# Patient Record
Sex: MALE | Race: WHITE | Employment: OTHER | URBAN - METROPOLITAN AREA
[De-identification: names, ages, dates, MRNs, and addresses within clinical notes are randomized per-mention and may not be internally consistent; named-entity substitution may affect disease eponyms.]

---

## 2017-01-17 RX ORDER — METOPROLOL SUCCINATE 50 MG/1
TABLET, EXTENDED RELEASE ORAL
Qty: 90 TAB | Refills: 2 | Status: SHIPPED | OUTPATIENT
Start: 2017-01-17 | End: 2017-10-10 | Stop reason: SDUPTHER

## 2017-01-18 ENCOUNTER — OFFICE VISIT (OUTPATIENT)
Dept: CARDIOLOGY CLINIC | Age: 67
End: 2017-01-18

## 2017-01-18 VITALS
SYSTOLIC BLOOD PRESSURE: 120 MMHG | DIASTOLIC BLOOD PRESSURE: 70 MMHG | BODY MASS INDEX: 32.78 KG/M2 | OXYGEN SATURATION: 97 % | HEART RATE: 60 BPM | WEIGHT: 229 LBS | HEIGHT: 70 IN

## 2017-01-18 DIAGNOSIS — Z98.890 S/P ABLATION OF ATRIAL FIBRILLATION: Primary | ICD-10-CM

## 2017-01-18 DIAGNOSIS — Z86.79 S/P ABLATION OF ATRIAL FIBRILLATION: Primary | ICD-10-CM

## 2017-01-18 DIAGNOSIS — I10 ESSENTIAL HYPERTENSION: ICD-10-CM

## 2017-01-18 DIAGNOSIS — E78.00 HYPERCHOLESTEREMIA: ICD-10-CM

## 2017-01-18 RX ORDER — AMIODARONE HYDROCHLORIDE 200 MG/1
200 TABLET ORAL DAILY
COMMUNITY
End: 2017-08-02

## 2017-01-18 NOTE — MR AVS SNAPSHOT
Visit Information Date & Time Provider Department Dept. Phone Encounter #  
 1/18/2017  9:20 AM Alessandra Garay DO [facility] [id number] Follow-up Instructions Return in about 6 months (around 7/18/2017), or if symptoms worsen or fail to improve. Upcoming Health Maintenance Date Due Hepatitis C Screening 1950 DTaP/Tdap/Td series (1 - Tdap) 10/27/1970 ZOSTER VACCINE AGE 60> 10/27/2009 GLAUCOMA SCREENING Q2Y 10/27/2014 Pneumococcal 65+ Low/Medium Risk (1 of 2 - PCV13) 10/27/2014 MEDICARE YEARLY EXAM 10/27/2014 INFLUENZA AGE 9 TO ADULT 8/1/2016 COLONOSCOPY 7/1/2019 Allergies as of 1/18/2017  Review Complete On: 1/18/2017 By: Alessandra Garay DO Severity Noted Reaction Type Reactions Lipitor [Atorvastatin]  02/05/2016    Myalgia Current Immunizations  Never Reviewed No immunizations on file. Not reviewed this visit You Were Diagnosed With   
  
 Codes Comments S/P ablation of atrial fibrillation    -  Primary ICD-10-CM: Z98.890, Z86.79 
ICD-9-CM: V45.89 Essential hypertension     ICD-10-CM: I10 
ICD-9-CM: 401.9 Hypercholesteremia     ICD-10-CM: E78.00 ICD-9-CM: 272.0 Vitals BP Pulse Height(growth percentile) Weight(growth percentile) SpO2 BMI  
 120/70 60 5' 10\" (1.778 m) 229 lb (103.9 kg) 97% 32.86 kg/m2 Smoking Status Never Smoker Vitals History BMI and BSA Data Body Mass Index Body Surface Area  
 32.86 kg/m 2 2.27 m 2 Preferred Pharmacy Pharmacy Name Phone [facility] [address], [address] [phone] Your Updated Medication List  
  
   
This list is accurate as of: 1/18/17 10:09 AM.  Always use your most recent med list.  
  
  
  
  
 amiodarone 200 mg tablet Commonly known as:  CORDARONE Take 200 mg by mouth daily. apixaban 5 mg tablet Commonly known as:  ELIQUIS  
TAKE ONE TABLET BY MOUTH TWICE A DAY  
  
 CRESTOR 20 mg tablet Generic drug:  rosuvastatin TAKE ONE TABLET BY MOUTH EVERY NIGHT AT BEDTIME  
  
 digoxin 0.25 mg tablet Commonly known as:  LANOXIN Take 1 Tab by mouth daily. metoprolol succinate 50 mg XL tablet Commonly known as:  TOPROL-XL  
TAKE ONE TABLET BY MOUTH DAILY We Performed the Following AMB POC EKG ROUTINE W/ 12 LEADS, INTER & REP [72620 CPT(R)] Follow-up Instructions Return in about 6 months (around 7/18/2017), or if symptoms worsen or fail to improve. To-Do List   
 01/18/2017 Lab:  DIGOXIN   
  
 03/15/2017 Lab:  HEPATIC FUNCTION PANEL   
  
 03/15/2017 Lab:  LIPID PANEL Introducing [facility]! Ana Benavidez introduces [employer] patient portal. Now you can access parts of your medical record, email your doctor's office, and request medication refills online. 1. In your internet browser, go to [email or web address] 2. Click on the First Time User? Click Here link in the Sign In box. You will see the New Member Sign Up page. 3. Enter your [employer] Access Code exactly as it appears below. You will not need to use this code after youve completed the sign-up process. If you do not sign up before the expiration date, you must request a new code. · Mark[employer] Access Code: [email or web address] Expires: 3/29/2017  5:31 PM 
 
4. Enter the last four digits of your Social Security Number (xxxx) and Date of Birth (mm/dd/yyyy) as indicated and click Submit. You will be taken to the next sign-up page. 5. Create a HubChillat ID. This will be your Markit login ID and cannot be changed, so think of one that is secure and easy to remember. 6. Create a Markit password. You can change your password at any time. 7. Enter your Password Reset Question and Answer. This can be used at a later time if you forget your password. 8. Enter your e-mail address. You will receive e-mail notification when new information is available in [address] 9. Click Sign Up. You can now view and download portions of your medical record. 10. Click the Download Summary menu link to download a portable copy of your medical information. If you have questions, please visit the Frequently Asked Questions section of the [employer] website. Remember, [employer] is NOT to be used for urgent needs. For medical emergencies, dial 911. Now available from your iPhone and Android! Please provide this summary of care documentation to your next provider. Your primary care clinician is listed as [phone]. If you have any questions after today's visit, please call [phone].

## 2017-01-18 NOTE — PROGRESS NOTES
HPI: I saw Kamini Chavezantonio Whitman in my office today in cardiovascular evaluation regarding his paroxysmal atrial fibrillation. Mr. Gallo Whitman is a pleasant 78year old white male with history of hypertension, hypercholesterolemia and a family history of atrial fibrillation with an older brother having atrial fibrillation and apparently an atrial fibrillation ablation in the past as well. He also gives history of his mother having atrial fibrillation.      He himself did not have any significant problems until 12/19/15, when he had some palpitations and was found to be in atrial flutter with 2:1 block and heart rate of 135-140. He was given IV Lopressor and ultimately converted back to sinus rhythm. I saw him on 12/22/15 and he had been on Toprol and Lisinopril and was having some dizziness, so Lisinopril was discontinued and I just kept him on Toprol, and he seemed to be doing very well when I saw him in the office on 2/5/16 in sinus rhythm with a heart rate of 66 at that time. We did do some testing on him back in January of 2016 with a nuclear myocardial perfusion study, which was completely normal, and also an echocardiogram in January of 2016, which demonstrated normal left ventricular size and function with ejection fraction of 50%, although there was moderate left ventricular hypertrophy and grade 2 diastolic dysfunction. There was normal left atrial size, although the right atrium was mildly enlarged. It should also be noted that he had a chest xray, which demonstrated a possible lung nodule and a CT scan was done on 1/5/16, which showed a 4 mm solid nodule in the right lower lobe, for which another CT is being planned to be completed in January of 2017 in follow up.     He came in to see me back on September 13, 2016 with complaints of fatigue over the prior month or 2 with finding of atrial fibrillation with rapid ventricular response at that time with a rate of about 160.  I sent him to the emergency room and we began digitalization. He subsequently was referred to Dr. Karen Kyle and ultimately admitted to [facility] where he had atrial fibrillation pulmonary vein isolation ablation using [employer]. A Trans-esophageal echocardiogram was done prior to ablation without left atrial appendage thrombus and this was all completed on November 8, 2016. He was started on amiodarone 200 mg twice a day for a week and then 200 mg daily was planned for the next 3 months which would be ending in early February 2017. He comes in today and relates that he is doing very well. He is not having any palpitations or any other cardiovascular complaints on his current medications. Encounter Diagnoses   Name Primary?  S/P ablation of atrial fibrillation Yes    Essential hypertension     Hypercholesteremia        Discussion: This gentleman appears to be doing about as well as we could expect and really have no recommendations for change at this time. He is remaining in sinus rhythm and is not complaining of any palpitations. We will plan to continue him on amiodarone through mid February and in view of the fact that he is also on digoxin I am going to get a digoxin dosage now to be sure he is not showing any signs of toxicity in view of the fact that amiodarone does tend to increase digoxin levels. We did discuss whether or not to continue on Eliquis long-term and he is having some issues with regard to cost in that regard so he is going to check with his insurance company and then let us know if we need to switch him to Xarelto or another agent for cost purposes since his co-pay on Eliquis apparently went up this month. We discussed whether to stay on anticoagulation empirically or not and he right now thinks he would rather stay on anticoagulation for 6 months and then get a Holter monitor study before we discontinue the anticoagulation which I think is a reasonable option.     His latest lipid profile which was completed in September 2016 demonstrated total cholesterol of 193 with triglycerides of 266, HDL 42, LDL of 97.8, and VLDL of 53.2 which is really suboptimal control on Crestor 20 mg daily and I am going to repeat his lipid profile in March 2017 and will make further recommendations at that time. His blood pressure appears to be well-controlled today EKG is stable in sinus rhythm so I will plan to see him again in several months or as needed if any new cardiovascular symptoms surface in the interim. PCP: Fay Ibrahim MD       Past Medical History   Diagnosis Date    Cardiac echocardiogram 01/08/2016     EF 60%. No WMA. Mod LVH. Gr 2 DDfx. Mild WALE.  Cardiac Holter monitor 02/12/2016     Mildly abnormal 48-hr Holter. Normal sinus rhythm w/a few short bursts of atrial fibrillation from 20-40 beats up to 170 bpm.  No symptoms reported.  Cardiac nuclear imaging study, normal 01/08/2016     Low risk. No ischemia or prior infarction. No RWMA. EF 63%. Neg EKG on pharm stress test.    Cardiac transesophageal echocardiography ([facility]) 11/08/2016     EF 55%. No WMA. LVH. Mild LAE. No LA appendage thrombus. Mod WALE.  Hypercholesterolemia     Hypertension     Typical atrial flutter ([address] 12/19/2015     atrial flutter with 2 to1 block at a rate of 135 on EKG       Past Surgical History   Procedure Laterality Date    Hx hemorrhoidectomy      Hx cataract removal      Hx hemorrhoidectomy         Current Outpatient Rx   Name  Route  Sig  Dispense  Refill    metoprolol succinate (TOPROL XL) 50 mg XL tablet    Oral    Take 1 Tab by mouth daily for 30 days. 90 Tab    3      apixaban (ELIQUIS) 5 mg tablet    Oral    Take 1 Tab by mouth two (2) times a day. 60 Tab    6      pravastatin (PRAVACHOL) 80 mg tablet    Oral    Take 80 mg by mouth daily.                  Allergies   Allergen Reactions    Lipitor [Atorvastatin] Myalgia       Social History :  Social History   Substance Use Topics    Smoking status: Never Smoker    Smokeless tobacco: Never Used    Alcohol use Yes        Family History: family history includes Cancer in his mother; Colon Cancer in his mother; Colon Polyps in his brother and sister. Review of Systems:   Constitutional: Negative. Respiratory: Positive for cough and sputum production. Negative for hemoptysis, shortness of breath and wheezing. Cardiovascular: Negative. Gastrointestinal: Negative. Musculoskeletal: Positive for joint pain. Negative for back pain, falls, myalgias and neck pain. Physical Exam:    The patient is a cooperative, alert, well developed, well nourished 78 y.o.   male who is in no acute distress at the time of the examination. Visit Vitals    /70    Pulse 60    Ht 5' 10\" (1.778 m)    Wt 103.9 kg (229 lb)    SpO2 97%    BMI 32.86 kg/m2       HEENT: Conjuctiva white, mucosa moist, no pallor or cyanosis. NECK: Supple without masses, tenderness or thyromegaly. There was no jugular venous distention. Carotid are full bilaterally without bruits. CHEST: Symmetrical with good excursion. LUNGS: Clear to auscultation in all fields. HEART: Irregularly irregular rhythm with a rapid ventricular response. The apex is not displaced. There were no lifts, thrills or heaves. There is a normal S1 and variable S2 without appreciable murmurs, rubs, clicks, or gallops auscultated. ABDOMEN: Soft without masses, tenderness or organomegaly. EXTREMITIES: Full peripheral pulses with trace peripheral edema. Review of Data: See PMH and Cardiology and Imaging sections for cardiac testing      Results for orders placed or performed in visit on 01/18/17   AMB POC EKG ROUTINE W/ 12 LEADS, INTER & REP     Status: None    Narrative    Normal sinus rhythm, rate 60. There is some sinus arrhythmia. Early R-wave transition anteriorly. This EKG is similar to the EKG of December 5, 2016.          Armando Abreu Mayur Sandhu.[email or web address]. [facility]  [facility]  [address]. [address] [address]    PLEASE NOTE:  This document has been produced using voice recognition software. Unrecognized errors in transcription may be present.

## 2017-01-18 NOTE — PROGRESS NOTES
Review of Systems   Constitutional: Negative. Respiratory: Positive for cough and sputum production. Negative for hemoptysis, shortness of breath and wheezing. Cardiovascular: Negative. Gastrointestinal: Negative. Musculoskeletal: Positive for joint pain. Negative for back pain, falls, myalgias and neck pain.

## 2017-01-18 NOTE — PROGRESS NOTES
1. Have you been to the ER, urgent care clinic since your last visit? Hospitalized since your last visit? Yes, 11/8/16 - 11/9/16 for afib ablation     2. Have you seen or consulted any other health care providers outside of the [address] [place] [address] since your last visit? Include any pap smears or colon screening.   No

## 2017-01-20 ENCOUNTER — HOSPITAL ENCOUNTER (OUTPATIENT)
Dept: LAB | Age: 67
Discharge: HOME | End: 2017-01-20
Payer: MEDICARE

## 2017-01-20 DIAGNOSIS — Z98.890 S/P ABLATION OF ATRIAL FIBRILLATION: ICD-10-CM

## 2017-01-20 DIAGNOSIS — Z86.79 S/P ABLATION OF ATRIAL FIBRILLATION: ICD-10-CM

## 2017-01-20 LAB — DIGOXIN SERPL-MCNC: 1.1 NG/ML (ref 0.9–2)

## 2017-01-20 PROCEDURE — 36415 COLL VENOUS BLD VENIPUNCTURE: CPT | Performed by: INTERNAL MEDICINE

## 2017-01-20 PROCEDURE — 80162 ASSAY OF DIGOXIN TOTAL: CPT | Performed by: INTERNAL MEDICINE

## 2017-01-24 ENCOUNTER — TELEPHONE (OUTPATIENT)
Dept: CARDIOLOGY CLINIC | Age: 67
End: 2017-01-24

## 2017-01-24 DIAGNOSIS — Z86.79 S/P ABLATION OF ATRIAL FIBRILLATION: Primary | ICD-10-CM

## 2017-01-24 DIAGNOSIS — Z98.890 S/P ABLATION OF ATRIAL FIBRILLATION: Primary | ICD-10-CM

## 2017-01-24 DIAGNOSIS — I48.91 ATRIAL FIBRILLATION, UNSPECIFIED TYPE (HCC): ICD-10-CM

## 2017-01-24 NOTE — TELEPHONE ENCOUNTER
----- Message from Malcolm Benavides DO sent at 1/20/2017  6:51 PM EST -----  This man's digoxin level looks fine. Please let him know.  ES

## 2017-03-01 RX ORDER — ROSUVASTATIN CALCIUM 20 MG/1
TABLET, COATED ORAL
Qty: 30 TAB | Refills: 4 | Status: SHIPPED | OUTPATIENT
Start: 2017-03-01 | End: 2017-07-31 | Stop reason: SDUPTHER

## 2017-03-07 ENCOUNTER — HOSPITAL ENCOUNTER (OUTPATIENT)
Dept: LAB | Age: 67
Discharge: HOME | End: 2017-03-07
Payer: MEDICARE

## 2017-03-07 LAB
ALBUMIN SERPL BCP-MCNC: 3.5 G/DL (ref 3.4–5)
ALBUMIN/GLOB SERPL: 0.9 {RATIO} (ref 0.8–1.7)
ALP SERPL-CCNC: 98 U/L (ref 45–117)
ALT SERPL-CCNC: 29 U/L (ref 16–61)
AST SERPL W P-5'-P-CCNC: 17 U/L (ref 15–37)
BILIRUB DIRECT SERPL-MCNC: <0.1 MG/DL (ref 0–0.2)
BILIRUB SERPL-MCNC: 0.3 MG/DL (ref 0.2–1)
CHOLEST SERPL-MCNC: 146 MG/DL
GLOBULIN SER CALC-MCNC: 3.7 G/DL (ref 2–4)
HDLC SERPL-MCNC: 41 MG/DL (ref 40–60)
HDLC SERPL: 3.6 {RATIO} (ref 0–5)
LDLC SERPL CALC-MCNC: 75.2 MG/DL (ref 0–100)
LIPID PROFILE,FLP: NORMAL
PROT SERPL-MCNC: 7.2 G/DL (ref 6.4–8.2)
TRIGL SERPL-MCNC: 149 MG/DL (ref ?–150)
VLDLC SERPL CALC-MCNC: 29.8 MG/DL

## 2017-03-07 PROCEDURE — 80076 HEPATIC FUNCTION PANEL: CPT | Performed by: INTERNAL MEDICINE

## 2017-03-07 PROCEDURE — 80061 LIPID PANEL: CPT | Performed by: INTERNAL MEDICINE

## 2017-03-07 PROCEDURE — 36415 COLL VENOUS BLD VENIPUNCTURE: CPT | Performed by: INTERNAL MEDICINE

## 2017-03-16 ENCOUNTER — TELEPHONE (OUTPATIENT)
Dept: CARDIOLOGY CLINIC | Age: 67
End: 2017-03-16

## 2017-03-16 DIAGNOSIS — E78.00 HYPERCHOLESTEREMIA: Primary | ICD-10-CM

## 2017-03-30 ENCOUNTER — TELEPHONE (OUTPATIENT)
Dept: CARDIOLOGY CLINIC | Age: 67
End: 2017-03-30

## 2017-03-30 NOTE — LETTER
4/3/2017 5:16 PM 
 
Mr. Parris Jones [address] [address]  
 1950 Parris Jones can hold beta blocker for 24 hours prior to allergy testing. Please contact our office with further questions or concerns.  
 
 
 
Sincerely, 
 
 
 
 
Andreas Irvin, DO

## 2017-03-30 NOTE — TELEPHONE ENCOUNTER
Pt calling to see if it is ok to hold the beta blocker prior to allergy testing     I will forward this to Dr Vee Kaiser

## 2017-04-03 ENCOUNTER — HOSPITAL ENCOUNTER (OUTPATIENT)
Dept: LAB | Age: 67
Discharge: HOME | End: 2017-04-03
Payer: MEDICARE

## 2017-04-03 LAB — DIGOXIN SERPL-MCNC: 0.8 NG/ML (ref 0.9–2)

## 2017-04-03 PROCEDURE — 36415 COLL VENOUS BLD VENIPUNCTURE: CPT | Performed by: INTERNAL MEDICINE

## 2017-04-03 PROCEDURE — 80162 ASSAY OF DIGOXIN TOTAL: CPT | Performed by: INTERNAL MEDICINE

## 2017-04-03 NOTE — LETTER
4/20/2017 2:34 PM 
 
Mr. Silvia Manrique [address] [address] Dear Silvia Manrique: 
 
Please find your most recent results below. Resulted Orders DIGOXIN Result Value Ref Range DIGOXIN 0.8 (L) 0.9 - 2.0 NG/ML  
 
 
RECOMMENDATIONS: 
Continue with current  Medications. Recheck in 6 months. Please call me if you have any questions: [phone] Sincerely,

## 2017-04-03 NOTE — TELEPHONE ENCOUNTER
I discussed with Dr. Carly Botello. Verbal order and read back per Lilly Wiggins, DO patient can hold beta blocker prior to allergy testing. Patient states he is to hold for 24 hours. Letter faxed to Dr. Jeff Vargas at [facility].   Alexei Rivera LPN

## 2017-04-20 ENCOUNTER — TELEPHONE (OUTPATIENT)
Dept: CARDIOLOGY CLINIC | Age: 67
End: 2017-04-20

## 2017-04-20 DIAGNOSIS — I48.91 ATRIAL FIBRILLATION, UNSPECIFIED TYPE (HCC): ICD-10-CM

## 2017-04-20 DIAGNOSIS — Z86.79 S/P ABLATION OF ATRIAL FIBRILLATION: Primary | ICD-10-CM

## 2017-04-20 DIAGNOSIS — Z98.890 S/P ABLATION OF ATRIAL FIBRILLATION: Primary | ICD-10-CM

## 2017-04-20 NOTE — TELEPHONE ENCOUNTER
----- Message from Sussy Langston DO sent at 4/3/2017  7:50 PM EDT -----  This man's digoxin level looks fine. Please let him know.  ES

## 2017-06-12 ENCOUNTER — CLINICAL SUPPORT (OUTPATIENT)
Dept: CARDIOLOGY CLINIC | Age: 67
End: 2017-06-12

## 2017-06-12 ENCOUNTER — HOSPITAL ENCOUNTER (OUTPATIENT)
Dept: LAB | Age: 67
Discharge: HOME | End: 2017-06-12
Payer: MEDICARE

## 2017-06-12 ENCOUNTER — TELEPHONE (OUTPATIENT)
Dept: CARDIOLOGY CLINIC | Age: 67
End: 2017-06-12

## 2017-06-12 VITALS
WEIGHT: 229 LBS | OXYGEN SATURATION: 96 % | BODY MASS INDEX: 32.86 KG/M2 | SYSTOLIC BLOOD PRESSURE: 140 MMHG | HEART RATE: 119 BPM | DIASTOLIC BLOOD PRESSURE: 80 MMHG

## 2017-06-12 DIAGNOSIS — R50.9 FEVER AND CHILLS: ICD-10-CM

## 2017-06-12 DIAGNOSIS — Z86.79 S/P ABLATION OF ATRIAL FIBRILLATION: ICD-10-CM

## 2017-06-12 DIAGNOSIS — I15.9 SECONDARY HYPERTENSION: Primary | ICD-10-CM

## 2017-06-12 DIAGNOSIS — Z98.890 S/P ABLATION OF ATRIAL FIBRILLATION: ICD-10-CM

## 2017-06-12 LAB
ANION GAP BLD CALC-SCNC: 9 MMOL/L (ref 3–18)
BUN SERPL-MCNC: 14 MG/DL (ref 7–18)
BUN/CREAT SERPL: 13 (ref 12–20)
CALCIUM SERPL-MCNC: 9.2 MG/DL (ref 8.5–10.1)
CHLORIDE SERPL-SCNC: 104 MMOL/L (ref 100–108)
CO2 SERPL-SCNC: 26 MMOL/L (ref 21–32)
CREAT SERPL-MCNC: 1.05 MG/DL (ref 0.6–1.3)
GLUCOSE SERPL-MCNC: 102 MG/DL (ref 74–99)
POTASSIUM SERPL-SCNC: 4.3 MMOL/L (ref 3.5–5.5)
SODIUM SERPL-SCNC: 139 MMOL/L (ref 136–145)

## 2017-06-12 PROCEDURE — 36415 COLL VENOUS BLD VENIPUNCTURE: CPT | Performed by: INTERNAL MEDICINE

## 2017-06-12 PROCEDURE — 80048 BASIC METABOLIC PNL TOTAL CA: CPT | Performed by: INTERNAL MEDICINE

## 2017-06-12 NOTE — TELEPHONE ENCOUNTER
Pt called with c/o increase heart rate for the past 12hrs. Pt stated that he went to bed last night with an increase heart rate but he didn't check to see what it was. Pt stated that he checked this morning and it's been ranging from the upper 90 to 108. He has c/o headache and being lethargic. Verbal order and read back per Natividad Hernandez, DO  Schedule pt for an rhythm strip.     Pt is schedule today for 2pm.

## 2017-06-12 NOTE — PROGRESS NOTES
Selina Culp is a 78 y.o. male that is here for a blood pressure check and heart rate check after calling with concerns he was back in afib and not feeling well. His current medications are listed below. Current Outpatient Prescriptions   Medication Sig    rosuvastatin (CRESTOR) 20 mg tablet TAKE ONE TABLET BY MOUTH EVERY NIGHT AT BEDTIME    amiodarone (CORDARONE) 200 mg tablet Take 200 mg by mouth daily.  metoprolol succinate (TOPROL-XL) 50 mg XL tablet TAKE ONE TABLET BY MOUTH DAILY    apixaban (ELIQUIS) 5 mg tablet TAKE ONE TABLET BY MOUTH TWICE A DAY    digoxin (LANOXIN) 0.25 mg tablet Take 1 Tab by mouth daily. No current facility-administered medications for this visit. His   Visit Vitals    /80    Pulse (!) 119    Wt 103.9 kg (229 lb)    SpO2 96%    BMI 32.86 kg/m2       Pt states he woke up this morning not feeling well. Heart rate was running around 70 with is normal. He went out to water the grass this morning heart rate went up to 97 and then increased to 120. Pt was concerned he went back into afib. Pt states he has some sinus issues and has allergy shots scheduled for tomorrow. Pt states he is having some nausea but no vomiting. He was able to eat a banana and drink water this morning. Pt states he took his medications also this morning. EKG done per Dr Meryl Ernandez. Pt temperature was taken at the office. Oral thermometer states 101.  F     Verbal order and read back per Dr Stefan laughlin CBC and BMP   Stay hydrated and follow up with PCP

## 2017-06-13 ENCOUNTER — TELEPHONE (OUTPATIENT)
Dept: CARDIOLOGY CLINIC | Age: 67
End: 2017-06-13

## 2017-06-13 NOTE — PROGRESS NOTES
His BMP is normal, but where is his EKG. He was having a tachycardia and I was worried about an infection. No CBC has resulted yet. Please call the lab and get it.  ES

## 2017-06-13 NOTE — TELEPHONE ENCOUNTER
I called to check on the patient due to his tachycardia and to give him the results of his BMP, although for some reason the CBC has not resulted yet. In any event he went to his PCP and is now on Penicillin for strep throat.  ES

## 2017-06-13 NOTE — TELEPHONE ENCOUNTER
----- Message from Kristi Hu sent at 6/13/2017  8:29 AM EDT -----  Per Telephone Encounter on 6/12/2017

## 2017-06-20 NOTE — TELEPHONE ENCOUNTER
----- Message from Cherilyn Angelucci, DO sent at 6/13/2017  5:14 PM EDT -----  His BMP is normal, but where is his EKG. He was having a tachycardia and I was worried about an infection. No CBC has resulted yet. Please call the lab and get it.  ES

## 2017-06-20 NOTE — TELEPHONE ENCOUNTER
Called the lab, spoke with Christina Salcedo and she stated that the blood was drawn but she doesn't know why it wasn't resulted. EKG was done and was review and sent to be scanned.

## 2017-07-12 RX ORDER — APIXABAN 5 MG/1
TABLET, FILM COATED ORAL
Qty: 60 TAB | Refills: 4 | Status: SHIPPED | OUTPATIENT
Start: 2017-07-12 | End: 2017-12-03 | Stop reason: SDUPTHER

## 2017-07-19 ENCOUNTER — HOSPITAL ENCOUNTER (OUTPATIENT)
Dept: LAB | Age: 67
Discharge: HOME | End: 2017-07-19
Payer: MEDICARE

## 2017-07-19 DIAGNOSIS — Z98.890 S/P ABLATION OF ATRIAL FIBRILLATION: ICD-10-CM

## 2017-07-19 DIAGNOSIS — I48.91 ATRIAL FIBRILLATION, UNSPECIFIED TYPE (HCC): ICD-10-CM

## 2017-07-19 DIAGNOSIS — Z86.79 S/P ABLATION OF ATRIAL FIBRILLATION: ICD-10-CM

## 2017-07-19 LAB — DIGOXIN SERPL-MCNC: 0.7 NG/ML (ref 0.9–2)

## 2017-07-19 PROCEDURE — 36415 COLL VENOUS BLD VENIPUNCTURE: CPT | Performed by: INTERNAL MEDICINE

## 2017-07-19 PROCEDURE — 80162 ASSAY OF DIGOXIN TOTAL: CPT | Performed by: INTERNAL MEDICINE

## 2017-07-21 NOTE — PROGRESS NOTES
Per your last note : We will plan to continue him on amiodarone through mid February and in view of the fact that he is also on digoxin I am going to get a digoxin dosage now to be sure he is not showing any signs of toxicity in view of the fact that amiodarone does tend to increase digoxin levels.

## 2017-07-31 RX ORDER — ROSUVASTATIN CALCIUM 20 MG/1
TABLET, COATED ORAL
Qty: 30 TAB | Refills: 6 | Status: SHIPPED | OUTPATIENT
Start: 2017-07-31 | End: 2017-08-08 | Stop reason: SDUPTHER

## 2017-08-02 ENCOUNTER — OFFICE VISIT (OUTPATIENT)
Dept: CARDIOLOGY CLINIC | Age: 67
End: 2017-08-02

## 2017-08-02 VITALS
OXYGEN SATURATION: 97 % | HEIGHT: 70 IN | SYSTOLIC BLOOD PRESSURE: 120 MMHG | WEIGHT: 237 LBS | BODY MASS INDEX: 33.93 KG/M2 | DIASTOLIC BLOOD PRESSURE: 86 MMHG | HEART RATE: 67 BPM

## 2017-08-02 DIAGNOSIS — I48.0 PAF (PAROXYSMAL ATRIAL FIBRILLATION) (HCC): ICD-10-CM

## 2017-08-02 DIAGNOSIS — I15.9 SECONDARY HYPERTENSION: ICD-10-CM

## 2017-08-02 DIAGNOSIS — E78.00 HYPERCHOLESTEREMIA: ICD-10-CM

## 2017-08-02 DIAGNOSIS — Z98.890 S/P ABLATION OF ATRIAL FIBRILLATION: Primary | ICD-10-CM

## 2017-08-02 DIAGNOSIS — Z86.79 S/P ABLATION OF ATRIAL FIBRILLATION: Primary | ICD-10-CM

## 2017-08-02 NOTE — PROGRESS NOTES
1. Have you been to the ER, urgent care clinic since your last visit? Hospitalized since your last visit? No     2. Have you seen or consulted any other health care providers outside of the [address] [place] [address] since your last visit? Include any pap smears or colon screening.  No

## 2017-08-02 NOTE — PROGRESS NOTES
Review of Systems   Constitutional: Negative. Respiratory: Positive for cough and shortness of breath. Negative for hemoptysis, sputum production and wheezing. Cardiovascular: Negative. Gastrointestinal: Negative. Musculoskeletal: Positive for joint pain. Negative for back pain, falls, myalgias and neck pain.

## 2017-08-02 NOTE — PROGRESS NOTES
HPI:  I saw Basilia QuigleyTrish Martinez in my office today in cardiovascular evaluation regarding his paroxysmal atrial fibrillation. Mr. Lauren Martinez is a pleasant 78year old white male with history of hypertension, hypercholesterolemia and a family history of atrial fibrillation with an older brother having atrial fibrillation and apparently an atrial fibrillation ablation in the past as well. He also gives history of his mother having atrial fibrillation. He himself did not have any significant problems until 12/19/15, when he had some palpitations and was found to be in atrial flutter with 2:1 block and heart rate of 135-140. He was given IV Lopressor and ultimately converted back to sinus rhythm. He continued to have recurrent problems with paroxysms of atrial fibrillation and ultimately was referred to Dr. Prosper Wheeler and ultimately admitted to [facility] where he had atrial fibrillation pulmonary vein isolation ablation using [employer]. A Trans-esophageal echocardiogram was done prior to ablation without left atrial appendage thrombus and this was all completed on November 8, 2016. He was started on amiodarone 200 mg twice a day for a week and then 200 mg daily was planned for the next 3 months ending in early February 2017. He comes in today and relates that he is doing very well. He is remaining quite active and denies any palpitations or any other cardiovascular symptomatology except for mild shortness of breath with higher levels of activity.      Encounter Diagnoses   Name Primary?  PAF (paroxysmal atrial fibrillation) ([facility])     S/P ablation of atrial fibrillation Yes    Secondary hypertension     Hypercholesteremia        Discussion: This patient appears to be doing about as well as we could expect and he does not appear to be having any palpitations.   We did have a long discussion with regard to which medications to keep him on for the long-term and since he has normal left ventricular function and has had no palpitation issues I think it is reasonable to discontinue digoxin at this time. We also discussed long-term anticoagulation and I told him that if he wanted to come off of Eliquis that I think we should do a 24-hour Holter monitor study to be sure he is not having any breakthrough atrial fibrillation and if he is not it would be reasonable to stop Eliquis. However, we discussed the fact that most patients who do have paroxysmal defibrillation do not know when they go back in atrial fibrillation and the potential for stroke is always going to be their moving forward, so just staying on Eliquis empirically to guard against the potential for recurrent atrial fibrillation is also reasonable and he  decided he would like to do that. His latest lipid profile which was completed on March 7, 2017 was reasonably good with total cholesterol 146, triglycerides 149, HDL 41, LDL of 75.2, and VLDL of 29.8 I think this is reasonable control on his Crestor 20 mg daily. We also did get a recent digoxin level which was in the low normal therapeutic range at 0.7 for a trough level but we have decided to stop that medication at this time. Since he is otherwise doing well I will see him in several months. PCP: Marlen Garvin MD       Past Medical History:   Diagnosis Date    Cardiac echocardiogram 01/08/2016    EF 60%. No WMA. Mod LVH. Gr 2 DDfx. Mild WALE.  Cardiac Holter monitor 02/12/2016    Mildly abnormal 48-hr Holter. Normal sinus rhythm w/a few short bursts of atrial fibrillation from 20-40 beats up to 170 bpm.  No symptoms reported.  Cardiac nuclear imaging study, normal 01/08/2016    Low risk. No ischemia or prior infarction. No RWMA. EF 63%. Neg EKG on pharm stress test.    Cardiac transesophageal echocardiography (SONDRA) 11/08/2016    EF 55%. No WMA. LVH. Mild LAE. No LA appendage thrombus. Mod WALE.       Hypercholesterolemia     Hypertension     Typical atrial flutter ([facility] [address] [facility] 12/19/2015    atrial flutter with 2 to1 block at a rate of 135 on EKG       Past Surgical History:   Procedure Laterality Date    HX CATARACT REMOVAL      HX HEMORRHOIDECTOMY      HX HEMORRHOIDECTOMY         Current Outpatient Rx   Name  Route  Sig  Dispense  Refill    metoprolol succinate (TOPROL XL) 50 mg XL tablet    Oral    Take 1 Tab by mouth daily for 30 days. 90 Tab    3      apixaban (ELIQUIS) 5 mg tablet    Oral    Take 1 Tab by mouth two (2) times a day. 60 Tab    6      pravastatin (PRAVACHOL) 80 mg tablet    Oral    Take 80 mg by mouth daily. Allergies   Allergen Reactions    Lipitor [Atorvastatin] Myalgia       Social History :  Social History   Substance Use Topics    Smoking status: Never Smoker    Smokeless tobacco: Never Used    Alcohol use Yes        Family History: family history includes Cancer in his mother; Colon Cancer in his mother; Colon Polyps in his brother and sister. Review of Systems:   Constitutional: Negative. Respiratory: Positive for cough and shortness of breath. Negative for hemoptysis, sputum production and wheezing. Cardiovascular: Negative. Gastrointestinal: Negative. Musculoskeletal: Positive for joint pain. Negative for back pain, falls, myalgias and neck pain. Physical Exam:    The patient is a cooperative, alert, well developed, well nourished 78 y.o.   male who is in no acute distress at the time of the examination. Visit Vitals    /86    Pulse 67    Ht 5' 10\" (1.778 m)    Wt 107.5 kg (237 lb)    SpO2 97%    BMI 34.01 kg/m2       HEENT: Conjuctiva white, mucosa moist, no pallor or cyanosis. NECK: Supple without masses, tenderness or thyromegaly. There was no jugular venous distention. Carotid are full bilaterally without bruits. CHEST: Symmetrical with good excursion. LUNGS: Clear to auscultation in all fields.   HEART: Irregularly irregular rhythm with a rapid ventricular response. The apex is not displaced. There were no lifts, thrills or heaves. There is a normal S1 and variable S2 without appreciable murmurs, rubs, clicks, or gallops auscultated. ABDOMEN: Soft without masses, tenderness or organomegaly. EXTREMITIES: Full peripheral pulses with trace peripheral edema. Review of Data: See PMH and Cardiology and Imaging sections for cardiac testing      Results for orders placed or performed in visit on 08/02/17   AMB POC EKG ROUTINE W/ 12 LEADS, INTER & REP     Status: None    Narrative    Normal sinus rhythm with rate 67 and some sinus arrhythmia. This EKG is within normal limits and similar to the EKG of January 18, 2017. Larissa Lees D.O., F.A.C.C. Cardiovascular Specialists  [facility] [place]  [address] [place] [address]. [address] [place] [address]    PLEASE NOTE:  This document has been produced using voice recognition software. Unrecognized errors in transcription may be present.

## 2017-08-02 NOTE — MR AVS SNAPSHOT
Visit Information Date & Time Provider Department Dept. Phone Encounter #  
 8/2/2017  8:20 AM Rosa Bennett, DO [facility] [address] [id number] Follow-up Instructions Return in about 6 months (around 2/2/2018), or if symptoms worsen or fail to improve. Your Appointments 2/12/2018  8:00 AM  
Follow Up with Rosa Bennett, DO [facility] ([address]) Appt Note: Return in about 6 months [place] [id number]  
[phone] [address] Upcoming Health Maintenance Date Due Hepatitis C Screening 1950 DTaP/Tdap/Td series (1 - Tdap) 10/27/1970 ZOSTER VACCINE AGE 60> 8/27/2009 GLAUCOMA SCREENING Q2Y 10/27/2014 Pneumococcal 65+ Low/Medium Risk (1 of 2 - PCV13) 10/27/2014 MEDICARE YEARLY EXAM 10/27/2014 INFLUENZA AGE 9 TO ADULT 8/1/2017 COLONOSCOPY 7/1/2019 Allergies as of 8/2/2017  Review Complete On: 8/2/2017 By: Rosa Bennett, DO Severity Noted Reaction Type Reactions Lipitor [Atorvastatin]  02/05/2016    Myalgia Current Immunizations  Never Reviewed No immunizations on file. Not reviewed this visit You Were Diagnosed With   
  
 Codes Comments S/P ablation of atrial fibrillation    -  Primary ICD-10-CM: Z98.890, Z86.79 
ICD-9-CM: V45.89 PAF (paroxysmal atrial fibrillation) (HCC)     ICD-10-CM: I48.0 ICD-9-CM: 427.31 Secondary hypertension     ICD-10-CM: I15.9 ICD-9-CM: 405.99 Hypercholesteremia     ICD-10-CM: E78.00 ICD-9-CM: 272.0 Vitals BP Pulse Height(growth percentile) Weight(growth percentile) SpO2 BMI  
 120/86 67 5' 10\" (1.778 m) 237 lb (107.5 kg) 97% 34.01 kg/m2 Smoking Status Never Smoker Vitals History BMI and BSA Data Body Mass Index Body Surface Area  34.01 kg/m 2 2.3 m 2  
  
 Preferred Pharmacy Pharmacy Name Phone [facility] [address], [address] [phone] Your Updated Medication List  
  
   
This list is accurate as of: 8/2/17  9:46 AM.  Always use your most recent med list.  
  
  
  
  
 ELIQUIS 5 mg tablet Generic drug:  apixaban TAKE ONE TABLET BY MOUTH TWICE A DAY  
  
 metoprolol succinate 50 mg XL tablet Commonly known as:  TOPROL-XL  
TAKE ONE TABLET BY MOUTH DAILY  
  
 rosuvastatin 20 mg tablet Commonly known as:  CRESTOR  
TAKE ONE TABLET BY MOUTH EVERY NIGHT AT BEDTIME We Performed the Following AMB POC EKG ROUTINE W/ 12 LEADS, INTER & REP [05327 CPT(R)] Follow-up Instructions Return in about 6 months (around 2/2/2018), or if symptoms worsen or fail to improve. Introducing [address]! Mandy Díaz introduces Torrential patient portal. Now you can access parts of your medical record, email your doctor's office, and request medication refills online. 1. In your internet browser, go to [email or web address] 2. Click on the First Time User? Click Here link in the Sign In box. You will see the New Member Sign Up page. 3. Enter your Torrential Access Code exactly as it appears below. You will not need to use this code after youve completed the sign-up process. If you do not sign up before the expiration date, you must request a new code. · Torrential Access Code: [email or web address] Expires: 10/31/2017  8:26 AM 
 
4. Enter the last four digits of your Social Security Number (xxxx) and Date of Birth (mm/dd/yyyy) as indicated and click Submit. You will be taken to the next sign-up page. 5. Create a Torrential ID. This will be your Torrential login ID and cannot be changed, so think of one that is secure and easy to remember. 6. Create a Torrential password. You can change your password at any time. 7. Enter your Password Reset Question and Answer.  This can be used at a later time if you forget your password. 8. Enter your e-mail address. You will receive e-mail notification when new information is available in [address] 9. Click Sign Up. You can now view and download portions of your medical record. 10. Click the Download Summary menu link to download a portable copy of your medical information. If you have questions, please visit the Frequently Asked Questions section of the [employer] website. Remember, [employer] is NOT to be used for urgent needs. For medical emergencies, dial 911. Now available from your iPhone and Android! Please provide this summary of care documentation to your next provider. Your primary care clinician is listed as [phone]. If you have any questions after today's visit, please call [phone].

## 2017-08-09 RX ORDER — ROSUVASTATIN CALCIUM 20 MG/1
TABLET, COATED ORAL
Qty: 90 TAB | Refills: 3 | Status: SHIPPED | OUTPATIENT
Start: 2017-08-09 | End: 2018-10-02 | Stop reason: SDUPTHER

## 2017-08-23 ENCOUNTER — HOSPITAL ENCOUNTER (OUTPATIENT)
Dept: LAB | Age: 67
Discharge: HOME | End: 2017-08-23
Payer: MEDICARE

## 2017-08-23 DIAGNOSIS — E78.00 HYPERCHOLESTEREMIA: ICD-10-CM

## 2017-08-23 LAB
ALBUMIN SERPL-MCNC: 3.4 G/DL (ref 3.4–5)
ALBUMIN/GLOB SERPL: 0.9 {RATIO} (ref 0.8–1.7)
ALP SERPL-CCNC: 103 U/L (ref 45–117)
ALT SERPL-CCNC: 27 U/L (ref 16–61)
AST SERPL-CCNC: 16 U/L (ref 15–37)
BILIRUB DIRECT SERPL-MCNC: 0.1 MG/DL (ref 0–0.2)
BILIRUB SERPL-MCNC: 0.5 MG/DL (ref 0.2–1)
CHOLEST SERPL-MCNC: 136 MG/DL
GLOBULIN SER CALC-MCNC: 3.8 G/DL (ref 2–4)
HDLC SERPL-MCNC: 45 MG/DL (ref 40–60)
HDLC SERPL: 3 {RATIO} (ref 0–5)
LDLC SERPL CALC-MCNC: 64.8 MG/DL (ref 0–100)
LIPID PROFILE,FLP: NORMAL
PROT SERPL-MCNC: 7.2 G/DL (ref 6.4–8.2)
TRIGL SERPL-MCNC: 131 MG/DL (ref ?–150)
VLDLC SERPL CALC-MCNC: 26.2 MG/DL

## 2017-08-23 PROCEDURE — 36415 COLL VENOUS BLD VENIPUNCTURE: CPT | Performed by: INTERNAL MEDICINE

## 2017-08-23 PROCEDURE — 80061 LIPID PANEL: CPT | Performed by: INTERNAL MEDICINE

## 2017-08-23 PROCEDURE — 80076 HEPATIC FUNCTION PANEL: CPT | Performed by: INTERNAL MEDICINE

## 2017-09-05 ENCOUNTER — TELEPHONE (OUTPATIENT)
Dept: CARDIOLOGY CLINIC | Age: 67
End: 2017-09-05

## 2017-09-05 DIAGNOSIS — E78.00 HYPERCHOLESTEREMIA: Primary | ICD-10-CM

## 2017-10-10 RX ORDER — METOPROLOL SUCCINATE 50 MG/1
TABLET, EXTENDED RELEASE ORAL
Qty: 90 TAB | Refills: 3 | Status: SHIPPED | OUTPATIENT
Start: 2017-10-10 | End: 2018-08-31 | Stop reason: SDUPTHER

## 2017-12-04 RX ORDER — APIXABAN 5 MG/1
TABLET, FILM COATED ORAL
Qty: 180 TAB | Refills: 3 | Status: SHIPPED | OUTPATIENT
Start: 2017-12-04 | End: 2018-04-18

## 2018-02-19 ENCOUNTER — HOSPITAL ENCOUNTER (OUTPATIENT)
Dept: LAB | Age: 68
Discharge: HOME | End: 2018-02-19
Payer: MEDICARE

## 2018-02-19 DIAGNOSIS — E78.00 HYPERCHOLESTEREMIA: ICD-10-CM

## 2018-02-19 LAB
ALBUMIN SERPL-MCNC: 3.7 G/DL (ref 3.4–5)
ALBUMIN/GLOB SERPL: 1 {RATIO} (ref 0.8–1.7)
ALP SERPL-CCNC: 109 U/L (ref 45–117)
ALT SERPL-CCNC: 36 U/L (ref 16–61)
AST SERPL-CCNC: 19 U/L (ref 15–37)
BILIRUB DIRECT SERPL-MCNC: 0.1 MG/DL (ref 0–0.2)
BILIRUB SERPL-MCNC: 0.5 MG/DL (ref 0.2–1)
CHOLEST SERPL-MCNC: 153 MG/DL
GLOBULIN SER CALC-MCNC: 3.7 G/DL (ref 2–4)
HDLC SERPL-MCNC: 45 MG/DL (ref 40–60)
HDLC SERPL: 3.4 {RATIO} (ref 0–5)
LDLC SERPL CALC-MCNC: 73.2 MG/DL (ref 0–100)
LIPID PROFILE,FLP: ABNORMAL
PROT SERPL-MCNC: 7.4 G/DL (ref 6.4–8.2)
TRIGL SERPL-MCNC: 174 MG/DL (ref ?–150)
VLDLC SERPL CALC-MCNC: 34.8 MG/DL

## 2018-02-19 PROCEDURE — 36415 COLL VENOUS BLD VENIPUNCTURE: CPT | Performed by: INTERNAL MEDICINE

## 2018-02-19 PROCEDURE — 80076 HEPATIC FUNCTION PANEL: CPT | Performed by: INTERNAL MEDICINE

## 2018-02-19 PROCEDURE — 80061 LIPID PANEL: CPT | Performed by: INTERNAL MEDICINE

## 2018-02-21 NOTE — PROGRESS NOTES
Per your last office note, routine 6 month lipids, last one drawn in August, \"His latest lipid profile which was completed on March 7, 2017 was reasonably good with total cholesterol 146, triglycerides 149, HDL 41, LDL of 75.2, and VLDL of 29.8 I think this is reasonable control on his Crestor 20 mg daily. \"

## 2018-02-22 ENCOUNTER — OFFICE VISIT (OUTPATIENT)
Dept: CARDIOLOGY CLINIC | Age: 68
End: 2018-02-22

## 2018-02-22 VITALS
OXYGEN SATURATION: 97 % | SYSTOLIC BLOOD PRESSURE: 118 MMHG | BODY MASS INDEX: 34.5 KG/M2 | DIASTOLIC BLOOD PRESSURE: 76 MMHG | HEIGHT: 70 IN | HEART RATE: 88 BPM | WEIGHT: 241 LBS

## 2018-02-22 DIAGNOSIS — I48.91 ATRIAL FIBRILLATION, UNSPECIFIED TYPE (HCC): Primary | ICD-10-CM

## 2018-02-22 DIAGNOSIS — I10 ESSENTIAL HYPERTENSION: ICD-10-CM

## 2018-02-22 DIAGNOSIS — E78.00 HYPERCHOLESTEREMIA: ICD-10-CM

## 2018-02-22 DIAGNOSIS — Z98.890 S/P ABLATION OF ATRIAL FIBRILLATION: ICD-10-CM

## 2018-02-22 DIAGNOSIS — Z86.79 S/P ABLATION OF ATRIAL FIBRILLATION: ICD-10-CM

## 2018-02-22 NOTE — PROGRESS NOTES
1. Have you been to the ER, urgent care clinic since your last visit? Hospitalized since your last visit? No    2. Have you seen or consulted any other health care providers outside of the [address] [place] [address] since your last visit? Include any pap smears or colon screening.  No

## 2018-02-22 NOTE — MR AVS SNAPSHOT
[facility] [address] [address] [address] [address] [phone] Patient: Loreto Lau MRN: [id number] :1950 Visit Information Date & Time Provider Department Dept. Phone Encounter #  
 2018  2:20 PM Leif Mdconald, [address] [facility] [address] [id number] Follow-up Instructions Return in about 6 months (around 2018), or if symptoms worsen or fail to improve. Follow-up and Disposition History Your Appointments 2018  9:00 AM  
Follow Up with Leif Mcdonald DO [facility] ([address]) Appt Note: 6 month f/up [facility] [address] [address] [address] [phone] [address] Upcoming Health Maintenance Date Due Hepatitis C Screening 1950 DTaP/Tdap/Td series (1 - Tdap) 10/27/1970 ZOSTER VACCINE AGE 60> 2009 GLAUCOMA SCREENING Q2Y 10/27/2014 Pneumococcal 65+ Low/Medium Risk (1 of 2 - PCV13) 10/27/2014 MEDICARE YEARLY EXAM 10/27/2014 Influenza Age 5 to Adult 2017 COLONOSCOPY 2019 Allergies as of 2018  Review Complete On: 2018 By: Lefi Mcdonald DO Severity Noted Reaction Type Reactions Lipitor [Atorvastatin]  2016    Myalgia Current Immunizations  Never Reviewed No immunizations on file. Not reviewed this visit You Were Diagnosed With   
  
 Codes Comments At[facility] fibrillation[facility] unspecified type [facility]    -  Primary ICD-10-CM: I48.91 
ICD-9-CM: 427.31 S/P ablation of atrial fibrillation     ICD-10-CM: Z98.890, Z86.79 
ICD-9-CM: V45.89 Essential hypertension     ICD-10-CM: I10 
ICD-9-CM: 401.9 Hypercholesteremia     ICD-10-CM: E78.00 ICD-9-CM: 272.0 Vitals BP Pulse Height(growth percentile) Weight(growth percentile) SpO2 BMI 118/76 88 5' 10\" (1.778 m) 241 lb (109.3 kg) 97% 34.58 kg/m2 Smoking Status Never Smoker BMI and BSA Data Body Mass Index Body Surface Area 34.58 kg/m 2 2.32 m 2 Preferred Pharmacy Pharmacy Name Phone Raquel Bro, [address] [phone] Your Updated Medication List  
  
   
This list is accurate as of 2/22/18  3:23 PM.  Always use your most recent med list.  
  
  
  
  
 ELIQUIS 5 mg tablet Generic drug:  apixaban TAKE ONE TABLET BY MOUTH TWICE A DAY  
  
 metoprolol succinate 50 mg XL tablet Commonly known as:  TOPROL-XL  
TAKE ONE TABLET BY MOUTH DAILY  
  
 rosuvastatin 20 mg tablet Commonly known as:  CRESTOR  
TAKE ONE TABLET BY MOUTH EVERY NIGHT AT BEDTIME We Performed the Following AMB POC EKG ROUTINE W/ 12 LEADS, INTER & REP [48865 CPT(R)] Follow-up Instructions Return in about 6 months (around 8/22/2018), or if symptoms worsen or fail to improve. Introducing [facility]! [facility] introduces [employer] patient portal. Now you can access parts of your medical record, email your doctor's office, and request medication refills online. 1. In your internet browser, go to [email or web address] 2. Click on the First Time User? Click Here link in the Sign In box. You will see the New Member Sign Up page. 3. Enter your [employer] Access Code exactly as it appears below. You will not need to use this code after youve completed the sign-up process. If you do not sign up before the expiration date, you must request a new code. · [employer] Access Code: [email or web address] Expires: 5/20/2018  7:37 AM 
 
4. Enter the last four digits of your Social Security Number (xxxx) and Date of Birth (mm/dd/yyyy) as indicated and click Submit. You will be taken to the next sign-up page. 5. Create a Inogen ID.  This will be your Inogen login ID and cannot be changed, so think of one that is secure and easy to remember. 6. Create a DaisyBill password. You can change your password at any time. 7. Enter your Password Reset Question and Answer. This can be used at a later time if you forget your password. 8. Enter your e-mail address. You will receive e-mail notification when new information is available in [address] 9. Click Sign Up. You can now view and download portions of your medical record. 10. Click the Download Summary menu link to download a portable copy of your medical information. If you have questions, please visit the Frequently Asked Questions section of the [employer] website. Remember, [employer]Bill is NOT to be used for urgent needs. For medical emergencies, dial 911. Now available from your iPhone and Android! Please provide this summary of care documentation to your next provider. Your primary care clinician is listed as [phone]. If you have any questions after today's visit, please call [phone].

## 2018-02-22 NOTE — PROGRESS NOTES
HPI:   I saw Esequiel Huerta. Ely Fagan in my office today in cardiovascular evaluation regarding his paroxysmal atrial fibrillation. Mr. Ely Fagan is a pleasant 78 year old white male with history of hypertension, hypercholesterolemia and a family history of atrial fibrillation with an older brother having atrial fibrillation and apparently an atrial fibrillation ablation in the past as well. He also gives history of his mother having atrial fibrillation. He himself did not have any significant problems until 12/19/15, when he had some palpitations and was found to be in atrial flutter with 2:1 block and heart rate of 135-140. He was given IV Lopressor and ultimately converted back to sinus rhythm.      He continued to have recurrent problems with paroxysms of atrial fibrillation and ultimately was referred to Dr. Didi Tan and ultimately admitted to DR[facility] where he had atrial fibrillation pulmonary vein isolation ablation using [employer]. A Trans-esophageal echocardiogram was done prior to ablation without left atrial appendage thrombus and this was all completed on November 8, 2016. He was placed on amiodarone for a few months after the procedure but then that medication was discontinued. He comes in today and relates that he is doing well. He is staying fairly active and denies any cardiovascular symptomatology whatsoever at this time. Encounter Diagnoses   Name Primary?  Atrial fibrillation in the past remaining in sinus rhythm (HCC) Yes    S/P ablation of atrial fibrillation     Essential hypertension     Hypercholesteremia        Discussion: This gentleman is doing about as well as we could expect and I really have no recommendations for change at this time. He continues in sinus rhythm without any palpitations or any other cardiovascular complaints now over a year after his atrial fibrillation ablation therapy.   He continues on Toprol-XL 50 mg daily and Eliquis 5 mg twice daily. I think a case could be made for stopping the Eliquis, but the potential for recurrence always has to be considered and even though this may be low still may be reasonable to keep him on Eliquis long-term to guard against the potential for recurrent atrial fibrillation episodes. His latest lipid profile which was just completed on February 19, 2018 was reasonably good with total cholesterol 153, triglycerides 174, HDL 45, LDL of 73.2, and VLDL 34.8 is reasonably good control on Crestor 20 mg daily and the patient is going to be getting into an exercise program and weight loss program which should help bring this down further in the future. His blood pressure is well-controlled today and his EKG appears stable in sinus rhythm some simply get a plan to see him again in several months. PCP: Taylor Moreno MD       Past Medical History:   Diagnosis Date    Cardiac echocardiogram 01/08/2016    EF 60%. No WMA. Mod LVH. Gr 2 DDfx. Mild WALE.  Cardiac Holter monitor 02/12/2016    Mildly abnormal 48-hr Holter. Normal sinus rhythm w/a few short bursts of atrial fibrillation from 20-40 beats up to 170 bpm.  No symptoms reported.  Cardiac nuclear imaging study, normal 01/08/2016    Low risk. No ischemia or prior infarction. No RWMA. EF 63%. Neg EKG on pharm stress test.    Cardiac transesophageal echocardiography (SONDRA) 11/08/2016    EF 55%. No WMA. LVH. Mild LAE. No LA appendage thrombus. Mod WALE.  Hypercholesterolemia     Hypertension     Typical atrial flutter ([address] 12/19/2015    atrial flutter with 2 to1 block at a rate of 135 on EKG       Past Surgical History:   Procedure Laterality Date    HX CATARACT REMOVAL      HX HEMORRHOIDECTOMY      HX HEMORRHOIDECTOMY         Current Outpatient Rx   Name  Route  Sig  Dispense  Refill    metoprolol succinate (TOPROL XL) 50 mg XL tablet    Oral    Take 1 Tab by mouth daily for 30 days.     90 Tab    3      apixaban (ELIQUIS) 5 mg tablet    Oral    Take 1 Tab by mouth two (2) times a day. 60 Tab    6      pravastatin (PRAVACHOL) 80 mg tablet    Oral    Take 80 mg by mouth daily. Allergies   Allergen Reactions    Lipitor [Atorvastatin] Myalgia       Social History :  Social History   Substance Use Topics    Smoking status: Never Smoker    Smokeless tobacco: Never Used    Alcohol use Yes        Family History: family history includes Cancer in his mother; Colon Cancer in his mother; Colon Polyps in his brother and sister. Review of Systems:     Constitutional: Negative. Respiratory: Negative. Cardiovascular: Negative. Gastrointestinal: Negative. Musculoskeletal: Negative. Neurological: Negative for dizziness. Physical Exam:    The patient is a cooperative, alert, well developed, well nourished 75 y.o.   male who is in no acute distress at the time of the examination. Visit Vitals    /76    Pulse 88    Ht 5' 10\" (1.778 m)    Wt 109.3 kg (241 lb)    SpO2 97%    BMI 34.58 kg/m2       HEENT: Conjuctiva white, mucosa moist, no pallor or cyanosis. NECK: Supple without masses, tenderness or thyromegaly. There was no jugular venous distention. Carotid are full bilaterally without bruits. CHEST: Symmetrical with good excursion. LUNGS: Clear to auscultation in all fields. HEART: Irregularly irregular rhythm with a rapid ventricular response. The apex is not displaced. There were no lifts, thrills or heaves. There is a normal S1 and variable S2 without appreciable murmurs, rubs, clicks, or gallops auscultated. ABDOMEN: Soft without masses, tenderness or organomegaly. EXTREMITIES: Full peripheral pulses with trace peripheral edema.       Review of Data: See PMH and Cardiology and Imaging sections for cardiac testing      Results for orders placed or performed in visit on 02/22/18   AMB POC EKG ROUTINE W/ 12 LEADS, INTER & REP     Status: None    Narrative    Normal sinus rhythm rate 88. Normal EKG. Compared to the EKG of August 2, 2017 there was no significant interval change. Renate Olivares D.O., F.A.C.C. Cardiovascular Specialists  [facility] [place]  [address]    PLEASE NOTE:  This document has been produced using voice recognition software. Unrecognized errors in transcription may be present.

## 2018-02-25 NOTE — PROGRESS NOTES
This is still a little higher than we like with a total cholesterol above 150 in the total bad cholesterol of 100, but it is fair and I would simply continue him on his Crestor 20 mg daily and encouraged him to take it every day as well as see if he is taking it every day. If so I think I would just encourage a little diet and exercise to get his cholesterol just a little bit better.  ES

## 2018-02-27 ENCOUNTER — TELEPHONE (OUTPATIENT)
Dept: CARDIOLOGY CLINIC | Age: 68
End: 2018-02-27

## 2018-02-27 NOTE — TELEPHONE ENCOUNTER
----- Message from Angelica Elmore DO sent at 2/25/2018  4:30 PM EST -----  This is still a little higher than we like with a total cholesterol above 150 in the total bad cholesterol of 100, but it is fair and I would simply continue him on his Crestor 20 mg daily and encouraged him to take it every day as well as see if he is taking it every day. If so I think I would just encourage a little diet and exercise to get his cholesterol just a little bit better.  ES

## 2018-02-27 NOTE — TELEPHONE ENCOUNTER
Patient made aware of results and says that he will try better. He states that he is taking his Crestor every day.       Verbal order and read back per Alcon Moe, DO

## 2018-04-12 ENCOUNTER — HOSPITAL ENCOUNTER (OUTPATIENT)
Dept: GENERAL RADIOLOGY | Age: 68
Discharge: HOME | End: 2018-04-12
Payer: MEDICARE

## 2018-04-12 ENCOUNTER — HOSPITAL ENCOUNTER (OUTPATIENT)
Dept: LAB | Age: 68
Discharge: HOME | End: 2018-04-12
Payer: MEDICARE

## 2018-04-12 DIAGNOSIS — Z01.818 PRE-OP EVALUATION: ICD-10-CM

## 2018-04-12 DIAGNOSIS — R19.09 OTHER INTRA-ABDOMINAL AND PELVIC SWELLING, MASS AND LUMP: ICD-10-CM

## 2018-04-12 LAB
ANION GAP SERPL CALC-SCNC: 8 MMOL/L (ref 3–18)
ATRIAL RATE: 70 BPM
BUN SERPL-MCNC: 13 MG/DL (ref 7–18)
BUN/CREAT SERPL: 12 (ref 12–20)
CALCIUM SERPL-MCNC: 9.5 MG/DL (ref 8.5–10.1)
CALCULATED P AXIS, ECG09: 35 DEGREES
CALCULATED R AXIS, ECG10: 3 DEGREES
CALCULATED T AXIS, ECG11: 18 DEGREES
CHLORIDE SERPL-SCNC: 106 MMOL/L (ref 100–108)
CO2 SERPL-SCNC: 27 MMOL/L (ref 21–32)
CREAT SERPL-MCNC: 1.1 MG/DL (ref 0.6–1.3)
DIAGNOSIS, 93000: NORMAL
ERYTHROCYTE [DISTWIDTH] IN BLOOD BY AUTOMATED COUNT: 14 % (ref 11.6–14.5)
GLUCOSE SERPL-MCNC: 92 MG/DL (ref 74–99)
HCT VFR BLD AUTO: 44.3 % (ref 36–48)
HGB BLD-MCNC: 15 G/DL (ref 13–16)
MCH RBC QN AUTO: 31.6 PG (ref 24–34)
MCHC RBC AUTO-ENTMCNC: 33.9 G/DL (ref 31–37)
MCV RBC AUTO: 93.5 FL (ref 74–97)
P-R INTERVAL, ECG05: 162 MS
PLATELET # BLD AUTO: 318 K/UL (ref 135–420)
PMV BLD AUTO: 9 FL (ref 9.2–11.8)
POTASSIUM SERPL-SCNC: 4.7 MMOL/L (ref 3.5–5.5)
Q-T INTERVAL, ECG07: 406 MS
QRS DURATION, ECG06: 106 MS
QTC CALCULATION (BEZET), ECG08: 438 MS
RBC # BLD AUTO: 4.74 M/UL (ref 4.7–5.5)
SODIUM SERPL-SCNC: 141 MMOL/L (ref 136–145)
VENTRICULAR RATE, ECG03: 70 BPM
WBC # BLD AUTO: 6.4 K/UL (ref 4.6–13.2)

## 2018-04-12 PROCEDURE — 71046 X-RAY EXAM CHEST 2 VIEWS: CPT

## 2018-04-12 PROCEDURE — 93005 ELECTROCARDIOGRAM TRACING: CPT

## 2018-04-12 PROCEDURE — 85027 COMPLETE CBC AUTOMATED: CPT | Performed by: COLON & RECTAL SURGERY

## 2018-04-12 PROCEDURE — 36415 COLL VENOUS BLD VENIPUNCTURE: CPT | Performed by: COLON & RECTAL SURGERY

## 2018-04-12 PROCEDURE — 80048 BASIC METABOLIC PNL TOTAL CA: CPT | Performed by: COLON & RECTAL SURGERY

## 2018-04-16 PROBLEM — K57.92 DIVERTICULITIS: Status: ACTIVE | Noted: 2018-04-16

## 2018-04-16 PROBLEM — K63.89 OTHER SPECIFIED DISEASES OF INTESTINE: Status: ACTIVE | Noted: 2018-04-16

## 2018-08-29 ENCOUNTER — OFFICE VISIT (OUTPATIENT)
Dept: CARDIOLOGY CLINIC | Age: 68
End: 2018-08-29

## 2018-08-29 VITALS
HEIGHT: 70 IN | DIASTOLIC BLOOD PRESSURE: 86 MMHG | BODY MASS INDEX: 33.64 KG/M2 | SYSTOLIC BLOOD PRESSURE: 132 MMHG | HEART RATE: 74 BPM | OXYGEN SATURATION: 98 % | WEIGHT: 235 LBS

## 2018-08-29 DIAGNOSIS — Z86.79 S/P ABLATION OF ATRIAL FIBRILLATION: ICD-10-CM

## 2018-08-29 DIAGNOSIS — E78.00 HYPERCHOLESTEREMIA: ICD-10-CM

## 2018-08-29 DIAGNOSIS — I48.0 PAF (PAROXYSMAL ATRIAL FIBRILLATION) (HCC): ICD-10-CM

## 2018-08-29 DIAGNOSIS — Z98.890 S/P ABLATION OF ATRIAL FIBRILLATION: ICD-10-CM

## 2018-08-29 DIAGNOSIS — I10 ESSENTIAL HYPERTENSION: Primary | ICD-10-CM

## 2018-08-29 RX ORDER — FISH OIL/DHA/EPA 1200-144MG
1 CAPSULE ORAL DAILY
COMMUNITY

## 2018-08-29 NOTE — PROGRESS NOTES
Siddharth Espitia presents today for Chief Complaint Patient presents with  Follow-up 6 month Siddharth Espitia preferred language for health care discussion is english/other. Is someone accompanying this pt? No 
 
Is the patient using any DME equipment during OV? No 
 
Depression Screening: No flowsheet data found. Learning Assessment: 
Learning Assessment 2/5/2016 PRIMARY LEARNER Patient PRIMARY LANGUAGE ENGLISH  
LEARNER PREFERENCE PRIMARY DEMONSTRATION  
ANSWERED BY patient RELATIONSHIP SELF Abuse Screening: No flowsheet data found. Fall Risk No flowsheet data found. Pt currently taking Antiplatelet therapy? No 
 
Coordination of Care: 1. Have you been to the ER, urgent care clinic since your last visit? Hospitalized since your last visit? No 
 
2. Have you seen or consulted any other health care providers outside of the [address] [place] [address] since your last visit? Include any pap smears or colon screening.  No

## 2018-08-29 NOTE — MR AVS SNAPSHOT
[address] [place] [address] [address] [place] [id number] 
[phone] Patient: Rosina Cedeno MRN: [id number] :1950 Visit Information Date & Time Provider Department Dept. Phone Encounter #  
 2018  9:00 AM Jaki He DO [facility] [address] [id number] Your Appointments 2019  8:30 AM  
[facility] Appt Note: Return in about 1 year (around 2019) for with renal ultrasound. [address] [facility] [address] [facility] [id number]  
  
    
 2019  9:15 AM  
ESTABLISHED PATIENT with Manuel Jane MD  
[facility]) [facility], [address] [facility] [address]  
  
   
 [facility], [address] [place] [address] Upcoming Health Maintenance Date Due Hepatitis C Screening 1950 DTaP/Tdap/Td series (1 - Tdap) 10/27/1970 ZOSTER VACCINE AGE 60> 2009 GLAUCOMA SCREENING Q2Y 10/27/2014 Pneumococcal 65+ Low/Medium Risk (1 of 2 - PCV13) 10/27/2014 MEDICARE YEARLY EXAM 3/14/2018 Influenza Age 5 to Adult 2018 COLONOSCOPY 2019 Allergies as of 2018  Review Complete On: 2018 By: Jaki He DO Severity Noted Reaction Type Reactions Lipitor [Atorvastatin]  2016    Myalgia Current Immunizations  Never Reviewed No immunizations on file. Not reviewed this visit You Were Diagnosed With   
  
 Codes Comments Essential hypertension    -  Primary ICD-10-CM: I10 
ICD-9-CM: 401.9 PAF (paroxysmal atrial fibrillation) (HCC)     ICD-10-CM: I48.0 ICD-9-CM: 427.31  S/P ablation of atrial fibrillation     ICD-10-CM: Z98.890, Z86.79 
ICD-9-CM: V45.89   
 Hypercholesteremia     ICD-10-CM: E78.00 ICD-9-CM: 272.0 Vitals BP Pulse Height(growth percentile) Weight(growth percentile) SpO2 BMI  
 132/86 74 5' 10\" (1.778 m) 235 lb (106.6 kg) 98% 33.72 kg/m2 Smoking Status Never Smoker Vitals History BMI and BSA Data Body Mass Index Body Surface Area 33.72 kg/m 2 2.29 m 2 Preferred Pharmacy Pharmacy Name Phone [facility] [address], [address] [place] [address] Road [phone] Your Updated Medication List  
  
   
This list is accurate as of 8/29/18  9:07 AM.  Always use your most recent med list.  
  
  
  
  
 CENTRUM SILVER PO Take  by mouth. CLARITIN 10 mg tablet Generic drug:  loratadine Take 10 mg by mouth. fish oil-dha-epa 1,200-144-216 mg Cap Take  by mouth.  
  
 metoprolol succinate 50 mg XL tablet Commonly known as:  TOPROL-XL  
TAKE ONE TABLET BY MOUTH DAILY  
  
 rosuvastatin 20 mg tablet Commonly known as:  CRESTOR  
TAKE ONE TABLET BY MOUTH EVERY NIGHT AT BEDTIME  
  
 SINGULAIR 10 mg tablet Generic drug:  montelukast  
Take 10 mg by mouth daily. VITAMIN B-12 PO Take  by mouth. We Performed the Following AMB POC EKG ROUTINE W/ 12 LEADS, INTER & REP [24752 CPT(R)] Introducing [facility] SERVICES! Dear Kateryna Graham: Thank you for requesting a [employer] account. Our records indicate that you already have an active Octavian account. You can access your account anytime at [email or web address] Did you know that you can access your hospital and ER discharge instructions at any time in Octavian? You can also review all of your test results from your hospital stay or ER visit. Additional Information If you have questions, please visit the Frequently Asked Questions section of the Octavian website at [email or web address] Remember, Octavian is NOT to be used for urgent needs.  For medical emergencies, dial 911. Now available from your iPhone and Android! Please provide this summary of care documentation to your next provider. Your primary care clinician is listed as [address]Spen[address] Yap. If you have any questions after today's visit, please call [phone].

## 2018-08-29 NOTE — PROGRESS NOTES
Review of Systems Constitutional: Negative. Respiratory: Positive for cough. Negative for hemoptysis, shortness of breath and wheezing. Cardiovascular: Negative. Gastrointestinal: Positive for heartburn. Negative for abdominal pain, blood in stool, constipation, diarrhea, melena, nausea and vomiting. Musculoskeletal: Negative.

## 2018-08-29 NOTE — PROGRESS NOTES
HPI:   I saw Roslyn Hola Reyna in my office today in cardiovascular evaluation regarding his paroxysmal atrial fibrillation. Mr. Cecy Reyna is a pleasant 77 year old white male with history of hypertension, hypercholesterolemia and a family history of atrial fibrillation with an older brother having atrial fibrillation and apparently an atrial fibrillation ablation in the past as well. He also gives history of his mother having atrial fibrillation. He himself did not have any significant problems until 12/19/15, when he had some palpitations and was found to be in atrial flutter with 2:1 block and heart rate of 135-140. He was given IV Lopressor and ultimately converted back to sinus rhythm.  
   
He continued to have recurrent problems with paroxysms of atrial fibrillation and ultimately was referred to Dr. Emy Lane and ultimately admitted to [facility] where he had atrial fibrillation [facility] vein [facility] [employer] [facility].  A Trans-esophageal echocardiogram was done prior to ablation without left atrial appendage thrombus and this was all completed on November 8, 2016. He was placed on amiodarone for a few months after the procedure, but then that medication was discontinued. 
  
He comes in today relates that he is continued to do quite well. He is staying fairly active and really is having no cardiovascular symptomatology whatsoever at this time. Encounter Diagnoses Name Primary?  PAF (paroxysmal atrial fibrillation) (HCC) remaining in sinus rhythm  S/P ablation of atrial fibrillation  Essential hypertension Yes  Hypercholesteremia Discussion: This gentleman appears to be doing about as well as we could expect.   He is remaining in sinus rhythm and we have discussed coming off of Eliquis in the past and following her recent surgery with where he had a bowel obstruction that was fortunately noncancerous he has stayed off of the Eliquis and he is remaining in sinus rhythm and I think that that is reasonable. He understands that there is always some risk that he may go back in atrial fibrillation and if he does not know about it this could increase his risk of stroke, but he has remained in sinus rhythm for nearly 2 years from his ablation so I think it is reasonable for him to stay off of anticoagulation at this time. His latest lipid profile that I have a copy of was done in February 19, 2018 showed total cholesterol 153 with triglycerides of 174, HDL 45, LDL of 73.2, and VLDL 34.8 which I think is fairly good control on Crestor 20 mg daily. His blood pressure is on the higher side of normal today but is gained some weight since he had his surgery and he is going to be working on trying to lose that weight and hopefully his blood pressure will decrease and since he is otherwise doing well plan see him again in several months. PCP: Lorri Isaac MD  
 
 
Past Medical History:  
Diagnosis Date  Cardiac echocardiogram 01/08/2016 EF 60%. No WMA. Mod LVH. Gr 2 DDfx. Mild WALE.  Cardiac Holter monitor 02/12/2016 Mildly abnormal 48-hr Holter. Normal sinus rhythm w/a few short bursts of atrial fibrillation from 20-40 beats up to 170 bpm.  No symptoms reported.  Cardiac nuclear imaging study, normal 01/08/2016 Low risk. No ischemia or prior infarction. No RWMA. EF 63%. Neg EKG on pharm stress test.  
 Cardiac transesophageal echocardiography (SONDRA) 11/08/2016 EF 55%. No WMA. LVH. Mild LAE. No LA appendage thrombus. Mod WALE.  Hypercholesterolemia  Hypertension  Typical atrial flutter ([address] 12/19/2015  
 atrial flutter with 2 to1 block at a rate of 135 on EKG Past Surgical History:  
Procedure Laterality Date  CARDIAC SURG PROCEDURE UNLIST    
 ablation  11/16  HX CATARACT REMOVAL    
 HX HEMORRHOIDECTOMY  HX HEMORRHOIDECTOMY Current Outpatient Rx Name  Route  Sig  Dispense  Refill  metoprolol succinate (TOPROL XL) 50 mg XL tablet Oral 
  Take 1 Tab by mouth daily for 30 days. 90 Tab 
  3 
  
 apixaban (ELIQUIS) 5 mg tablet Oral 
  Take 1 Tab by mouth two (2) times a day. 60 Tab 
  6  pravastatin (PRAVACHOL) 80 mg tablet Oral 
  Take 80 mg by mouth daily. Allergies Allergen Reactions  Lipitor [Atorvastatin] Myalgia Social History : 
Social History Substance Use Topics  Smoking status: Never Smoker  Smokeless tobacco: Never Used  Alcohol use Yes Family History: family history includes Alzheimer in his father; Cancer in his mother; Colon Cancer in his mother; Colon Polyps in his brother and sister; Heart Disease in his brother, father, and mother. Review of Systems:  
 Constitutional: Negative. Respiratory: Positive for cough. Negative for hemoptysis, shortness of breath and wheezing. Cardiovascular: Negative. Gastrointestinal: Positive for heartburn. Negative for abdominal pain, blood in stool, constipation, diarrhea, melena, nausea and vomiting. Musculoskeletal: Negative. Physical Exam:  The patient is a cooperative, alert, well developed, well nourished 75 y.o.   male who is in no acute distress at the time of the examination. Visit Vitals  /86  Pulse 74  Ht 5' 10\" (1.778 m)  Wt 106.6 kg (235 lb)  SpO2 98%  BMI 33.72 kg/m2 HEENT: Conjuctiva white, mucosa moist, no pallor or cyanosis. NECK: Supple without masses, tenderness or thyromegaly. There was no jugular venous distention. Carotid are full bilaterally without bruits. CHEST: Symmetrical with good excursion. LUNGS: Clear to auscultation in all fields. HEART: Irregularly irregular rhythm with a rapid ventricular response. The apex is not displaced. There were no lifts, thrills or heaves.  There is a normal S1 and variable S2 without appreciable murmurs, rubs, clicks, or gallops auscultated. ABDOMEN: Soft without masses, tenderness or organomegaly. EXTREMITIES: Full peripheral pulses with trace peripheral edema. Review of Data: See PMH and Cardiology and Imaging sections for cardiac testing Results for orders placed or performed in visit on 08/29/18 AMB POC EKG ROUTINE W/ 12 LEADS, INTER & REP     Status: None Narrative Normal sinus rhythm rate 74. Incomplete bundle branch block more of right bundle branch block variety. Early R-wave transition anteriorly. This EKG is fairly similar to the EKG of April 12, 2018. Augustina Etienne D.O., F.A.C.C. [facility] [place] [address] [facility] [address] [facility] [place] [address] [place] [phone] PLEASE NOTE:  This document has been produced using voice recognition software. Unrecognized errors in transcription may be present.

## 2018-09-01 RX ORDER — METOPROLOL SUCCINATE 50 MG/1
TABLET, EXTENDED RELEASE ORAL
Qty: 90 TAB | Refills: 2 | Status: SHIPPED | OUTPATIENT
Start: 2018-09-01 | End: 2019-05-25 | Stop reason: SDUPTHER

## 2018-10-02 RX ORDER — ROSUVASTATIN CALCIUM 20 MG/1
TABLET, COATED ORAL
Qty: 90 TAB | Refills: 3 | Status: SHIPPED | OUTPATIENT
Start: 2018-10-02 | End: 2019-08-23 | Stop reason: SDUPTHER

## 2019-03-19 ENCOUNTER — OFFICE VISIT (OUTPATIENT)
Dept: CARDIOLOGY CLINIC | Age: 69
End: 2019-03-19

## 2019-03-19 VITALS
WEIGHT: 244 LBS | OXYGEN SATURATION: 97 % | DIASTOLIC BLOOD PRESSURE: 82 MMHG | HEIGHT: 70 IN | HEART RATE: 84 BPM | BODY MASS INDEX: 34.93 KG/M2 | SYSTOLIC BLOOD PRESSURE: 130 MMHG

## 2019-03-19 DIAGNOSIS — E78.00 HYPERCHOLESTEREMIA: ICD-10-CM

## 2019-03-19 DIAGNOSIS — I48.0 PAF (PAROXYSMAL ATRIAL FIBRILLATION) (HCC): Primary | ICD-10-CM

## 2019-03-19 DIAGNOSIS — I10 ESSENTIAL HYPERTENSION: ICD-10-CM

## 2019-03-19 DIAGNOSIS — Z86.79 S/P ABLATION OF ATRIAL FIBRILLATION: ICD-10-CM

## 2019-03-19 DIAGNOSIS — G47.33 OBSTRUCTIVE SLEEP APNEA SYNDROME: ICD-10-CM

## 2019-03-19 DIAGNOSIS — Z98.890 S/P ABLATION OF ATRIAL FIBRILLATION: ICD-10-CM

## 2019-03-19 PROBLEM — E66.01 SEVERE OBESITY (HCC): Status: ACTIVE | Noted: 2019-03-19

## 2019-03-19 NOTE — PROGRESS NOTES
HPI:  I saw Shala MadridTrish Lopez in my office today in cardiovascular evaluation regarding his paroxysmal atrial fibrillation. Mr. Paul Lopez is a pleasant 76 year old white male with history of hypertension, hypercholesterolemia and a family history of atrial fibrillation with an older brother having atrial fibrillation and apparently an atrial fibrillation ablation in the past as well. He also gives history of his mother having atrial fibrillation. He himself did not have any significant problems until 12/19/15, when he had some palpitations and was found to be in atrial flutter with 2:1 block and heart rate of 135-140. He was given IV Lopressor and ultimately converted back to sinus rhythm.  
   
He continued to have recurrent problems with paroxysms of atrial fibrillation and ultimately was referred to Dr. Henry Solitario and ultimately admitted to [facility] where he had atrial fibrillation pulmonary vein isolation ablation using [employer] [address] [place] [facility].  A Trans-esophageal echocardiogram was done prior to ablation without left atrial appendage thrombus and this was all completed on November 8, 2016.  He was placed on amiodarone for a few months after the procedure, but then that medication was discontinued. 
  
He comes in today relates that he is really doing quite well. He is remaining fairly active and denies any cardiovascular symptomatology at this time. He specifically has had no problems with palpitations on his Toprol-XL 50 mg 1 tablet daily. He is no longer taking any anticoagulation therapy. Encounter Diagnoses Name Primary?  PAF (paroxysmal atrial fibrillation) ([facility]) remaining in sinus rhythm Yes  S/P ablation of atrial fibrillation  Essential hypertension  Hypercholesteremia  Obstructive sleep apnea syndrome pm BiPAP Discussion:  This gentleman appears to be doing reasonably well from a cardiovascular vantage. He is not having any palpitations but he did have an atrial couplet on his EKG today and he is not on any anticoagulation at this point so I would like to do a 24-hour Holter monitor study just to be sure he is not having any breakthrough episodes of atrial fibrillation which might warrant us to consider anticoagulation again. His latest lipid profile that I have a copy of was done back in September 2018 and he is going to be getting another one in the near future. His cholesterol at that time was 153 with triglycerides of 174, HDL 45, LDL of 73.2, and VLDL of 34.8 which is slightly suboptimal control on Crestor 20 mg daily. Hopefully his repeat levels will have a total cholesterol under 150 and an LDL under 70. His blood pressure is very well controlled today and his EKG otherwise appears to be stable and since he is having no other cardiovascular symptoms I will simply plan to see him again in several months. PCP: Dagoberto Lopez MD  
 
 
Past Medical History:  
Diagnosis Date  Cardiac echocardiogram 01/08/2016 EF 60%. No WMA. Mod LVH. Gr 2 DDfx. Mild WALE.  Cardiac Holter monitor 02/12/2016 Mildly abnormal 48-hr Holter. Normal sinus rhythm w/a few short bursts of atrial fibrillation from 20-40 beats up to 170 bpm.  No symptoms reported.  Cardiac nuclear imaging study, normal 01/08/2016 Low risk. No ischemia or prior infarction. No RWMA. EF 63%. Neg EKG on pharm stress test.  
 Cardiac transesophageal echocardiography (SONDRA) 11/08/2016 EF 55%. No WMA. LVH. Mild LAE. No LA appendage thrombus. Mod WALE.  Hypercholesterolemia  Hypertension  Typical atrial flutter ([address] 12/19/2015  
 atrial flutter with 2 to1 block at a rate of 135 on EKG Past Surgical History:  
Procedure Laterality Date  CARDIAC SURG PROCEDURE UNLIST    
 ablation  11/16  HX CATARACT REMOVAL    
 HX HEMORRHOIDECTOMY  HX HEMORRHOIDECTOMY Current Outpatient Medications Medication Sig  
 rosuvastatin (CRESTOR) 20 mg tablet TAKE ONE TABLET BY MOUTH EVERY NIGHT AT BEDTIME  metoprolol succinate (TOPROL-XL) 50 mg XL tablet TAKE ONE TABLET BY MOUTH DAILY  cyanocobalamin, vitamin B-12, (VITAMIN B-12 PO) Take  by mouth.  fish oil-dha-epa 1,200-144-216 mg cap Take  by mouth.  folic acid/multivit-min/lutein (CENTRUM SILVER PO) Take  by mouth.  montelukast (SINGULAIR) 10 mg tablet Take 10 mg by mouth daily.  loratadine (CLARITIN) 10 mg tablet Take 10 mg by mouth. No current facility-administered medications for this visit. Allergies Allergen Reactions  Lipitor [Atorvastatin] Myalgia Social History : 
Social History Tobacco Use  Smoking status: Never Smoker  Smokeless tobacco: Never Used Substance Use Topics  Alcohol use: Yes Family History: family history includes Alzheimer in his father; Cancer in his mother; Colon Cancer in his mother; Colon Polyps in his brother and sister; Heart Disease in his brother, father, and mother. Review of Systems:  
Constitutional: Negative. Respiratory: Negative. Cardiovascular: Negative. Gastrointestinal: Negative. Musculoskeletal: Negative. Neurological: Negative for dizziness. Physical Exam:   
The patient is a cooperative, alert, well developed, well nourished 70 y.o.   male who is in no acute distress at the time of the examination. Visit Vitals /82 Pulse 84 Ht 5' 10\" (1.778 m) Wt 110.7 kg (244 lb) SpO2 97% BMI 35.01 kg/m² HEENT: Conjuctiva white, mucosa moist, no pallor or cyanosis. NECK: Supple without masses, tenderness or thyromegaly. There was no jugular venous distention. Carotid are full bilaterally without bruits. CHEST: Symmetrical with good excursion. LUNGS: Clear to auscultation in all fields. HEART: Irregularly irregular rhythm with a rapid ventricular response.  The apex is not displaced. There were no lifts, thrills or heaves. There is a normal S1 and variable S2 without appreciable murmurs, rubs, clicks, or gallops auscultated. ABDOMEN: Soft without masses, tenderness or organomegaly. EXTREMITIES: Full peripheral pulses with trace peripheral edema. Review of Data: See PMH and Cardiology and Imaging sections for cardiac testing Results for orders placed or performed in visit on 03/19/19 AMB POC EKG ROUTINE W/ 12 LEADS, INTER & REP     Status: None Narrative Normal sinus rhythm with rate 84. There was one atrial couplet. Compared to the EKG of August 9, 2018 the atrial couplet was new. [facility] BRIAN Villeda., F.A.C.C. [facility] [place] [address] [place] [address] [address] [facility] [address] [facility] [phone] PLEASE NOTE:  This document has been produced using voice recognition software. Unrecognized errors in transcription may be present.

## 2019-03-25 ENCOUNTER — HOSPITAL ENCOUNTER (OUTPATIENT)
Dept: NON INVASIVE DIAGNOSTICS | Age: 69
Discharge: HOME | End: 2019-03-25
Attending: INTERNAL MEDICINE
Payer: MEDICARE

## 2019-03-25 DIAGNOSIS — I48.0 PAF (PAROXYSMAL ATRIAL FIBRILLATION) (HCC): ICD-10-CM

## 2019-03-25 PROCEDURE — 93225 XTRNL ECG REC<48 HRS REC: CPT

## 2019-04-01 ENCOUNTER — TELEPHONE (OUTPATIENT)
Dept: CARDIOLOGY CLINIC | Age: 69
End: 2019-04-01

## 2019-04-01 NOTE — PROGRESS NOTES
Per your last note \"This gentleman appears to be doing reasonably well from a cardiovascular vantage. He is not having any palpitations but he did have an atrial couplet on his EKG today and he is not on any anticoagulation at this point so I would like to do a 24-hour Holter monitor study just to be sure he is not having any breakthrough episodes of atrial fibrillation which might warrant us to consider anticoagulation again.

## 2019-04-01 NOTE — TELEPHONE ENCOUNTER
----- Message from Isaac Gallagher RN sent at 4/1/2019 12:25 PM EDT ----- Per your last note \"This gentleman appears to be doing reasonably well from a cardiovascular vantage. He is not having any palpitations but he did have an atrial couplet on his EKG today and he is not on any anticoagulation at this point so I would like to do a 24-hour Holter monitor study just to be sure he is not having any breakthrough episodes of atrial fibrillation which might warrant us to consider anticoagulation again.

## 2019-04-01 NOTE — TELEPHONE ENCOUNTER
I called and talked to the patient about his Holter. He was in normal sinus rhythm with rate ectopics and no atrial fibrillation. No need for anticoagulation at this time.  ES

## 2019-04-01 NOTE — TELEPHONE ENCOUNTER
----- Message from Charanjit Fraga RN sent at 4/1/2019 12:25 PM EDT ----- Per your last note \"This gentleman appears to be doing reasonably well from a cardiovascular vantage. He is not having any palpitations but he did have an atrial couplet on his EKG today and he is not on any anticoagulation at this point so I would like to do a 24-hour Holter monitor study just to be sure he is not having any breakthrough episodes of atrial fibrillation which might warrant us to consider anticoagulation again.

## 2019-05-31 RX ORDER — METOPROLOL SUCCINATE 50 MG/1
TABLET, EXTENDED RELEASE ORAL
Qty: 90 TAB | Refills: 3 | Status: SHIPPED | OUTPATIENT
Start: 2019-05-31 | End: 2019-06-03 | Stop reason: SDUPTHER

## 2019-06-04 RX ORDER — METOPROLOL SUCCINATE 50 MG/1
TABLET, EXTENDED RELEASE ORAL
Qty: 90 TAB | Refills: 3 | Status: SHIPPED | OUTPATIENT
Start: 2019-06-04 | End: 2020-08-25

## 2019-08-23 RX ORDER — ROSUVASTATIN CALCIUM 20 MG/1
TABLET, COATED ORAL
Qty: 90 TAB | Refills: 3 | Status: SHIPPED | OUTPATIENT
Start: 2019-08-23 | End: 2020-08-18

## 2019-09-17 ENCOUNTER — OFFICE VISIT (OUTPATIENT)
Dept: CARDIOLOGY CLINIC | Age: 69
End: 2019-09-17

## 2019-09-17 VITALS
HEART RATE: 68 BPM | HEIGHT: 70 IN | DIASTOLIC BLOOD PRESSURE: 82 MMHG | BODY MASS INDEX: 35.5 KG/M2 | WEIGHT: 248 LBS | OXYGEN SATURATION: 97 % | SYSTOLIC BLOOD PRESSURE: 120 MMHG

## 2019-09-17 DIAGNOSIS — I10 ESSENTIAL HYPERTENSION: ICD-10-CM

## 2019-09-17 DIAGNOSIS — I48.0 PAF (PAROXYSMAL ATRIAL FIBRILLATION) (HCC): Primary | ICD-10-CM

## 2019-09-17 DIAGNOSIS — E78.00 HYPERCHOLESTEREMIA: ICD-10-CM

## 2019-09-17 NOTE — PROGRESS NOTES
HPI:   I saw Olu Potter in my office today in cardiovascular evaluation regarding his paroxysmal atrial fibrillation. Mr. Krunal Potter is a pleasant 76 year old white male with history of hypertension, hypercholesterolemia and a family history of atrial fibrillation with an older brother having atrial fibrillation and apparently an atrial fibrillation ablation in the past as well. He also gives history of his mother having atrial fibrillation. He himself did not have any significant problems until 12/19/15, when he had some palpitations and was found to be in atrial flutter with 2:1 block and heart rate of 135-140. He was given IV Lopressor and ultimately converted back to sinus rhythm.       He continued to have recurrent problems with paroxysms of atrial fibrillation and ultimately was referred to Dr. Latesha Nuñez and ultimately [facility] where he had atrial fibrillation pulmonary vein isolation [facility].  A Trans-esophageal echocardiogram was done prior to ablation without left atrial appendage thrombus and this was all completed on November 8, 2016.  He was placed on amiodarone for a few months after the procedure, but then that medication was discontinued. He comes in today relates that he is doing quite well. He is remaining fairly active and denies any palpitations, chest pain, shortness of breath or any other cardiovascular complaints. Encounter Diagnoses   Name Primary?  PAF (paroxysmal atrial fibrillation) (HCC) remaining in sinus rhythm Yes    Essential hypertension     Hypercholesteremia        Discussion: This patient appears to be doing about as well as we could expect and I have no recommendations for change at this time.   He is not having any symptoms to suggest the development of recurrent atrial fibrillation and I should note that we did do a Holter monitor study back on March 25, 2019 which demonstrated simply sinus rhythm with very rare ventricular and supraventricular ectopics without atrial fibrillation or pauses. He is going to be seeing Dr. Mira Singleton later this week and will be getting a new lipid profile. He historically has had cholesterols in the 150s with total non-HDL cholesterol is a little over 100 on Crestor 20 mg daily and hopefully he is at that level or better. He is not diabetic and only smoked for a couple of years and college with no family history of heart disease so I do not think we have to be any more aggressive than keeping his total cholesterols in total non-HDL cholesterols in the 150 and 100 range respectively. His blood pressure is well controlled today and his EKG is stable and since he is otherwise doing well I will simply plan to see him again in several months unless any new cardiovascular symptoms should develop.               PCP: Yoko Phillips MD       Past Medical History:   Diagnosis Date    Cardiac echocardiogram 01/08/2016    EF 60%. No WMA. Mod LVH. Gr 2 DDfx. Mild WALE.  Cardiac Holter monitor 02/12/2016    Mildly abnormal 48-hr Holter. Normal sinus rhythm w/a few short bursts of atrial fibrillation from 20-40 beats up to 170 bpm.  No symptoms reported.  Cardiac nuclear imaging study, normal 01/08/2016    Low risk. No ischemia or prior infarction. No RWMA. EF 63%. Neg EKG on pharm stress test.    Cardiac transesophageal echocardiography (SONDRA) 11/08/2016    EF 55%. No WMA. LVH. Mild LAE. No LA appendage thrombus. Mod WALE.       Hypercholesterolemia     Hypertension     Typical atrial flutter ([address] 12/19/2015    atrial flutter with 2 to1 block at a rate of 135 on EKG       Past Surgical History:   Procedure Laterality Date    CARDIAC SURG PROCEDURE UNLIST      ablation  11/16    HX CATARACT REMOVAL      HX HEMORRHOIDECTOMY      HX HEMORRHOIDECTOMY         Current Outpatient Medications   Medication Sig    rosuvastatin (CRESTOR) 20 mg tablet TAKE ONE TABLET BY MOUTH EVERY NIGHT AT BEDTIME    metoprolol succinate (TOPROL-XL) 50 mg XL tablet TAKE ONE TABLET BY MOUTH DAILY    cyanocobalamin, vitamin B-12, (VITAMIN B-12 PO) Take  by mouth.  fish oil-dha-epa 1,200-144-216 mg cap Take  by mouth.  folic acid/multivit-min/lutein (CENTRUM SILVER PO) Take  by mouth.  montelukast (SINGULAIR) 10 mg tablet Take 10 mg by mouth daily.  loratadine (CLARITIN) 10 mg tablet Take 10 mg by mouth. No current facility-administered medications for this visit. Allergies   Allergen Reactions    Lipitor [Atorvastatin] Myalgia       Social History :  Social History     Tobacco Use    Smoking status: Never Smoker    Smokeless tobacco: Never Used   Substance Use Topics    Alcohol use: Yes        Family History: family history includes Alzheimer in his father; Cancer in his mother; Colon Cancer in his mother; Colon Polyps in his brother and sister; Heart Disease in his brother, father, and mother. Review of Systems:   Constitutional: Negative. Respiratory: Negative. Cardiovascular: Positive for orthopnea. Negative for chest pain, palpitations and leg swelling. Gastrointestinal: Negative. Musculoskeletal: Negative. Neurological: Negative for dizziness. Physical Exam:    The patient is a cooperative, alert, well developed, well nourished 70 y.o.   male who is in no acute distress at the time of the examination. Visit Vitals  /82   Pulse 68   Ht 5' 10\" (1.778 m)   Wt 112.5 kg (248 lb)   SpO2 97%   BMI 35.58 kg/m²       HEENT: Conjuctiva white, mucosa moist, no pallor or cyanosis. NECK: Supple without masses, tenderness or thyromegaly. There was no jugular venous distention. Carotid are full bilaterally without bruits. CHEST: Symmetrical with good excursion. LUNGS: Clear to auscultation in all fields. HEART: Irregularly irregular rhythm with a rapid ventricular response. The apex is not displaced.  There were no lifts, thrills or heaves. There is a normal S1 and variable S2 without appreciable murmurs, rubs, clicks, or gallops auscultated. ABDOMEN: Soft without masses, tenderness or organomegaly. EXTREMITIES: Full peripheral pulses with trace peripheral edema. Review of Data: See PMH and Cardiology and Imaging sections for cardiac testing      Results for orders placed or performed in visit on 09/17/19   AMB POC EKG ROUTINE W/ 12 LEADS, INTER & REP     Status: None    Narrative    Normal sinus rhythm rate 68. This EKG is within normal limits and similar to the EKG of March 19, 2019 except the [facility] is noted at that time are no longer seen. Ladonna Kim D.O., F.A.C.C. Cardiovascular Specialists  [facility] [place]  [address] [place] [address]. [address]    PLEASE NOTE:  This document has been produced using voice recognition software. Unrecognized errors in transcription may be present.

## 2020-04-27 ENCOUNTER — TELEPHONE (OUTPATIENT)
Dept: CARDIOLOGY CLINIC | Age: 70
End: 2020-04-27

## 2020-04-27 DIAGNOSIS — I48.0 PAF (PAROXYSMAL ATRIAL FIBRILLATION) (HCC): Primary | ICD-10-CM

## 2020-04-27 NOTE — TELEPHONE ENCOUNTER
This patient called the office to inform Dr. Shemar Montez that while walking around the neighborhood his heart beat begins to rise to 126. Yesterday morning with walking around the neighborhood it raised to 184 then dropped to 173 then dropped back down to 115. He has not kept an eye on it but now that he is walking again he has noticed it. I asked him if he gets chest pain or dizziness and he responded with a no. He says he has Afib but he doesn't feel the palpitations but does get SOB. He was wondering what he should do when this happens. His phone number is [phone]

## 2020-04-28 NOTE — TELEPHONE ENCOUNTER
Called and talked to the patient it sounds as if he is having paroxysms of atrial fibrillation with exercise at higher levels of exertion. I like to do a 2-week event monitor on him to see if we can document if he is indeed having atrial fibrillation and if he is having any episodes at rest.  Certainly if he is having significant episodes of atrial fibrillation and need to go back on Eliquis moving forward. Please schedule him for a 14-day event monitor as soon as possible.  ES

## 2020-06-02 ENCOUNTER — OFFICE VISIT (OUTPATIENT)
Dept: CARDIOLOGY CLINIC | Age: 70
End: 2020-06-02

## 2020-06-02 VITALS
DIASTOLIC BLOOD PRESSURE: 82 MMHG | OXYGEN SATURATION: 97 % | HEART RATE: 60 BPM | HEIGHT: 70 IN | BODY MASS INDEX: 35.65 KG/M2 | WEIGHT: 249 LBS | SYSTOLIC BLOOD PRESSURE: 132 MMHG

## 2020-06-02 DIAGNOSIS — R06.02 SHORTNESS OF BREATH ON EXERTION: ICD-10-CM

## 2020-06-02 DIAGNOSIS — E78.00 HYPERCHOLESTEREMIA: ICD-10-CM

## 2020-06-02 DIAGNOSIS — I48.0 PAF (PAROXYSMAL ATRIAL FIBRILLATION) (HCC): Primary | ICD-10-CM

## 2020-06-02 DIAGNOSIS — I10 ESSENTIAL HYPERTENSION: ICD-10-CM

## 2020-06-02 DIAGNOSIS — Z86.79 S/P ABLATION OF ATRIAL FIBRILLATION: ICD-10-CM

## 2020-06-02 DIAGNOSIS — G47.33 OBSTRUCTIVE SLEEP APNEA SYNDROME: ICD-10-CM

## 2020-06-02 DIAGNOSIS — Z98.890 S/P ABLATION OF ATRIAL FIBRILLATION: ICD-10-CM

## 2020-06-02 DIAGNOSIS — R00.2 PALPITATIONS: ICD-10-CM

## 2020-06-02 NOTE — PROGRESS NOTES
HPI:   I saw Marchqian Nik Tineo Ala in my office today in cardiovascular evaluation regarding his paroxysmal atrial fibrillation and some increase in palpitations he has had recently. Mr. Noman Gregg is a pleasant 73 year old white male with history of hypertension, hypercholesterolemia and a family history of atrial fibrillation with an older brother having atrial fibrillation and apparently an atrial fibrillation ablation in the past as well. He also gives history of his mother having atrial fibrillation. He himself did not have any significant problems until 12/19/15, when he had some palpitations and was found to be in atrial flutter with 2:1 block and heart rate of 135-140. He was given IV Lopressor and ultimately converted back to sinus rhythm.       He continued to have recurrent problems with paroxysms of atrial fibrillation and ultimately was referred to Dr. Sun Dejesus and was admitted to [facility] where he had atrial fibrillation pulmonary vein isolation ablation using Medtronic [address] [place].  A Trans-esophageal echocardiogram was done prior to ablation without left atrial appendage thrombus and this was all completed on November 8, 2016.  He was placed on amiodarone for a few months after the procedure, but then that medication was discontinued. He comes in today relates that over the past few weeks he has been noticing some irregularity of his heart rhythm when he exercises. We ultimately did an event monitor on him between May 9, 2020 and May 22, 2020 and on that evaluation he was found to have sinus bradycardia to mild sinus tachycardia with occasional PACs when he was mildly tachycardic. He did not have any atrial fibrillation. He does have a little shortness of breath with exertion but he thinks that this may be related to the fact that he has been putting on some weight since he has not been very active staying at home with the COVID-19 pandemic.     Encounter Diagnoses   Name Primary?  Palpitations     PAF (paroxysmal atrial fibrillation) (HCC) remaining in sinus rhythm Yes    S/P ablation of atrial fibrillation     Essential hypertension     Hypercholesteremia     Obstructive sleep apnea syndrome on BiPAP     Shortness of breath on exertion        Discussion: This patient appears to be doing about as well as we could expect and I have no recommendations for change at this time. He has had some increase in palpitations with exercise, but when we did do an event monitor on him for a couple of weeks between May 9, 2020 and May 22, 2020 he just had sinus bradycardia to mild sinus tachycardia and rare PACs without atrial fibrillation. I would simply recommend that he continue on Toprol XL 50 mg daily and if the palpitations become more frequent to let us know. He does have a smart phone device to assess his rhythm which she will do from time to time if he is concerned. He is on Crestor 20 mg daily for his cholesterol and tells me that his latest lipid profile was quite good and he is getting ready to have another one completed through Dr. Darlin Zaidi office so we will see if we can get a copy for our records. Hopefully his total cholesterol is under 150 and LDL under 70 which would be our goal.    His blood pressure is well controlled today and his EKG is stable and since he is otherwise doing well I will simply plan to see him again in several months.     PCP: Mildred Dempsey MD       Past Medical History:   Diagnosis Date    Cardiac echocardiogram 01/08/2016    EF 60%. No WMA. Mod LVH. Gr 2 DDfx. Mild WALE.  Cardiac Holter monitor 02/12/2016    Mildly abnormal 48-hr Holter. Normal sinus rhythm w/a few short bursts of atrial fibrillation from 20-40 beats up to 170 bpm.  No symptoms reported.  Cardiac nuclear imaging study, normal 01/08/2016    Low risk. No ischemia or prior infarction. No RWMA. EF 63%.   Neg EKG on pharm stress test.    Cardiac transesophageal echocardiography (SONDRA) 11/08/2016    EF 55%. No WMA. LVH. Mild LAE. No LA appendage thrombus. Mod WALE.  Hypercholesterolemia     Hypertension     Typical atrial flutter ([address] 12/19/2015    atrial flutter with 2 to1 block at a rate of 135 on EKG       Past Surgical History:   Procedure Laterality Date    CARDIAC SURG PROCEDURE UNLIST      ablation  11/16    HX CATARACT REMOVAL      HX HEMORRHOIDECTOMY      HX HEMORRHOIDECTOMY         Current Outpatient Medications   Medication Sig    rosuvastatin (CRESTOR) 20 mg tablet TAKE ONE TABLET BY MOUTH EVERY NIGHT AT BEDTIME    metoprolol succinate (TOPROL-XL) 50 mg XL tablet TAKE ONE TABLET BY MOUTH DAILY    cyanocobalamin, vitamin B-12, (VITAMIN B-12 PO) Take  by mouth.  fish oil-dha-epa 1,200-144-216 mg cap Take  by mouth.  folic acid/multivit-min/lutein (CENTRUM SILVER PO) Take  by mouth.  montelukast (SINGULAIR) 10 mg tablet Take 10 mg by mouth daily.  loratadine (CLARITIN) 10 mg tablet Take 10 mg by mouth. No current facility-administered medications for this visit. Allergies   Allergen Reactions    Lipitor [Atorvastatin] Myalgia       Social History :  Social History     Tobacco Use    Smoking status: Never Smoker    Smokeless tobacco: Never Used   Substance Use Topics    Alcohol use: Yes        Family History: family history includes Alzheimer in his father; Cancer in his mother; Colon Cancer in his mother; Colon Polyps in his brother and sister; Heart Disease in his brother, father, and mother. Review of Systems:   Constitutional: Negative. Respiratory: Negative. Cardiovascular: Positive for orthopnea. Negative for chest pain, palpitations and leg swelling. Gastrointestinal: Negative. Musculoskeletal: Negative. Neurological: Negative for dizziness. Physical Exam:    The patient is a cooperative, alert, well developed, well nourished 78 y.o.    male who is in no acute distress at the time of the examination. Visit Vitals  /82   Pulse 60   Ht 5' 10\" (1.778 m)   Wt 249 lb (112.9 kg)   SpO2 97%   BMI 35.73 kg/m²       HEENT: Conjuctiva white, mucosa moist, no pallor or cyanosis. NECK: Supple without masses, tenderness or thyromegaly. There was no jugular venous distention. Carotid are full bilaterally without bruits. CHEST: Symmetrical with good excursion. LUNGS: Clear to auscultation in all fields. HEART: Irregularly irregular rhythm with a rapid ventricular response. The apex is not displaced. There were no lifts, thrills or heaves. There is a normal S1 and variable S2 without appreciable murmurs, rubs, clicks, or gallops auscultated. ABDOMEN: Soft without masses, tenderness or organomegaly. EXTREMITIES: Full peripheral pulses with trace peripheral edema. Review of Data: See PMH and Cardiology and Imaging sections for cardiac testing      Results for orders placed or performed in visit on 06/02/20   AMB POC EKG ROUTINE W/ 12 LEADS, INTER & REP     Status: None    Narrative    Normal sinus rhythm with rate 60 and some sinus arrhythmia. Incomplete right bundle branch block. .  Low voltage in the precordial leads. Compared to the EKG of September 17, 2019 there was little interval change. Susannah cMkeon D.O., F.A.C.C. Cardiovascular Specialists  [facility] and [facility] [place]  [facility] [address]. [address]    PLEASE NOTE:  This document has been produced using voice recognition software. Unrecognized errors in transcription may be present.

## 2020-06-02 NOTE — PROGRESS NOTES
Arnel Jane presents today for   Chief Complaint   Patient presents with    Irregular Heart Beat    Shortness of [address] preferred language for health care discussion is english/other. Is someone accompanying this pt? no    Is the patient using any DME equipment during [address]? no    Depression Screening:  3 most recent PHQ Screens 9/17/2019   Little interest or pleasure in doing things Not at all   Feeling down, depressed, irritable, or hopeless Not at all   Total Score PHQ 2 0       Learning Assessment:  Learning Assessment 2/5/2016   PRIMARY LEARNER Patient   PRIMARY LANGUAGE ENGLISH   LEARNER PREFERENCE PRIMARY DEMONSTRATION   ANSWERED BY patient   RELATIONSHIP SELF       Abuse Screening:  No flowsheet data found. Fall Risk  Fall Risk Assessment, last 12 mths 9/17/2019   Able to walk? Yes   Fall in past 12 months? No       Pt currently taking Anticoagulant therapy? no    Coordination of Care:  1. Have you been to the ER, urgent care clinic since your last visit? Hospitalized since your last visit? no    2. Have you seen or consulted any other health care providers outside of the [address] [place] [address] since your last visit? Include any pap smears or colon screening.  no

## 2020-08-18 RX ORDER — ROSUVASTATIN CALCIUM 20 MG/1
TABLET, COATED ORAL
Qty: 90 TAB | Refills: 2 | Status: SHIPPED | OUTPATIENT
Start: 2020-08-18 | End: 2020-12-01 | Stop reason: SDUPTHER

## 2020-08-25 RX ORDER — METOPROLOL SUCCINATE 50 MG/1
TABLET, EXTENDED RELEASE ORAL
Qty: 90 TAB | Refills: 3 | Status: SHIPPED | OUTPATIENT
Start: 2020-08-25 | End: 2020-12-01 | Stop reason: SDUPTHER

## 2020-12-01 ENCOUNTER — OFFICE VISIT (OUTPATIENT)
Dept: CARDIOLOGY CLINIC | Age: 70
End: 2020-12-01
Payer: MEDICARE

## 2020-12-01 VITALS
BODY MASS INDEX: 35.22 KG/M2 | HEIGHT: 70 IN | WEIGHT: 246 LBS | SYSTOLIC BLOOD PRESSURE: 140 MMHG | DIASTOLIC BLOOD PRESSURE: 98 MMHG | OXYGEN SATURATION: 97 % | HEART RATE: 66 BPM

## 2020-12-01 DIAGNOSIS — I48.0 PAF (PAROXYSMAL ATRIAL FIBRILLATION) (HCC): Primary | ICD-10-CM

## 2020-12-01 DIAGNOSIS — E78.00 HYPERCHOLESTEREMIA: ICD-10-CM

## 2020-12-01 DIAGNOSIS — I10 ESSENTIAL HYPERTENSION: ICD-10-CM

## 2020-12-01 DIAGNOSIS — G47.33 OBSTRUCTIVE SLEEP APNEA SYNDROME: ICD-10-CM

## 2020-12-01 PROCEDURE — G8432 DEP SCR NOT DOC, RNG: HCPCS | Performed by: INTERNAL MEDICINE

## 2020-12-01 PROCEDURE — G8427 DOCREV CUR MEDS BY ELIG CLIN: HCPCS | Performed by: INTERNAL MEDICINE

## 2020-12-01 PROCEDURE — G8755 DIAS BP > OR = 90: HCPCS | Performed by: INTERNAL MEDICINE

## 2020-12-01 PROCEDURE — G8417 CALC BMI ABV UP PARAM F/U: HCPCS | Performed by: INTERNAL MEDICINE

## 2020-12-01 PROCEDURE — 99214 OFFICE O/P EST MOD 30 MIN: CPT | Performed by: INTERNAL MEDICINE

## 2020-12-01 PROCEDURE — G8536 NO DOC ELDER MAL SCRN: HCPCS | Performed by: INTERNAL MEDICINE

## 2020-12-01 PROCEDURE — 93000 ELECTROCARDIOGRAM COMPLETE: CPT | Performed by: INTERNAL MEDICINE

## 2020-12-01 PROCEDURE — G8753 SYS BP > OR = 140: HCPCS | Performed by: INTERNAL MEDICINE

## 2020-12-01 RX ORDER — METOPROLOL SUCCINATE 50 MG/1
TABLET, EXTENDED RELEASE ORAL
Qty: 90 TAB | Refills: 3 | Status: SHIPPED | OUTPATIENT
Start: 2020-12-01 | End: 2022-02-14 | Stop reason: SDUPTHER

## 2020-12-01 RX ORDER — ROSUVASTATIN CALCIUM 20 MG/1
TABLET, COATED ORAL
Qty: 90 TAB | Refills: 3 | Status: SHIPPED | OUTPATIENT
Start: 2020-12-01 | End: 2022-02-14 | Stop reason: SDUPTHER

## 2020-12-01 NOTE — PROGRESS NOTES
HPI:   I saw Troy Harrison in my office today in cardiovascular evaluation regarding his paroxysmal atrial fibrillation. Mr. Marcela Harrison is a pleasant 71 year old white male with history of hypertension, hypercholesterolemia and a family history of atrial fibrillation with an older brother having atrial fibrillation and apparently an atrial fibrillation ablation in the past as well. He also gives history of his mother having atrial fibrillation. He himself did not have any significant problems until 12/19/15, when he had some palpitations and was found to be in atrial flutter with 2:1 block and heart rate of 135-140. He was given IV Lopressor and ultimately converted back to sinus rhythm.       He continued to have recurrent problems with paroxysms of atrial fibrillation and ultimately was referred to Dr. Molly Funez and was admitted to [facility] where he had atrial fibrillation pulmonary vein isolation ablation using "[employer]tronic [address] [place] [facility].  A Trans-esophageal echocardiogram was done prior to ablation without left atrial appendage thrombus and this was all completed on November 8, 2016.  He was placed on amiodarone for a few months after the procedure, but then that medication was discontinued. He comes in today relates that he is doing very well. He is remaining quite active and denies any palpitation issues or really any other cardiovascular complaints. Encounter Diagnoses   Name Primary?  PAF (paroxysmal atrial fibrillation) (HCC) remaining in sinus rhythm Yes    Essential hypertension     Hypercholesteremia     Obstructive sleep apnea syndrome on BiPAP        Discussion: This patient appears to be doing about as well as we could expect and I have no recommendations for change at this time. Not having any symptoms to suggest the development of symptomatic obstructive coronary disease and denies that it real palpitations.   I did explain to him that if he should have any exertional chest tightness or any problems with shortness of breath and fatigue could come on over a short period of time he should let us know and we would consider doing a stress or pharmacologic myocardial perfusion study. He is on Crestor 20 mg daily for his cholesterol and tells me that his latest lipid profile was somewhat borderline and he is to have another one completed through Dr. Niki Alas office in the near future so we will see if we can get a copy for our records. He tells me his cholesterols have been in the 190-200 range which I would suspect would correlate with an LDL in the 120 range unless he happens to have a high HDL so we did discuss the possibility of doing a coronary calcium score to get a better idea as to his long-term risk and if it is significantly elevated then we would be much more aggressive in lowering his cholesterol possibly adding Zetia to Crestor to attempt to get his total cholesterol under 150 and his LDL under 70 and preferably under 60. His blood pressure was very mildly elevated at 140/98 today when checked by my staff I checked it again myself and got 130/95, but he tells me at home his blood pressure is usually well controlled in the 120-130/70-80 range and since he is my first patient just took his medications I am not going to make any changes at this time and will simply have him follow-up with my partner Dr. Albert David in several months since I will be retiring in the next month.      PCP: Corrine Plascencia MD       Past Medical History:   Diagnosis Date    Cardiac echocardiogram 01/08/2016    EF 60%. No WMA. Mod LVH. Gr 2 DDfx. Mild WALE.  Cardiac Holter monitor 02/12/2016    Mildly abnormal 48-hr Holter. Normal sinus rhythm w/a few short bursts of atrial fibrillation from 20-40 beats up to 170 bpm.  No symptoms reported.  Cardiac nuclear imaging study, normal 01/08/2016    Low risk. No ischemia or prior infarction. No RWMA. EF 63%.   Neg EKG on pharm stress test.    Cardiac transesophageal echocardiography (SONDRA) 11/08/2016    EF 55%. No WMA. LVH. Mild LAE. No LA appendage thrombus. Mod WALE.  Hypercholesterolemia     Hypertension     Typical atrial flutter ([address] 12/19/2015    atrial flutter with 2 to1 block at a rate of 135 on EKG       Past Surgical History:   Procedure Laterality Date    CARDIAC SURG PROCEDURE UNLIST      ablation  11/16    HX CATARACT REMOVAL      HX HEMORRHOIDECTOMY      HX HEMORRHOIDECTOMY         Current Outpatient Medications   Medication Sig    metoprolol succinate (TOPROL-XL) 50 mg XL tablet TAKE 1 TABLET BY MOUTH DAILY    rosuvastatin (CRESTOR) 20 mg tablet TAKE 1 TABLET BY MOUTH    cyanocobalamin, vitamin B-12, (VITAMIN B-12 PO) Take  by mouth.  fish oil-dha-epa 1,200-144-216 mg cap Take  by mouth.  folic acid/multivit-min/lutein (CENTRUM SILVER PO) Take  by mouth.  montelukast (SINGULAIR) 10 mg tablet Take 10 mg by mouth daily.  loratadine (CLARITIN) 10 mg tablet Take 10 mg by mouth. No current facility-administered medications for this visit. Allergies   Allergen Reactions    Lipitor [Atorvastatin] Myalgia       Social History :  Social History     Tobacco Use    Smoking status: Never Smoker    Smokeless tobacco: Never Used   Substance Use Topics    Alcohol use: Yes        Family History: family history includes Alzheimer in his father; Cancer in his mother; Colon Cancer in his mother; Colon Polyps in his brother and sister; Heart Disease in his brother, father, and mother. Review of Systems:   Constitutional: Negative. Respiratory: Negative. Cardiovascular: Positive for orthopnea. Negative for chest pain, palpitations and leg swelling. Gastrointestinal: Negative. Musculoskeletal: Negative. Neurological: Negative for dizziness. Physical Exam:    The patient is a cooperative, alert, well developed, well nourished 69 y.o.    male who is in no acute distress at the time of the examination. Visit Vitals  BP (!) 140/98   Pulse 66   Ht 5' 10\" (1.778 m)   Wt 111.6 kg (246 lb)   SpO2 97%   BMI 35.30 kg/m²       HEENT: Conjuctiva white, mucosa moist, no pallor or cyanosis. NECK: Supple without masses, tenderness or thyromegaly. There was no jugular venous distention. Carotid are full bilaterally without bruits. CHEST: Symmetrical with good excursion. LUNGS: Clear to auscultation in all fields. HEART: Irregularly irregular rhythm with a rapid ventricular response. The apex is not displaced. There were no lifts, thrills or heaves. There is a normal S1 and variable S2 without appreciable murmurs, rubs, clicks, or gallops auscultated. ABDOMEN: Soft without masses, tenderness or organomegaly. EXTREMITIES: Full peripheral pulses with trace peripheral edema. Review of Data: See PMH and Cardiology and Imaging sections for cardiac testing      Results for orders placed or performed in visit on 12/01/20   AMB POC EKG ROUTINE W/ 12 LEADS, INTER & REP     Status: None    Narrative    Normal sinus rhythm with rate 66 and some sinus arrhythmia. Incomplete right bundle branch block. This EKG is otherwise within normal limits and similar to the EKG of June 2, 2020. Moshe Luna D.O., F.A.C.C. Cardiovascular Specialists  [facility] and Vascular [place]  [facility] [address]. [address]    PLEASE NOTE:  This document has been produced using voice recognition software. Unrecognized errors in transcription may be present.

## 2021-07-19 ENCOUNTER — OFFICE VISIT (OUTPATIENT)
Dept: CARDIOLOGY CLINIC | Age: 71
End: 2021-07-19
Payer: MEDICARE

## 2021-07-19 VITALS
HEIGHT: 70 IN | HEART RATE: 72 BPM | DIASTOLIC BLOOD PRESSURE: 82 MMHG | WEIGHT: 246 LBS | OXYGEN SATURATION: 97 % | SYSTOLIC BLOOD PRESSURE: 124 MMHG | BODY MASS INDEX: 35.22 KG/M2

## 2021-07-19 DIAGNOSIS — I48.0 PAF (PAROXYSMAL ATRIAL FIBRILLATION) (HCC): Primary | ICD-10-CM

## 2021-07-19 DIAGNOSIS — E78.00 HYPERCHOLESTEREMIA: ICD-10-CM

## 2021-07-19 DIAGNOSIS — I10 ESSENTIAL HYPERTENSION: ICD-10-CM

## 2021-07-19 DIAGNOSIS — G47.33 OBSTRUCTIVE SLEEP APNEA SYNDROME: ICD-10-CM

## 2021-07-19 PROCEDURE — G8752 SYS BP LESS 140: HCPCS | Performed by: INTERNAL MEDICINE

## 2021-07-19 PROCEDURE — 1101F PT FALLS ASSESS-DOCD LE1/YR: CPT | Performed by: INTERNAL MEDICINE

## 2021-07-19 PROCEDURE — G8427 DOCREV CUR MEDS BY ELIG CLIN: HCPCS | Performed by: INTERNAL MEDICINE

## 2021-07-19 PROCEDURE — 3017F COLORECTAL CA SCREEN DOC REV: CPT | Performed by: INTERNAL MEDICINE

## 2021-07-19 PROCEDURE — 93000 ELECTROCARDIOGRAM COMPLETE: CPT | Performed by: INTERNAL MEDICINE

## 2021-07-19 PROCEDURE — G8510 SCR DEP NEG, NO PLAN REQD: HCPCS | Performed by: INTERNAL MEDICINE

## 2021-07-19 PROCEDURE — G8536 NO DOC ELDER MAL SCRN: HCPCS | Performed by: INTERNAL MEDICINE

## 2021-07-19 PROCEDURE — G8417 CALC BMI ABV UP PARAM F/U: HCPCS | Performed by: INTERNAL MEDICINE

## 2021-07-19 PROCEDURE — 99214 OFFICE O/P EST MOD 30 MIN: CPT | Performed by: INTERNAL MEDICINE

## 2021-07-19 PROCEDURE — G8754 DIAS BP LESS 90: HCPCS | Performed by: INTERNAL MEDICINE

## 2021-07-19 RX ORDER — AZELASTINE 1 MG/ML
1 SPRAY, METERED NASAL AS NEEDED
COMMUNITY
End: 2022-08-14

## 2021-07-19 RX ORDER — FLUTICASONE PROPIONATE 50 MCG
2 SPRAY, SUSPENSION (ML) NASAL
COMMUNITY
Start: 2021-07-18 | End: 2022-08-14

## 2021-07-19 NOTE — PROGRESS NOTES
Ariana Kraft presents today for No chief complaint on file. Ariana Kraft preferred language for health care discussion is english/other. Is someone accompanying this pt? no    Is the patient using any DME equipment during [address]? no    Depression Screening:  3 most recent PHQ Screens 7/19/2021   Little interest or pleasure in doing things Not at all   Feeling down, depressed, irritable, or hopeless Not at all   Total Score PHQ 2 0       Learning Assessment:  Learning Assessment 7/19/2021   PRIMARY LEARNER Patient   PRIMARY LANGUAGE ENGLISH   LEARNER PREFERENCE PRIMARY DEMONSTRATION   ANSWERED BY patient   RELATIONSHIP SELF       Abuse Screening:  Abuse Screening Questionnaire 7/19/2021   Do you ever feel afraid of your partner? N   Are you in a relationship with someone who physically or mentally threatens you? N   Is it safe for you to go home? Y       Fall Risk  Fall Risk Assessment, last 12 mths 7/19/2021   Able to walk? Yes   Fall in past 12 months? 0   Do you feel unsteady? 0   Are you worried about falling 0           Pt currently taking Anticoagulant therapy? no    Pt currently taking Antiplatelet therapy ? no      Coordination of Care:  1. Have you been to the ER, urgent care clinic since your last visit? Hospitalized since your last visit? no    2. Have you seen or consulted any other health care providers outside of the [address] [place] [address] since your last visit? Include any pap smears or colon screening.  no

## 2021-07-25 NOTE — PROGRESS NOTES
Carla Sigala is in the office today for cardiovascular evaluation of his paroxysmal atrial fibrillation. He has a history of hypertension, hypercholesterolemia and a family history of atrial fibrillation with an older brother having an atrial fibrillation ablation. His mother also had atrial fibrillation. In December 2015  he  was found to be in atrial flutter with 2:1 block and heart rate of 135-140. He was given IV Lopressor and ultimately converted back to sinus rhythm.       He continued to have recurrent problems with paroxysms of atrial fibrillation and ultimately had pulmonary vein isolation and ablation using [employer] in November 2016. [facility] was initially on amiodarone for a few months which was later discontinued. In the office today, reports he is doing well. He exercises without much limitation. He walks the neighborhood on a regular basis. He has no specific complaints in the office today. Encounter Diagnoses   Name Primary?  PAF (paroxysmal atrial fibrillation) ([address] Yes    Essential hypertension     Hypercholesteremia     Obstructive sleep apnea syndrome      Plan; Carla Sigala seems to be stable from a cardiac standpoint. He has had no prolonged palpitations, dizziness, near syncope or syncope. He is still exercising regularly. He is on metoprolol succinate 50 mg daily. He continues on Crestor 20 mg daily. His last lipid profile was done on 6/18/2021. Total cholesterol was 143. Triglycerides 137. HDL 45. LDL 71. His blood pressure in the office today is 124/82. We will plan to see him back in 6 months.     PCP: Pavan Lagos MD       Past Medical History:   Diagnosis Date    Cardiac echocardiogram 01/08/2016    EF 60%. No WMA. Mod LVH. Gr 2 DDfx. Mild WALE.  Cardiac Holter monitor 02/12/2016    Mildly abnormal 48-hr Holter. Normal sinus rhythm w/a few short bursts of atrial fibrillation from 20-40 beats up to 170 bpm.  No symptoms reported.     Cardiac nuclear imaging study, normal 01/08/2016    Low risk. No ischemia or prior infarction. No RWMA. EF 63%. Neg EKG on pharm stress test.    Cardiac transesophageal echocardiography (SONDRA) 11/08/2016    EF 55%. No WMA. LVH. Mild LAE. No LA appendage thrombus. Mod WALE.  Hypercholesterolemia     Hypertension     Typical atrial flutter ([address] 12/19/2015    atrial flutter with 2 to1 block at a rate of 135 on EKG       Past Surgical History:   Procedure Laterality Date    HX CATARACT REMOVAL      HX HEMORRHOIDECTOMY      HX HEMORRHOIDECTOMY      [place] CARDIAC SURG PROCEDURE UNLIST      ablation  11/16       Current Outpatient Medications   Medication Sig    fluticasone propionate (FLONASE) 50 mcg/actuation nasal spray     ferrous fumarate/vit Bcomp,C (SUPER B COMPLEX PO) Take  by mouth.  psyllium seed, with dextrose, (FIBER PO) Take  by mouth.  azelastine (ASTELIN) 137 mcg (0.1 %) nasal spray 1 Spray as needed for Rhinitis. Use in each nostril as directed    metoprolol succinate (TOPROL-XL) 50 mg XL tablet TAKE 1 TABLET BY MOUTH DAILY    rosuvastatin (CRESTOR) 20 mg tablet TAKE 1 TABLET BY MOUTH    fish oil-dha-epa 1,200-144-216 mg cap Take  by mouth.  folic acid/multivit-min/lutein (CENTRUM SILVER PO) Take  by mouth.  montelukast (SINGULAIR) 10 mg tablet Take 10 mg by mouth daily.  loratadine (CLARITIN) 10 mg tablet Take 10 mg by mouth. No current facility-administered medications for this visit. Allergies   Allergen Reactions    Lipitor [Atorvastatin] Myalgia       Social History :  Social History     Tobacco Use    Smoking status: Never Smoker    Smokeless tobacco: Never Used   Substance Use Topics    Alcohol use: Yes        Family History: family history includes Alzheimer in his father; Cancer in his mother; Colon Cancer in his mother; Colon Polyps in his brother and sister; Heart Disease in his brother, father, and mother.       Review of Systems: Constitutional: Negative. Respiratory: Negative. Cardiovascular: . Negative for chest pain, palpitations and leg swelling. Gastrointestinal: Negative. Musculoskeletal: Negative. Neurological: Negative for dizziness. Physical Exam:    The patient is a cooperative and alert  Visit Vitals  /82 (BP 1 Location: Right arm, BP Patient Position: Sitting, BP Cuff Size: Small adult)   Pulse 72   Ht 5' 10\" (1.778 m)   Wt 111.6 kg (246 lb)   SpO2 97%   BMI 35.30 kg/m²       HEENT: Conjuctiva white and mucosa moist.  NECK: Supple without masses, tenderness or thyromegaly. There was no jugular venous distention. Carotids are without bruits. CHEST: Symmetrical with good excursion. LUNGS: Clear to auscultation in all fields. HEART: Regular rate and rhythm. There is a normal S1 and variable S2 without appreciable murmurs, rubs, clicks, or gallops auscultated. ABDOMEN: Soft without masses, tenderness or organomegaly. EXTREMITIES: Full peripheral pulses with trace peripheral edema. Review of Data: See PMH and Cardiology and Imaging sections for cardiac testing      Results for orders placed or performed in visit on 07/19/2021. AMB POC EKG ROUTINE W/ 12 LEADS, INTER & REP     Status: None    Narrative    Normal sinus rhythm with rate of 72. Incomplete right bundle branch block. Jameson Mandujano MD, F.A.C.C. [facility]  [facility] [place]  [facility] [address] [place]    PLEASE NOTE:  This document has been produced using voice recognition software. Unrecognized errors in transcription may be present.

## 2022-02-01 ENCOUNTER — OFFICE VISIT (OUTPATIENT)
Dept: CARDIOLOGY CLINIC | Age: 72
End: 2022-02-01
Payer: MEDICARE

## 2022-02-01 VITALS
HEART RATE: 63 BPM | DIASTOLIC BLOOD PRESSURE: 88 MMHG | WEIGHT: 247 LBS | BODY MASS INDEX: 35.36 KG/M2 | HEIGHT: 70 IN | SYSTOLIC BLOOD PRESSURE: 122 MMHG | OXYGEN SATURATION: 97 %

## 2022-02-01 DIAGNOSIS — I10 PRIMARY HYPERTENSION: Primary | ICD-10-CM

## 2022-02-01 PROCEDURE — 3017F COLORECTAL CA SCREEN DOC REV: CPT | Performed by: INTERNAL MEDICINE

## 2022-02-01 PROCEDURE — G8417 CALC BMI ABV UP PARAM F/U: HCPCS | Performed by: INTERNAL MEDICINE

## 2022-02-01 PROCEDURE — 99214 OFFICE O/P EST MOD 30 MIN: CPT | Performed by: INTERNAL MEDICINE

## 2022-02-01 PROCEDURE — G8427 DOCREV CUR MEDS BY ELIG CLIN: HCPCS | Performed by: INTERNAL MEDICINE

## 2022-02-01 PROCEDURE — G8536 NO DOC ELDER MAL SCRN: HCPCS | Performed by: INTERNAL MEDICINE

## 2022-02-01 PROCEDURE — G8510 SCR DEP NEG, NO PLAN REQD: HCPCS | Performed by: INTERNAL MEDICINE

## 2022-02-01 PROCEDURE — G8752 SYS BP LESS 140: HCPCS | Performed by: INTERNAL MEDICINE

## 2022-02-01 PROCEDURE — G8754 DIAS BP LESS 90: HCPCS | Performed by: INTERNAL MEDICINE

## 2022-02-01 PROCEDURE — 1101F PT FALLS ASSESS-DOCD LE1/YR: CPT | Performed by: INTERNAL MEDICINE

## 2022-02-01 NOTE — PROGRESS NOTES
Star Shoemaker presents today for   Chief Complaint   Patient presents with    Follow-up     6 month       Star Shoemaker preferred language for health care discussion is english/other. Is someone accompanying this pt? no    Is the patient using any DME equipment during [address]? no    Depression Screening:  3 most recent PHQ Screens 2/1/2022   Little interest or pleasure in doing things Not at all   Feeling down, depressed, irritable, or hopeless Not at all   Total Score PHQ 2 0       Learning Assessment:  Learning Assessment 2/1/2022   PRIMARY LEARNER Patient   PRIMARY LANGUAGE ENGLISH   LEARNER PREFERENCE PRIMARY DEMONSTRATION   ANSWERED BY patient   RELATIONSHIP SELF       Abuse Screening:  Abuse Screening Questionnaire 2/1/2022   Do you ever feel afraid of your partner? N   Are you in a relationship with someone who physically or mentally threatens you? N   Is it safe for you to go home? Y       Fall Risk  Fall Risk Assessment, last 12 mths 2/1/2022   Able to walk? Yes   Fall in past 12 months? 0   Do you feel unsteady? 0   Are you worried about falling 0           Pt currently taking Anticoagulant therapy? no    Pt currently taking Antiplatelet therapy ? no      Coordination of Care:  1. Have you been to the ER, urgent care clinic since your last visit? Hospitalized since your last visit? no    2. Have you seen or consulted any other health care providers outside of the [address] [place] [address] since your last visit? Include any pap smears or colon screening.  no

## 2022-02-08 NOTE — PROGRESS NOTES
Uma Stahl is in the office today for cardiovascular evaluation of his paroxysmal atrial fibrillation. He has a history of hypertension, hypercholesterolemia and a family history of atrial fibrillation with an older brother having had an atrial fibrillation ablation. His mother also had atrial fibrillation. In December 2015  he  was found to be in atrial flutter with 2:1 block with a heart rate of 135-140. He was given IV Lopressor and ultimately converted  to sinus rhythm.       He continued to have recurrent problems with paroxysms of atrial fibrillation and ultimately had pulmonary vein isolation and ablation using [employer] in November 2016. [facility] was initially on amiodarone for a few months which was later discontinued. In the office today, he reports that he has not been exercising \"as much as I should \". He was going to the gymnasium regularly. He has had no shortness of breath, PND or orthopnea. He wears BiPAP at night and feels significantly better. He has had no recent palpitations, dizziness, near syncope or syncope. Plan; Uma Stahl continues to be  stable from a cardiac standpoint. He has had no prolonged palpitations, dizziness, near syncope or syncope. He is still exercising regularly although less than in the past.  He is on metoprolol succinate 50 mg daily. He continues on Crestor 20 mg daily. His last lipid profile was done on 6/18/2021. Total cholesterol was 143. Triglycerides 137. HDL 45. LDL 71. His blood pressure in the office today is 122/88. We will plan to see him back in 6 months.     PCP: Carlin Odell MD       Past Medical History:   Diagnosis Date    Cardiac echocardiogram 01/08/2016    EF 60%. No WMA. Mod LVH. Gr 2 DDfx. Mild WALE.  Cardiac Holter monitor 02/12/2016    Mildly abnormal 48-hr Holter. Normal sinus rhythm w/a few short bursts of atrial fibrillation from 20-40 beats up to 170 bpm.  No symptoms reported.     Cardiac nuclear imaging study, normal 01/08/2016    Low risk. No ischemia or prior infarction. No RWMA. EF 63%. Neg EKG on pharm stress test.    Cardiac transesophageal echocardiography (SONDRA) 11/08/2016    EF 55%. No WMA. LVH. Mild LAE. No LA appendage thrombus. Mod WALE.  Hypercholesterolemia     Hypertension     Typical atrial flutter ([address] 12/19/2015    atrial flutter with 2 to1 block at a rate of 135 on EKG       Past Surgical History:   Procedure Laterality Date    HX CATARACT REMOVAL      HX HEMORRHOIDECTOMY      HX HEMORRHOIDECTOMY      [place] CARDIAC SURG PROCEDURE UNLIST      ablation  11/16       Current Outpatient Medications   Medication Sig    fluticasone propionate (FLONASE) 50 mcg/actuation nasal spray     ferrous fumarate/vit Bcomp,C (SUPER B COMPLEX PO) Take  by mouth.  psyllium seed, with dextrose, (FIBER PO) Take  by mouth.  azelastine (ASTELIN) 137 mcg (0.1 %) nasal spray 1 Spray as needed for Rhinitis. Use in each nostril as directed    metoprolol succinate (TOPROL-XL) 50 mg XL tablet TAKE 1 TABLET BY MOUTH DAILY    rosuvastatin (CRESTOR) 20 mg tablet TAKE 1 TABLET BY MOUTH    fish oil-dha-epa 1,200-144-216 mg cap Take  by mouth.  folic acid/multivit-min/lutein (CENTRUM SILVER PO) Take  by mouth.  montelukast (SINGULAIR) 10 mg tablet Take 10 mg by mouth daily.  loratadine (CLARITIN) 10 mg tablet Take 10 mg by mouth. No current facility-administered medications for this visit. Allergies   Allergen Reactions    Lipitor [Atorvastatin] Myalgia       Social History :  Social History     Tobacco Use    Smoking status: Never Smoker    Smokeless tobacco: Never Used   Substance Use Topics    Alcohol use: Yes        Family History: family history includes Alzheimer's Disease in his father; Cancer in his mother; Colon Cancer in his mother; Colon Polyps in his brother and sister; Heart Disease in his brother, father, and mother.       Review of Systems:   Constitutional: Negative. Respiratory: Negative. Cardiovascular: . Negative for chest pain, palpitations and leg swelling. Gastrointestinal: Negative. Musculoskeletal: Negative. Neurological: Negative for dizziness. Physical Exam:    The patient is a cooperative and alert  Visit Vitals  /88 (BP 1 Location: Left upper arm, BP Patient Position: Sitting, BP Cuff Size: Adult)   Pulse 63   Ht 5' 10\" (1.778 m)   Wt 112 kg (247 lb)   SpO2 97%   BMI 35.44 kg/m²       HEENT: Conjuctiva white and mucosa moist.  NECK: Supple without masses, tenderness or thyromegaly. There was no jugular venous distention. Carotids are without bruits. CHEST: Symmetrical with good excursion. LUNGS: Clear to auscultation in all fields. HEART: Regular rate and rhythm. There is a normal S1 and variable S2 without appreciable murmurs, rubs, clicks, or gallops auscultated. ABDOMEN: Soft without masses, tenderness or organomegaly. EXTREMITIES: Full peripheral pulses with trace peripheral edema. Review of Data: See PMH and Cardiology and Imaging sections for cardiac testing      Results for orders placed or performed in visit on 07/19/2021. AMB POC EKG ROUTINE W/ 12 LEADS, INTER & REP     Status: None    Narrative    Normal sinus rhythm with rate of 72. Incomplete right bundle branch block. Chay Frazier MD, F.A.C.C. [facility] Specialists  [facility] [place]  [address] [place] [address]. [address] [place]    PLEASE NOTE:  This document has been produced using voice recognition software. Unrecognized errors in transcription may be present.

## 2022-02-14 RX ORDER — ROSUVASTATIN CALCIUM 20 MG/1
TABLET, COATED ORAL
Qty: 90 TABLET | Refills: 3 | Status: SHIPPED | OUTPATIENT
Start: 2022-02-14

## 2022-02-14 RX ORDER — METOPROLOL SUCCINATE 50 MG/1
TABLET, EXTENDED RELEASE ORAL
Qty: 90 TABLET | Refills: 3 | Status: SHIPPED | OUTPATIENT
Start: 2022-02-14

## 2022-03-19 PROBLEM — K57.92 DIVERTICULITIS: Status: ACTIVE | Noted: 2018-04-16

## 2022-03-19 PROBLEM — E66.01 SEVERE OBESITY (HCC): Status: ACTIVE | Noted: 2019-03-19

## 2022-03-19 PROBLEM — K63.89 OTHER SPECIFIED DISEASES OF INTESTINE: Status: ACTIVE | Noted: 2018-04-16

## 2022-04-22 ENCOUNTER — OFFICE VISIT (OUTPATIENT)
Dept: ORTHOPEDIC SURGERY | Age: 72
End: 2022-04-22
Payer: MEDICARE

## 2022-04-22 VITALS
WEIGHT: 243 LBS | HEART RATE: 73 BPM | BODY MASS INDEX: 34.79 KG/M2 | OXYGEN SATURATION: 95 % | TEMPERATURE: 97.3 F | HEIGHT: 70 IN

## 2022-04-22 DIAGNOSIS — M25.461 EFFUSION OF RIGHT KNEE: ICD-10-CM

## 2022-04-22 DIAGNOSIS — M25.561 ACUTE PAIN OF RIGHT KNEE: ICD-10-CM

## 2022-04-22 DIAGNOSIS — S39.012A STRAIN OF LUMBAR REGION, INITIAL ENCOUNTER: ICD-10-CM

## 2022-04-22 DIAGNOSIS — M17.11 PRIMARY OSTEOARTHRITIS OF RIGHT KNEE: ICD-10-CM

## 2022-04-22 DIAGNOSIS — M54.50 LUMBAR PAIN: Primary | ICD-10-CM

## 2022-04-22 PROCEDURE — G8427 DOCREV CUR MEDS BY ELIG CLIN: HCPCS | Performed by: SPECIALIST

## 2022-04-22 PROCEDURE — 99203 OFFICE O/P NEW LOW 30 MIN: CPT | Performed by: SPECIALIST

## 2022-04-22 PROCEDURE — G8536 NO DOC ELDER MAL SCRN: HCPCS | Performed by: SPECIALIST

## 2022-04-22 PROCEDURE — G8756 NO BP MEASURE DOC: HCPCS | Performed by: SPECIALIST

## 2022-04-22 PROCEDURE — 20610 DRAIN/INJ JOINT/BURSA W/O US: CPT | Performed by: SPECIALIST

## 2022-04-22 PROCEDURE — 1101F PT FALLS ASSESS-DOCD LE1/YR: CPT | Performed by: SPECIALIST

## 2022-04-22 PROCEDURE — 3017F COLORECTAL CA SCREEN DOC REV: CPT | Performed by: SPECIALIST

## 2022-04-22 PROCEDURE — G8417 CALC BMI ABV UP PARAM F/U: HCPCS | Performed by: SPECIALIST

## 2022-04-22 PROCEDURE — G8432 DEP SCR NOT DOC, RNG: HCPCS | Performed by: SPECIALIST

## 2022-04-22 RX ORDER — BETAMETHASONE SODIUM PHOSPHATE AND BETAMETHASONE ACETATE 3; 3 MG/ML; MG/ML
3 INJECTION, SUSPENSION INTRA-ARTICULAR; INTRALESIONAL; INTRAMUSCULAR; SOFT TISSUE ONCE
Status: COMPLETED | OUTPATIENT
Start: 2022-04-22 | End: 2022-04-22

## 2022-04-22 RX ADMIN — BETAMETHASONE SODIUM PHOSPHATE AND BETAMETHASONE ACETATE 3 MG: 3; 3 INJECTION, SUSPENSION INTRA-ARTICULAR; INTRALESIONAL; INTRAMUSCULAR; SOFT TISSUE at 09:15

## 2022-04-29 ENCOUNTER — OFFICE VISIT (OUTPATIENT)
Dept: ORTHOPEDIC SURGERY | Age: 72
End: 2022-04-29
Payer: MEDICARE

## 2022-04-29 VITALS
BODY MASS INDEX: 35.63 KG/M2 | HEIGHT: 69 IN | WEIGHT: 240.6 LBS | OXYGEN SATURATION: 97 % | TEMPERATURE: 97.2 F | HEART RATE: 77 BPM

## 2022-04-29 DIAGNOSIS — M54.50 LUMBAR PAIN: Primary | ICD-10-CM

## 2022-04-29 DIAGNOSIS — M47.816 ARTHRITIS OF LUMBAR SPINE: ICD-10-CM

## 2022-04-29 DIAGNOSIS — S39.012D STRAIN OF LUMBAR REGION, SUBSEQUENT ENCOUNTER: ICD-10-CM

## 2022-04-29 DIAGNOSIS — M17.11 PRIMARY OSTEOARTHRITIS OF RIGHT KNEE: ICD-10-CM

## 2022-04-29 PROCEDURE — G8536 NO DOC ELDER MAL SCRN: HCPCS | Performed by: SPECIALIST

## 2022-04-29 PROCEDURE — 99213 OFFICE O/P EST LOW 20 MIN: CPT | Performed by: SPECIALIST

## 2022-04-29 PROCEDURE — G8417 CALC BMI ABV UP PARAM F/U: HCPCS | Performed by: SPECIALIST

## 2022-04-29 PROCEDURE — 20552 NJX 1/MLT TRIGGER POINT 1/2: CPT | Performed by: SPECIALIST

## 2022-04-29 PROCEDURE — 1101F PT FALLS ASSESS-DOCD LE1/YR: CPT | Performed by: SPECIALIST

## 2022-04-29 PROCEDURE — G8432 DEP SCR NOT DOC, RNG: HCPCS | Performed by: SPECIALIST

## 2022-04-29 PROCEDURE — G8756 NO BP MEASURE DOC: HCPCS | Performed by: SPECIALIST

## 2022-04-29 PROCEDURE — G8427 DOCREV CUR MEDS BY ELIG CLIN: HCPCS | Performed by: SPECIALIST

## 2022-04-29 PROCEDURE — 3017F COLORECTAL CA SCREEN DOC REV: CPT | Performed by: SPECIALIST

## 2022-04-29 RX ORDER — BETAMETHASONE SODIUM PHOSPHATE AND BETAMETHASONE ACETATE 3; 3 MG/ML; MG/ML
3 INJECTION, SUSPENSION INTRA-ARTICULAR; INTRALESIONAL; INTRAMUSCULAR; SOFT TISSUE ONCE
Status: COMPLETED | OUTPATIENT
Start: 2022-04-29 | End: 2022-04-29

## 2022-04-29 RX ADMIN — BETAMETHASONE SODIUM PHOSPHATE AND BETAMETHASONE ACETATE 3 MG: 3; 3 INJECTION, SUSPENSION INTRA-ARTICULAR; INTRALESIONAL; INTRAMUSCULAR; SOFT TISSUE at 11:57

## 2022-04-29 NOTE — PROGRESS NOTES
Patient: Maira Vazquez                MRN: [id number]       SSN: [id number]  YOB: 1950        AGE: 66 y.o. SEX: male    PCP: Kimberlyn Cordero MD  04/29/22    CC: RIGHT KNEE AND BACK PAIN    HISTORY:  Maira Vazquez is a 66 y.o. male who is seen for back pain and stiffness. He tweaked his back twice while vacuuming in the past several months. He also tweaked his back while he leaned over in the shower in [facility], last weekend. His pains radiate down his legs. He responded to an injection last week. He will have postpone his upcoming trip to [facility] due to his severe acute back pain flareup. He is also seen for right knee pain. He has been experiencing right knee pain for the past week. He noticed posterior knee pain when he lost his balance on a recent flight. He had to have a wheelchair to get off the plane. He was seen at [facility] on 4/18/22 where right knee x-rays revealed OA. He was prescribed cyclobenzaprine HCL and a steroid taper pack. He feels posterior knee pain with standing, walking and stair climbing. He also notes right foot soreness and stiffness. He has to hold onto a cart at the grocery store to walk. Pain Assessment  4/29/2022   Location of Pain Knee;Back   Location Modifiers Right   Severity of Pain 5   Quality of Pain Throbbing   Quality of Pain Comment -   Duration of Pain Persistent   Frequency of Pain Constant   Aggravating Factors Bending;Standing;Walking   Limiting Behavior -   Relieving Factors NSAID   Relieving Factors Comment -   Result of Injury -   Work-Related Injury -   Type of Injury -     Occupation, etc:  Mr. Em Quick is retired. He worked as a commercial bank lender and senior  for [employer]  He is going to [place]; he is visiting [place] and going on a cruise [place] [facility]. He wants to cancel his vacation due to his pains. He has 2 daughters -- one in [place] and one in [facility].  He has a grandson and granddaughter. Mr. Natividad Washington weighs 243 lbs and is 5'10\" tall. He has a h/o Afib. He used to take Eliquis. No results found for: HBA1C, [id number], BTF3ARSU, JYU7FEKI  Weight Metrics 4/29/2022 4/22/2022 2/1/2022 1/12/2022 7/19/2021 12/1/2020 7/15/2020   Weight 240 lb 9.6 oz 243 lb 247 lb 246 lb 246 lb 246 lb 244 lb   BMI 35.53 kg/m2 34.87 kg/m2 35.44 kg/m2 35.3 kg/m2 35.3 kg/m2 35.3 kg/m2 35.01 kg/m2       Patient Active Problem List   Diagnosis Code    Anal fissure K60.2    HTN (hypertension) I10    Hypercholesteremia E78.00    S/P ablation of atrial fibrillation Z98.890, Z86.79    Other specified diseases of intestine K63.89    Diverticulitis K57.92    Severe obesity ([address] E66.01     REVIEW OF SYSTEMS:    Constitutional Symptoms: Negative   Eyes: Negative   Ears, Nose, Throat and Mouth: Negative   Cardiovascular: Negative   Respiratory: Negative   Genitourinary: Per HPI   Gastrointestinal: Per HPI   Integumentary (Skin and/or Breast): Negative   Musculoskeletal: Per HPI   Endocrine/Rheumatologic: Negative   Neurological: Per HPI   Hematology/Lymphatic: Negative    Allergic/Immunologic: Negative   Phychiatric: Negative    Social History     Socioeconomic History    Marital status:      Spouse name: Not on file    Number of children: Not on file    Years of education: Not on file    Highest education level: Not on file   Occupational History    Not on file   Tobacco Use    Smoking status: Never Smoker    Smokeless tobacco: Never Used   Substance and Sexual Activity    Alcohol use:  Yes    Drug use: No    Sexual activity: Not on file   Other Topics Concern    Not on file   Social History Narrative    Not on file     Social Determinants of Health     Financial Resource Strain:     Difficulty of Paying Living Expenses: Not on file   Food Insecurity:     Worried About Running Out of Food in the Last Year: Not on file    Devyn of Food in the Last Year: Not on file Transportation Needs:     Lack of Transportation (Medical): Not on file    Lack of Transportation (Non-Medical): Not on file   Physical Activity:     Days of Exercise per Week: Not on file    Minutes of Exercise per Session: Not on file   Stress:     Feeling of Stress : Not on file   Social Connections:     Frequency of Communication with Friends and Family: Not on file    Frequency of Social Gatherings with Friends and Family: Not on file    Attends Rastafarian Services: Not on file    Active Member of [address] [place] [address] [employer] or Organizations: Not on file    Attends Club or Organization Meetings: Not on file    Marital Status: Not on file   Intimate Partner Violence:     Fear of Current or Ex-Partner: Not on file    Emotionally Abused: Not on file    Physically Abused: Not on file    Sexually Abused: Not on file   Housing Stability:     Unable to Pay for Housing in the Last Year: Not on file    Number of [place] in the Last Year: Not on file    Unstable Housing in the Last Year: Not on file      Allergies   Allergen Reactions    Lipitor [Atorvastatin] Myalgia      Current Outpatient Medications   Medication Sig    rosuvastatin (CRESTOR) 20 mg tablet TAKE 1 TABLET BY MOUTH    metoprolol succinate (TOPROL-XL) 50 mg XL tablet TAKE 1 TABLET BY MOUTH DAILY    fluticasone propionate (FLONASE) 50 mcg/actuation nasal spray     ferrous fumarate/vit Bcomp,C (SUPER B COMPLEX PO) Take  by mouth.  psyllium seed, with dextrose, (FIBER PO) Take  by mouth. (Patient not taking: Reported on 4/22/2022)    azelastine (ASTELIN) 137 mcg (0.1 %) nasal spray 1 Spray as needed for Rhinitis. Use in each nostril as directed    fish oil-dha-epa 1,200-144-216 mg cap Take  by mouth.  folic acid/multivit-min/lutein (CENTRUM SILVER PO) Take  by mouth.  montelukast (SINGULAIR) 10 mg tablet Take 10 mg by mouth daily.  loratadine (CLARITIN) 10 mg tablet Take 10 mg by mouth.      No current facility-administered medications for this visit. PHYSICAL EXAMINATION:  Visit Vitals  Pulse 77   Temp 97.2 °F (36.2 °C) (Temporal)   Ht 5' 9\" (1.753 m)   Wt 240 lb 9.6 oz (109.1 kg)   SpO2 97%   BMI 35.53 kg/m²      ORTHO EXAMINATION:  Examination Right knee Left knee   Skin Intact Intact   Range of motion 100-0 120-0   Effusion +++ -   Medial joint line tenderness - -   Lateral joint line tenderness - -   Popliteal tenderness - -   Osteophytes palpable + +   Dalias - -   Patella crepitus + +   Anterior drawer - -   Lateral laxity - -   Medial laxity - -   Varus deformity - -   Valgus deformity - -   Pretibial edema - -   Calf tenderness - -   Posterior tenderness     Examination Lumbar Thoracic   Skin Intact Intact   Tenderness + paralumbar -   Tightness + paralumbar -   Lordosis Normal N/A   Kyphosis N/A Normal   Scoliosis - -   Flexion Fingertips to mid shin N/A   Extension 10 N/A   Knee reflexes Normal N/A   Ankle reflexes Normal N/A   Straight leg raise - N/A   Calf tenderness - N/A        TIME OUT:  Chart reviewed for the following:   I, José Luis Meraz MD, have reviewed the History, Physical and updated the Allergic reactions for [address] performed immediately prior to start of procedure:  Evert Hardin MD, have performed the following reviews on Nathan Plascencia prior to the start of the procedure:          * Patient was identified by name and date of birth   * Agreement on procedure being performed was verified  * Risks and Benefits explained to the patient  * Procedure site verified and marked as necessary  * Patient was positioned for comfort  * Consent was obtained     Time: 11:50 AM     Date of procedure: 4/29/2022  Procedure performed by:  José Luis Meraz MD  Mr. Melody Hurtado tolerated the procedure well with no complications. RADIOGRAPHS:  XR LUMB SPINE 10/13/21 ALAN BO  IMPRESSION:  Degenerative changes lumbar spine.     XR RIGHT KNEE PATIENT FIRST 4/18/22   Impression: mild to moderate DJD of medial compartment    IMPRESSION:      ICD-10-CM ICD-9-CM    1. Lumbar pain  M54.50 724.2 betamethasone (CELESTONE) injection 3 mg      INJECT TRIGGER POINT, 1 OR 2      REFERRAL TO SPINE SURGERY   2. Strain of lumbar region, subsequent encounter  S39.012D V58.89 betamethasone (CELESTONE) injection 3 mg     847.2 INJECT TRIGGER POINT, 1 OR 2      REFERRAL TO SPINE SURGERY   3. Arthritis of lumbar spine  M47.816 721.3 betamethasone (CELESTONE) injection 3 mg      INJECT TRIGGER POINT, 1 OR 2      REFERRAL TO SPINE SURGERY   4. Primary osteoarthritis of right knee  M17.11 715.16      PLAN:  Note provided -- postpone/cancel upcoming trip due to orthpedic condition. He will start PT at the [facility]. After discussing treatment options, patient's paralumbar region was injected with 4 cc Marcaine and 1/2 cc Celestone. There is no need for surgery at this time. He will follow up at the spine center.       Scribed by Deshaun Hardwick MD [facility]) as dictated by Deshaun Hardwick MD

## 2022-04-29 NOTE — LETTER
4/29/2022 11:49 AM    Mr. Marisol Galloway  [address] [place] [id number]      To Whom It May Concern:    Marisol Galloway is currently under the care of [address] [place] [address]. Due to an acute Orthopaedic problem--He is unable to travel at this time. If there are questions or concerns please have the patient contact our office.         Sincerely,        Jos Hays MD

## 2022-05-02 ENCOUNTER — HOSPITAL ENCOUNTER (OUTPATIENT)
Dept: PHYSICAL THERAPY | Age: 72
Discharge: HOME | End: 2022-05-02
Attending: SPECIALIST
Payer: MEDICARE

## 2022-05-02 PROCEDURE — 97162 PT EVAL MOD COMPLEX 30 MIN: CPT

## 2022-05-02 PROCEDURE — 97530 THERAPEUTIC ACTIVITIES: CPT

## 2022-05-05 ENCOUNTER — DOCUMENTATION ONLY (OUTPATIENT)
Dept: ORTHOPEDIC SURGERY | Age: 72
End: 2022-05-05

## 2022-05-05 ENCOUNTER — HOSPITAL ENCOUNTER (OUTPATIENT)
Dept: PHYSICAL THERAPY | Age: 72
Discharge: HOME | End: 2022-05-05
Attending: SPECIALIST
Payer: MEDICARE

## 2022-05-05 PROCEDURE — 97113 AQUATIC THERAPY/EXERCISES: CPT

## 2022-05-05 NOTE — PROGRESS NOTES
Patient dropped off travel insured form at [facility] office to be completed pt can be reached at [phone] once completed

## 2022-05-10 ENCOUNTER — HOSPITAL ENCOUNTER (OUTPATIENT)
Dept: PHYSICAL THERAPY | Age: 72
Discharge: HOME | End: 2022-05-10
Attending: SPECIALIST
Payer: MEDICARE

## 2022-05-10 PROCEDURE — 97113 AQUATIC THERAPY/EXERCISES: CPT

## 2022-05-11 ENCOUNTER — DOCUMENTATION ONLY (OUTPATIENT)
Dept: ORTHOPEDIC SURGERY | Age: 72
End: 2022-05-11

## 2022-05-11 NOTE — PROGRESS NOTES
Patient forms  Completed and placed and form bins at [facility]. Patient aware, copy to be scanned into chart.

## 2022-05-17 ENCOUNTER — APPOINTMENT (OUTPATIENT)
Dept: PHYSICAL THERAPY | Age: 72
End: 2022-05-17
Attending: SPECIALIST
Payer: MEDICARE

## 2022-05-24 ENCOUNTER — HOSPITAL ENCOUNTER (OUTPATIENT)
Dept: PHYSICAL THERAPY | Age: 72
Discharge: HOME | End: 2022-05-24
Attending: SPECIALIST
Payer: MEDICARE

## 2022-05-24 PROCEDURE — 97113 AQUATIC THERAPY/EXERCISES: CPT

## 2022-05-31 ENCOUNTER — HOSPITAL ENCOUNTER (OUTPATIENT)
Dept: PHYSICAL THERAPY | Age: 72
Discharge: HOME | End: 2022-05-31
Attending: SPECIALIST
Payer: MEDICARE

## 2022-05-31 PROCEDURE — 97113 AQUATIC THERAPY/EXERCISES: CPT

## 2022-05-31 NOTE — PROGRESS NOTES
[facility] KOKI MORRISON [facility], [address] [place] [address]  [phone] fax    Continued Plan of Care/ Re-certification for Physical Therapy Services      Patient name: Nathan Plascencia Start of Care: 22   Referral source: Maury Siemens, MD : 1950   Medical/Treatment Diagnosis: Other low back pain [M54.59]  Right knee pain [M25.561]  Payor: [place] MEDICARE / Plan: [place] MEDICARE PART A & B / Product Type: Medicare /  Onset Date:22     Prior Hospitalization: see medical history Provider#: [id number]   Medications: Verified on Patient Summary List    Comorbidities: arthritis, Heart dx-afib, abdominal hernia  Prior Level of Function:enjoyed gardening, wood work, travel    Visits from [facility] Care: 5    Missed Visits: 0    The Plan of Care and following information is based on the patient's current status:  Goal: Pt to be compliant with initial HEP to improve lumbar mobility, core/hip strength  Status at last note/certification: Established and reviewed with Pt  Current: progressing: pt reports doing HEP a couple times a week (22)  Goal: Pt to initiate aquatics program without increased pain to aid in achievement of all LTGs. Status at last note/certification: N/A  Current: met   Goal: Pt to increase standing tolerance to 30 minutes without fatigue in low back or right knee for ease of chores  Status at last note/certification: unable to assess  Current: progressing: 10-15 minutes  Goal: Pt to negoiate 4 steps with reciprocal pattern and 1 UE support without deviation for ease of household mobility  Status at last note/certification: unable to assess  Current: progressing: pt with varying pattern with predominately with step too pattern with BUE support (22)   Goal: Pt to increase bilateral hip strength to 4+/5 grossly to increase ease of household mobility and yard work.    Status at last note/certification: Right psoas: 4/5, quads: 4/5, glute med: 4/5, hamstrings 4-/5  Current: not met: Right psoas: 4/5, quads: 4/5, glute med (sitting): 4/5, hamstrings: 4-/5 (5/31/22)  Goal: Pt to report FOTO score of 65 to show improved function and quality of life. Status at last note/certification: FOTO 49 pts   Current: progressing: FOTO 56 pts (5/31/22)    Problems/ barriers to goal attainment: none     Problem List: pain affecting function, decrease ROM, decrease strength, edema affecting function, impaired gait/ balance, decrease ADL/ functional abilitiies, decrease activity tolerance, decrease flexibility/ joint mobility and decrease transfer abilities    Treatment Plan: Therapeutic exercise, Therapeutic activities, Neuromuscular re-education, Physical agent/modality, Gait/balance training, Manual therapy, Aquatic therapy, Patient education, Self Care training, Functional mobility training, Home safety training and Stair training     Patient Goal (s) has been updated and includes: \"I want to feel more free to do more things\"     Goals for this certification period to be accomplished in 5 weeks:  Goal: Pt to be compliant with initial HEP to improve lumbar mobility, core/hip strength  Status at last note/certification:  progressing: pt reports doing HEP a couple times a week (5/31/22)  Goal: Pt to increase standing tolerance to 30 minutes without fatigue in low back or right knee for ease of chores  Status at last note/certification: progressing: 10-15 minutes  Goal: Pt to negoiate 4 steps with reciprocal pattern and 1 UE support without deviation for ease of household mobility  Status at last note/certification:  progressing: pt with varying pattern with predominately with step too pattern with BUE support (5/31/22)   Goal: Pt to increase bilateral hip strength to 4+/5 grossly to increase ease of household mobility and yard work.    Status at last note/certification: not met: Right psoas: 4/5, quads: 4/5, glute med (sitting): 4/5, hamstrings: 4-/5 (5/31/22)  Goal: Pt to report FOTO score of 65 to show improved function and quality of life. Status at last note/certification: progressing: FOTO 56 pts (5/31/22)    Frequency / Duration: Patient to be seen 2 times per week for 5 weeks:    Assessment / Recommendations:Pt has attended skilled therapy 5 and has made good progress thus far. Pt reports the following:    Functional Gains: confidence with moving around, decreased pain,   Functional Deficits: bending over, and putting a lot of pressure on right knee  % improvement: 30%  Pain   Average: 4/10       Best: 1/10     Worst: 6-7/10  Patient Goal: \"I want to feel more free to do more things\"  Pt would benefit from continued skilled therapy to address ROM, strength, balance, and functional mobility. Certification Period: 5/31/22-6/29/22    Holger Soto, PT 5/31/2022 9:53 AM    ________________________________________________________________________  I certify that the above Therapy Services are being furnished while the patient is under my care. I agree with the treatment plan and certify that this therapy is necessary. [] I have read the above and request that my patient continue as recommended.   [] I have read the above report and request that my patient continue therapy with the following changes/special instructions: ______________________________________  [] I have read the above report and request that my patient be discharged from therapy    Physician's Signature:____________Date:_________TIME:________     Wilbert Salomon MD  ** Signature, Date and Time must be completed for valid certification **    Please sign and return to [facility] KOKI PERKINS [address]  [phone] fax

## 2022-06-02 ENCOUNTER — HOSPITAL ENCOUNTER (OUTPATIENT)
Dept: PHYSICAL THERAPY | Age: 72
Discharge: HOME | End: 2022-06-02
Attending: SPECIALIST
Payer: MEDICARE

## 2022-06-02 PROCEDURE — 97113 AQUATIC THERAPY/EXERCISES: CPT

## 2022-06-07 ENCOUNTER — HOSPITAL ENCOUNTER (OUTPATIENT)
Dept: PHYSICAL THERAPY | Age: 72
Discharge: HOME | End: 2022-06-07
Attending: SPECIALIST
Payer: MEDICARE

## 2022-06-07 PROCEDURE — 97112 NEUROMUSCULAR REEDUCATION: CPT

## 2022-06-07 PROCEDURE — 97110 THERAPEUTIC EXERCISES: CPT

## 2022-06-07 NOTE — PROGRESS NOTES
PT DAILY TREATMENT NOTE     Patient Name: Ilda Braga  Date:2022  : 1950  [x]  Patient  Verified  Payor: Yuliyajeanette [place] / Plan: [place] MEDICARE PART A & B / Product Type: Medicare /    In time:7:34  Out time:8:27  Total Treatment Time (min): 53  Visit #: 2 of 10    Medicare/BCBS Only   Total Timed Codes (min):  53 1:1 Treatment Time:  45       Treatment Area: Other low back pain [M54.59]  Right knee pain [M25.561]    SUBJECTIVE  Pain Level (0-10 scale): Right knee: /10, Low back: 2/10   Any medication changes, allergies to medications, adverse drug reactions, diagnosis change, or new procedure performed?: [x] No    [] Yes (see summary sheet for update)  Subjective functional status/changes:   [] No changes reported  Patient reports improvement with the aquatic therapy. OBJECTIVE    43 min Therapeutic Exercise:  [x] See flow sheet :   Rationale: increase ROM and increase strength to improve the patients ability to perform ADls safely. 10 min Neuromuscular Re-education:  [x]  See flow sheet : Balance activities    Rationale: increase strength, improve coordination, improve balance and increase proprioception  to improve the patients ability to perform community ADLs         With   [x] TE   [] TA   [] neuro   [] other: Patient Education: [x] Review HEP    [] Progressed/Changed HEP based on:   [] positioning   [] body mechanics   [] transfers   [] heat/ice application    [x] other: Patient asked therapist about riding bike in community and stated he recently rode approximately 1/2 mile and felt ok without increased pain. Advised patient to wait approximately 1 week and then discuss with therapist again to determine first how patient responds to land based PT.  Patient also asked about walking on a regular basis and therapist discussed having patient walk on treadmill in clinic with therapist observing to ensure proper gait mechanics and avoiding compensatory walking patterns before starting a walking program that may reinforce poor walking mechanics. Patient was in agreement. Therapist also discussed with patient avoiding increased pain with activity and to stop or hold an activity if pain increases. Other Objective/Functional Measures: Initiated land therapy    Right knee AROM flexion: 125deg (no pain reported)   Pain Level (0-10 scale) post treatment: right knee: 0/10, Low back: 1-2/10 (described as fatigue)     ASSESSMENT/Changes in Function: Therapist provided cues for correct technique and muscle activation with exercises. Advised patient to notify therapist if any exercises cause increased pain so it can be modified as needed. Following standing exercises patient reported feeling discomfort in posterior knee (where patient typically feels discomfort) so transitioned to plinth exercises. Patient reported decreased pain at end of the session. Patient will continue to benefit from skilled PT services to modify and progress therapeutic interventions, address functional mobility deficits, address ROM deficits, address strength deficits, analyze and address soft tissue restrictions, analyze and cue movement patterns, analyze and modify body mechanics/ergonomics, assess and modify postural abnormalities and address imbalance/dizziness to attain remaining goals.      []  See Plan of Care  []  See progress note/recertification  []  See Discharge Summary         Progress towards goals / Updated goals:    Goals for this certification period to be accomplished in 5 weeks:  Goal: Pt to be compliant with initial HEP to improve lumbar mobility, core/hip strength  Status at last note/certification:  progressing: pt reports doing HEP a couple times a week (5/31/22)  Goal: Pt to increase standing tolerance to 30 minutes without fatigue in low back or right knee for ease of chores  Status at last note/certification: progressing: 10-15 minutes   Current: Progressing: Patient was able to tolerate approximately 15 minutes of standing exercises, then needed to sit due to pain in right knee. (6/7/22)   Goal: Pt to negoiate 4 steps with reciprocal pattern and 1 UE support without deviation for ease of household mobility  Status at last note/certification:  progressing: pt with varying pattern with predominately with step too pattern with BUE support (5/31/22)   Goal: Pt to increase bilateral hip strength to 4+/5 grossly to increase ease of household mobility and yard work. Status at last note/certification: not met: Right psoas: 4/5, quads: 4/5, glute med (sitting): 4/5, hamstrings: 4-/5 (5/31/22)  Goal: Pt to report FOTO score of 65 to show improved function and quality of life.   Status at last note/certification: progressing: FOTO 56 pts (5/31/22)       PLAN  []  Upgrade activities as tolerated     [x]  Continue plan of care  []  Update interventions per flow sheet       []  Discharge due to:_  []  Other:_      Daniel Baeza, PT 6/7/2022  7:45 AM    Future Appointments   Date Time Provider Kristi Lakhani   6/9/2022  7:30 AM Kary Lepe, PT [facility]   6/14/2022 11:15 AM Lester Dalal, PT [facility]   6/21/2022  7:30 AM Jacky Erickson, [facility]   6/23/2022  7:30 AM Jacky Erickson, [facility]   8/2/2022  8:40 AM Amina Turner MD [facility] BS AMB   1/13/2023  9:00 AM [facility] [place] [facility]OUND [facility] [address]   1/13/2023  9:40 AM Marielle Hayward, PA-FEROZ [address]

## 2022-06-09 ENCOUNTER — HOSPITAL ENCOUNTER (OUTPATIENT)
Dept: PHYSICAL THERAPY | Age: 72
Discharge: HOME | End: 2022-06-09
Attending: SPECIALIST
Payer: MEDICARE

## 2022-06-09 PROCEDURE — 97110 THERAPEUTIC EXERCISES: CPT

## 2022-06-09 PROCEDURE — 97112 NEUROMUSCULAR REEDUCATION: CPT

## 2022-06-09 NOTE — PROGRESS NOTES
PT DAILY TREATMENT NOTE     Patient Name: Alaln Cui  Date:2022  : 1950  [x]  Patient  Verified  Payor: [place]  / Plan: [place] MEDICARE PART A & B / Product Type: Medicare /    In time:7:31  Out time: 8:13  Total Treatment Time (min): 42  Visit #: 3 of 10    Medicare/BCBS Only   Total Timed Codes (min):  42 1:1 Treatment Time:  42       Treatment Area: Other low back pain [M54.59]  Right knee pain [M25.561]    SUBJECTIVE  Pain Level (0-10 scale): knee: 3/10, LBP: 5/10  Any medication changes, allergies to medications, adverse drug reactions, diagnosis change, or new procedure performed?: [x] No    [] Yes (see summary sheet for update)  Subjective functional status/changes:   [] No changes reported  Patient stated that he had increased soreness in knee and low back following doing land based PT for the first time last session. Patient also stated he is seeing a spine specialist at the end of the month. OBJECTIVE    34 min Therapeutic Exercise:  [x] See flow sheet : Focus on improving available Trunk and (B) LE ROM and strength; modified standing trunk rotation that patient was doing at home to Supine LTR (10x) and patient reported that felt better. Rationale: increase ROM and increase strength to improve the patients ability to perform ADLs safely. 8 min Neuromuscular Re-education:  [x]  See flow sheet : focus on improving stability with balance activities, and glut/quad musculature activation to improve LE kinesthetic awareness and proprioception. Rationale: increase strength, improve coordination, improve balance and increase proprioception  to improve the patients ability to perform household management tasks safely.          With   [] TE   [] TA   [] neuro   [] other: Patient Education: [x] Review HEP    [] Progressed/Changed HEP based on:   [] positioning   [] body mechanics   [] transfers   [] heat/ice application    [] other:      Other Objective/Functional Measures: Attempted supine hip flexor stretch, but patient did not feel a stretch so held. Pain Level (0-10 scale) post treatment: Right knee: 3/10, LBP: 3/10     ASSESSMENT/Changes in Function: Therapist provided cues for correct technique and muscle activation with exercises. Due to report of higher level of pain reported after doing land based exercises last session, had patient focus on plinth exercises to determine how patient responds to that. Plan to update HEP program next session pending patient's response to exercises today. Advised patient to notify therapist if any exercises caused increased pain or discomfort at hernia so could be modified as needed. Patient responded well to exercises and reported no increase in right knee pain and decreased LBP. Patient has difficulty with standing tolerance and reports increased pain in low back down right LE with standing for a few minutes. Patient will continue to benefit from skilled PT services to modify and progress therapeutic interventions, address functional mobility deficits, address ROM deficits, address strength deficits, analyze and address soft tissue restrictions, analyze and cue movement patterns, analyze and modify body mechanics/ergonomics, assess and modify postural abnormalities and address imbalance/dizziness to attain remaining goals.      []  See Plan of Care  []  See progress note/recertification  []  See Discharge Summary         Progress towards goals / Updated goals:  Goals for this certification period to be accomplished in 5 weeks:  Goal: Pt to be compliant with initial HEP to improve lumbar mobility, core/hip strength  Status at last note/certification:  progressing: pt reports doing HEP a couple times a week (5/31/22)  Goal: Pt to increase standing tolerance to 30 minutes without fatigue in low back or right knee for ease of chores  Status at last note/certification: progressing: 10-15 minutes   Current: Progressing: Patient was able to tolerate approximately 15 minutes of standing exercises, then needed to sit due to pain in right knee. (6/7/22)   Goal: Pt to negoiate 4 steps with reciprocal pattern and 1 UE support without deviation for ease of household mobility  Status at last note/certification:  progressing: pt with varying pattern with predominately with step too pattern with BUE support (5/31/22)   Goal: Pt to increase bilateral hip strength to 4+/5 grossly to increase ease of household mobility and yard work.   Status at last note/certification: not met: Right psoas: 4/5, quads: 4/5, glute med (sitting): 4/5, hamstrings: 4-/5 (5/31/22)  Goal: Pt to report FOTO score of 65 to show improved function and quality of life.   Status at last note/certification: progressing: FOTO 56 pts (5/31/22)    PLAN  []  Upgrade activities as tolerated     [x]  Continue plan of care  []  Update interventions per flow sheet       []  Discharge due to:_  []  Other:_      Tate Hoang, PT 6/9/2022  7:35 AM    Future Appointments   Date Time Provider Kristi Lakhani   6/14/2022 11:15 AM Rebecca Thurman, PT [facility]   6/21/2022  7:30 AM Rohan Erickson, PT [facility]   6/23/2022  7:30 AM Rohan Erickson, PT [facility]   7/6/2022  9:30 AM Noah Cuadra MD [facility] AM[facility]   8/2/2022  8:40 AM Lynn Taylor MD [facility] BS AMB   1/13/2023  9:00 AM [facility] [place] [facility] SCHED   1/13/2023  9:40 AM Alex Hayward Courser, PA-C [id number]

## 2022-06-13 ENCOUNTER — DOCUMENTATION ONLY (OUTPATIENT)
Dept: ORTHOPEDIC SURGERY | Age: 72
End: 2022-06-13

## 2022-06-14 ENCOUNTER — HOSPITAL ENCOUNTER (OUTPATIENT)
Dept: PHYSICAL THERAPY | Age: 72
Discharge: HOME | End: 2022-06-14
Attending: SPECIALIST
Payer: MEDICARE

## 2022-06-14 PROCEDURE — 97110 THERAPEUTIC EXERCISES: CPT

## 2022-06-14 PROCEDURE — 97112 NEUROMUSCULAR REEDUCATION: CPT

## 2022-06-14 PROCEDURE — 97530 THERAPEUTIC ACTIVITIES: CPT

## 2022-06-14 NOTE — PROGRESS NOTES
PT DAILY TREATMENT NOTE     Patient Name: Kacie Rios  Date:2022  : 1950  [x]  Patient  Verified  Payor: Fabiola Echeverria / Plan: [place] MEDICARE PART A & B / Product Type: Medicare /    In time:11:18  Out time:12:06  Total Treatment Time (min): 48  Visit #: 3 of 10    Medicare/BCBS Only   Total Timed Codes (min):  48 1:1 Treatment Time:  48       Treatment Area: Other low back pain [M54.59]  Right knee pain [M25.561]    SUBJECTIVE  Pain Level (0-10 scale): 2 (R. Knee), 1 (LBP)  Any medication changes, allergies to medications, adverse drug reactions, diagnosis change, or new procedure performed?: [x] No    [] Yes (see summary sheet for update)  Subjective functional status/changes:   [] No changes reported  I have a little pain in my knee and Lower back. OBJECTIVE    28 min Therapeutic Exercise:  [] See flow sheet :   Rationale: increase ROM and increase strength to improve the patients ability to improve functional strength in B. LE    10 min Therapeutic Activity:  []  See flow sheet :   Rationale: increase strength, improve coordination, improve balance and increase proprioception  to improve the patients ability to negotiate stairs with a reciprocal gait pattern      10 min Neuromuscular Re-education:  []  See flow sheet :   Rationale: improve coordination, improve balance and increase proprioception  to improve the patients ability to improve posture/ body mechanics, during ADLs. With   [x] TE   [x] TA   [x] neuro   [] other: Patient Education: [x] Review HEP    [] Progressed/Changed HEP based on:   [] positioning   [] body mechanics   [] transfers   [] heat/ice application    [] other:      Other Objective/Functional Measures: exercises progressed. Resistance added to LAQs and standing HS curls. Pain Level (0-10 scale) post treatment: 0    ASSESSMENT/Changes in Function: Session was tolerated well w/ no symptom increases reported.  Therapist provided both tactile and verbal cues during Bilateral knee fallout to improve core activation and prevent compensation during exercise. Pt demonstrated improved eccentric hip abduction following instruction. Pt required Mild UE support during standing HR/ TR to improve stability. Demonstrative cues provided during standing hip 3 ways with emphasis on maintaining a neutral trunk. Pt was able to demonstrate improved glute activation and greater postural control. Pt was able to complete hip 3 ways w/o compensations following instruction. All post treatment modalities were denied. Pt verbalized a \"tiredness\" in the LB during hook lying abduction, related to core activation, but did not impact ability to perform activity. Continued therapy recommended to address pain and functional strength deficits in B LEs. Patient will continue to benefit from skilled PT services to modify and progress therapeutic interventions, address functional mobility deficits, address ROM deficits, address strength deficits, analyze and address soft tissue restrictions, analyze and cue movement patterns and analyze and modify body mechanics/ergonomics to attain remaining goals.      []  See Plan of Care  []  See progress note/recertification  []  See Discharge Summary         Progress towards goals / Updated goals:  Goals for this certification period to be accomplished in 5 weeks:  Goal: Pt to be compliant with initial HEP to improve lumbar mobility, core/hip strength  Status at last note/certification:  progressing: pt reports doing HEP a couple times a week (5/31/22)  Goal: Pt to increase standing tolerance to 30 minutes without fatigue in low back or right knee for ease of chores  Status at last note/certification: progressing: 10-15 minutes   Current: Progressing: Patient was able to tolerate approximately 15 minutes of standing exercises, then needed to sit due to pain in right knee. (6/7/22)   Goal: Pt to negoiate 4 steps with reciprocal pattern and 1 UE support without deviation for ease of household mobility  Status at last note/certification:  progressing: pt with varying pattern with predominately with step too pattern with BUE support (5/31/22)   Goal: Pt to increase bilateral hip strength to 4+/5 grossly to increase ease of household mobility and yard work.   Status at last note/certification: not met: Right psoas: 4/5, quads: 4/5, glute med (sitting): 4/5, hamstrings: 4-/5 (5/31/22)  Goal: Pt to report FOTO score of 65 to show improved function and quality of life.   Status at last note/certification: progressing: FOTO 56 pts (5/31/22)    PLAN  [x]  Upgrade activities as tolerated     [x]  Continue plan of care  []  Update interventions per flow sheet       []  Discharge due to:_  []  Other:_      Tiesha Chung, PTA 6/14/2022  11:23 AM    Future Appointments   Date Time Provider Kristi Lakhani   6/21/2022  7:30 AM DEV[facility] [place] [address] [facility]   6/23/2022  7:30 AM Ronnell Erickson, PT [facility]   7/6/2022  9:30 AM Suzie Mackey MD [facility] BS AMB   8/2/2022  8:40 AM Isaiah Aaron MD [facility] BS AMB   1/13/2023  9:00 AM [facility] ULTRASOUND [facility] [address] [facility] [address]   1/13/2023  9:40 AM Bryan Hayward PA-C [address] [facility]

## 2022-06-15 ENCOUNTER — OFFICE VISIT (OUTPATIENT)
Dept: ORTHOPEDIC SURGERY | Age: 72
End: 2022-06-15
Payer: MEDICARE

## 2022-06-15 VITALS — TEMPERATURE: 97.3 F | WEIGHT: 240 LBS | BODY MASS INDEX: 35.55 KG/M2 | HEIGHT: 69 IN

## 2022-06-15 DIAGNOSIS — M17.11 PRIMARY OSTEOARTHRITIS OF RIGHT KNEE: ICD-10-CM

## 2022-06-15 DIAGNOSIS — M25.561 CHRONIC PAIN OF RIGHT KNEE: Primary | ICD-10-CM

## 2022-06-15 DIAGNOSIS — G89.29 CHRONIC PAIN OF RIGHT KNEE: Primary | ICD-10-CM

## 2022-06-15 PROCEDURE — 20610 DRAIN/INJ JOINT/BURSA W/O US: CPT | Performed by: SPECIALIST

## 2022-06-15 NOTE — PROGRESS NOTES
Patient: Yaritza Garcia                MRN: [id number]       SSN: [id number]  YOB: 1950        AGE: 66 y.o. SEX: male    PCP: Juhi Garcia MD  06/15/22    CC: RIGHT KNEE PAIN    HISTORY:  Yaritza Garcia is a 66 y.o. male who is seen for right knee pain. He has been experiencing right knee pain for the past week. He noticed posterior knee pain when he lost his balance on a recent flight. He had to have a wheelchair to get off the plane. He was seen at [facility] on 4/18/22 where right knee x-rays revealed OA. He was prescribed cyclobenzaprine HCL and a steroid taper pack. He feels posterior knee pain with standing, walking and stair climbing. He also notes right foot soreness and stiffness. He has to hold onto a cart at the grocery store to walk. He was previously seen for back pain and stiffness. He tweaked his back twice while vacuuming in the past several months. He also tweaked his back while he leaned over in the shower in [facility], last weekend. His pains radiate down his legs. He responded to an injection last week. He will have postpone his upcoming trip to [facility] due to his severe acute back pain flareup. Pain Assessment  6/15/2022   Location of Pain Knee   Location Modifiers Right   Severity of Pain 5   Quality of Pain Aching;Dull   Quality of Pain Comment -   Duration of Pain Persistent   Frequency of Pain Intermittent   Date Pain First Started (No Data)   Date Pain First Started Comment f/u   Aggravating Factors Walking;Stairs;Standing;Squatting;Kneeling;Exercise;Stretching;Straightening;Bending   Limiting Behavior Some   Relieving Factors Nothing   Relieving Factors Comment -   Result of Injury No   Work-Related Injury -   Type of Injury -     Occupation, etc:  Mr. Taylor Campbell is retired.  He worked as a commercial bank lender and senior  for [employer]  He is going to [facility]; he canceled his vacation to [place] and cruise on [place] [employer] [place]. He wants to reschedule a cruise. He has 2 daughters -- one in [place] and one in [facility]. He has a grandson and granddaughter. He is going to [facility] this weekend. He met his wife at [facility]. Mr. Caroline Sharma weighs 240 lbs and is 5'10\" tall. He has a h/o Afib. He hasn't taken Eliquis in over 1.5 years. No results found for: HBA1C, AGM9WLLE, UNW6KFMK, DIM1QNHP  Weight Metrics 6/15/2022 4/29/2022 4/22/2022 2/1/2022 1/12/2022 7/19/2021 12/1/2020   Weight 240 lb 240 lb 9.6 oz 243 lb 247 lb 246 lb 246 lb 246 lb   BMI 35.44 kg/m2 35.53 kg/m2 34.87 kg/m2 35.44 kg/m2 35.3 kg/m2 35.3 kg/m2 35.3 kg/m2       Patient Active Problem List   Diagnosis Code    Anal fissure K60.2    HTN (hypertension) I10    Hypercholesteremia E78.00    S/P ablation of atrial fibrillation Z98.890, Z86.79    Other specified diseases of intestine K63.89    Diverticulitis K57.92    Severe obesity ([address] E66.01     REVIEW OF SYSTEMS:    Constitutional Symptoms: Negative   Eyes: Negative   Ears, Nose, Throat and Mouth: Negative   Cardiovascular: Negative   Respiratory: Negative   Genitourinary: Per HPI   Gastrointestinal: Per HPI   Integumentary (Skin and/or Breast): Negative   Musculoskeletal: Per HPI   Endocrine/Rheumatologic: Negative   Neurological: Per HPI   Hematology/Lymphatic: Negative    Allergic/Immunologic: Negative   Phychiatric: Negative    Social History     Socioeconomic History    Marital status:      Spouse name: Not on file    Number of children: Not on file    Years of education: Not on file    Highest education level: Not on file   Occupational History    Not on file   Tobacco Use    Smoking status: Never Smoker    Smokeless tobacco: Never Used   Substance and Sexual Activity    Alcohol use:  Yes    Drug use: No    Sexual activity: Not on file   Other Topics Concern    Not on file   Social History Narrative    Not on file     Social Determinants of Health     Financial Resource Strain:     Difficulty of Paying Living Expenses: Not on file   Food Insecurity:     Worried About Running Out of Food in the Last Year: Not on file    Devyn of Food in the Last Year: Not on file   Transportation Needs:     Lack of Transportation (Medical): Not on file    Lack of Transportation (Non-Medical): Not on file   Physical Activity:     Days of Exercise per Week: Not on file    Minutes of Exercise per Session: Not on file   Stress:     Feeling of Stress : Not on file   Social Connections:     Frequency of Communication with Friends and Family: Not on file    Frequency of Social Gatherings with Friends and Family: Not on file    Attends [employer]ist Services: Not on file    Active Member of [address] [place] [address] or Organizations: Not on file    Attends Club or Organization Meetings: Not on file    Marital Status: Not on file   Intimate Partner Violence:     Fear of Current or Ex-Partner: Not on file    Emotionally Abused: Not on file    Physically Abused: Not on file    Sexually Abused: Not on file   Housing Stability:     Unable to Pay for Housing in the Last Year: Not on file    Number of [place] in the Last Year: Not on file    Unstable Housing in the Last Year: Not on file      Allergies   Allergen Reactions    Lipitor [Atorvastatin] Myalgia      Current Outpatient Medications   Medication Sig    rosuvastatin (CRESTOR) 20 mg tablet TAKE 1 TABLET BY MOUTH    metoprolol succinate (TOPROL-XL) 50 mg XL tablet TAKE 1 TABLET BY MOUTH DAILY    fluticasone propionate (FLONASE) 50 mcg/actuation nasal spray     ferrous fumarate/vit Bcomp,C (SUPER B COMPLEX PO) Take  by mouth.  psyllium seed, with dextrose, (FIBER PO) Take  by mouth. (Patient not taking: Reported on 4/22/2022)    azelastine (ASTELIN) 137 mcg (0.1 %) nasal spray 1 Spray as needed for Rhinitis. Use in each nostril as directed    fish oil-dha-epa 1,200-144-216 mg cap Take  by mouth.     folic acid/multivit-min/lutein (CENTRUM SILVER PO) Take  by mouth.  montelukast (SINGULAIR) 10 mg tablet Take 10 mg by mouth daily.  loratadine (CLARITIN) 10 mg tablet Take 10 mg by mouth. No current facility-administered medications for this visit. PHYSICAL EXAMINATION:  Visit Vitals  Temp 97.3 °F (36.3 °C) (Temporal)   Ht 5' 9\" (1.753 m)   Wt 240 lb (108.9 kg)   BMI 35.44 kg/m²      ORTHO EXAMINATION:  Examination Right knee Left knee   Skin Intact Intact   Range of motion 100-0 120-0   Effusion +++ -   Medial joint line tenderness - -   Lateral joint line tenderness - -   Popliteal tenderness - -   Osteophytes palpable + +   Dalias - -   Patella crepitus + +   Anterior drawer - -   Lateral laxity - -   Medial laxity - -   Varus deformity - -   Valgus deformity - -   Pretibial edema - -   Calf tenderness - -   Posterior tenderness     Examination Lumbar Thoracic   Skin Intact Intact   Tenderness + paralumbar -   Tightness + paralumbar -   Lordosis Normal N/A   Kyphosis N/A Normal   Scoliosis - -   Flexion Fingertips to mid shin N/A   Extension 10 N/A   Knee reflexes Normal N/A   Ankle reflexes Normal N/A   Straight leg raise - N/A   Calf tenderness - N/A      TIME OUT:  Chart reviewed for the following:   I, Hillary Donahue MD, have reviewed the History, Physical and updated the Allergic reactions for [address] performed immediately prior to start of procedure:  Oumou Marie MD, have performed the following reviews on [facility] prior to the start of the procedure:          * Patient was identified by name and date of birth   * Agreement on procedure being performed was verified  * Risks and Benefits explained to the patient  * Procedure site verified and marked as necessary  * Patient was positioned for comfort  * Consent was obtained     Time: 9:22 AM     Date of procedure: 6/15/2022  Procedure performed by:  Hillary Donahue MD  Mr. Bryan Tellez tolerated the procedure well with no complications. RADIOGRAPHS:  XR LUMB SPINE 10/13/21 ALAN BO  IMPRESSION:  Degenerative changes lumbar spine. XR RIGHT KNEE PATIENT FIRST 4/18/22   Impression: mild to moderate DJD of medial compartment    IMPRESSION:      ICD-10-CM ICD-9-CM    1. Chronic pain of right knee  M25.561 719.46 sodium hyaluronate (SUPARTZ FX/EUFLEXXA/HYALGAN) 10 mg/mL injection syrg 20 mg    G89.29 338.29 DRAIN/INJECT LARGE JOINT/BURSA   2. Primary osteoarthritis of right knee  M17.11 715.16 sodium hyaluronate (SUPARTZ FX/EUFLEXXA/HYALGAN) 10 mg/mL injection syrg 20 mg      DRAIN/INJECT LARGE JOINT/BURSA     PLAN:  After discussing treatment options, patient's right knee was injected with 2 cc Euflexxa. There is no need for surgery. He will follow up in 1 week for Euflexxa #2.       Scribed by Santi He ([address]) as dictated by Lesia Guerrero MD

## 2022-06-20 ENCOUNTER — OFFICE VISIT (OUTPATIENT)
Dept: ORTHOPEDIC SURGERY | Age: 72
End: 2022-06-20
Payer: MEDICARE

## 2022-06-20 VITALS
TEMPERATURE: 97.5 F | BODY MASS INDEX: 36.17 KG/M2 | WEIGHT: 244.2 LBS | HEIGHT: 69 IN | OXYGEN SATURATION: 97 % | HEART RATE: 83 BPM

## 2022-06-20 DIAGNOSIS — M25.561 CHRONIC PAIN OF RIGHT KNEE: ICD-10-CM

## 2022-06-20 DIAGNOSIS — G89.29 CHRONIC PAIN OF RIGHT KNEE: ICD-10-CM

## 2022-06-20 DIAGNOSIS — M17.11 PRIMARY OSTEOARTHRITIS OF RIGHT KNEE: Primary | ICD-10-CM

## 2022-06-20 PROCEDURE — 20610 DRAIN/INJ JOINT/BURSA W/O US: CPT | Performed by: SPECIALIST

## 2022-06-20 NOTE — PROGRESS NOTES
Patient: Anastasiya Garza                MRN: [id number]       SSN: [id number]  YOB: 1950        AGE: 66 y.o. SEX: male  Body mass index is 36.06 kg/m². PCP: Mariana Bell MD  06/20/22    Chief Complaint   Patient presents with    Knee Pain     rt     HISTORY:  Anastasiya Garza is a 66 y.o. male who is seen for right knee pain. ICD-10-CM ICD-9-CM    1. Primary osteoarthritis of right knee  M17.11 715.16 DRAIN/INJECT LARGE JOINT/BURSA      sodium hyaluronate (SUPARTZ FX/EUFLEXXA/HYALGAN) 10 mg/mL injection syrg 20 mg   2. Chronic pain of right knee  M25.561 719.46 DRAIN/INJECT LARGE JOINT/BURSA    G89.29 338.29 sodium hyaluronate (SUPARTZ FX/EUFLEXXA/HYALGAN) 10 mg/mL injection syrg 20 mg       Chart reviewed for the following:   Norma Dwyer MD, have reviewed the History, Physical and updated the Allergic reactions for [address] performed immediately prior to start of procedure:  Norma Dwyer MD, have performed the following reviews on Anastasiya Garza prior to the start of the procedure:            * Patient was identified by name and date of birth   * Agreement on procedure being performed was verified  * Risks and Benefits explained to the patient  * Procedure site verified and marked as necessary  * Patient was positioned for comfort  * Consent was obtained     Time: 3:28 PM      Date of procedure: 6/20/2022    Procedure performed by:  La Dc MD    Mr. Josue Valentino tolerated the procedure well with no complications. PLAN:  After discussing treatment options, patient's right knee was injected with 2 cc of Euflexxa. Mr. Josue Valentino will follow up in one week to complete his visco supplementation injection series.       Scribed by La Dc MD [address]) as dictated by La Dc MD

## 2022-06-21 ENCOUNTER — HOSPITAL ENCOUNTER (OUTPATIENT)
Dept: PHYSICAL THERAPY | Age: 72
Discharge: HOME | End: 2022-06-21
Attending: SPECIALIST
Payer: MEDICARE

## 2022-06-21 PROCEDURE — 97110 THERAPEUTIC EXERCISES: CPT

## 2022-06-21 PROCEDURE — 97530 THERAPEUTIC ACTIVITIES: CPT

## 2022-06-21 PROCEDURE — 97112 NEUROMUSCULAR REEDUCATION: CPT

## 2022-06-21 NOTE — PROGRESS NOTES
PT DAILY TREATMENT NOTE     Patient Name: Stephen Vega  Date:2022  : 1950  [x]  Patient  Verified  Payor: Priscila Tripathi / Plan: [place] MEDICARE PART [place] & B / Product Type: Medicare /    In time:7:30  Out time:8:15  Total Treatment Time (min): 45  Visit #: 5 of 10    Medicare/BCBS Only   Total Timed Codes (min):  45 1:1 Treatment Time:  45       Treatment Area: Other low back pain [M54.59]  Right knee pain [M25.561]    SUBJECTIVE  Pain Level (0-10 scale): 2/10  Any medication changes, allergies to medications, adverse drug reactions, diagnosis change, or new procedure performed?: [x] No    [] Yes (see summary sheet for update)  Subjective functional status/changes:   [] No changes reported  I'm ok today. I spent last week in my cabin so I'm stiffer than usual    OBJECTIVE    10 min Therapeutic Exercise:  [] See flow sheet :   Rationale: increase ROM and increase strength to improve the patients ability to perform ADLs    25 min Therapeutic Activity:  []  See flow sheet :   Rationale: increase strength, improve coordination and improve balance  to improve the patients ability to stand for long periods of time. 10 min Neuromuscular Re-education:  []  See flow sheet :   Rationale: improve coordination, improve balance and increase proprioception  to improve the patients ability to maintain balance in SLS and decrease the risk of falls. With   [x] TE   [x] TA   [] neuro   [] other: Patient Education: [x] Review HEP    [] Progressed/Changed HEP based on:   [] positioning   [] body mechanics   [] transfers   [] heat/ice application    [] other:      Other Objective/Functional Measures: exercises per chart. Pain Level (0-10 scale) post treatment: 3/10    ASSESSMENT/Changes in Function: Pt reports to skilled therapy session with decreased functional ROM and strength, in the R. LE/ lower back.  He was able to progress resistance on standing hip 3 ways to improve functional LE strength and improve ease with bike riding. Pt tolerated the addition on banded monster walks and side steps to increase standing/ walking tolerance. Therapist provided visual demonstrations during banded side steps with patient showing improved sequencing following instruction. Therapeutic rest breaks were taken during standing exercises, due to increases in systemic fatigue. Pt is making progress towards his goals as shown by his ability to descend steps with a reciprocal gait pattern, however lack of eccentric control was noted in the R. LE. Step downs were introduced to to improve LE strength and improve safety with stair descent. Session tolerated well, with no increases in symptoms noted during treatment. Patient will continue to benefit from skilled PT services to modify and progress therapeutic interventions, address functional mobility deficits, address ROM deficits, address strength deficits, analyze and address soft tissue restrictions, analyze and cue movement patterns, analyze and modify body mechanics/ergonomics and assess and modify postural abnormalities to attain remaining goals.      []  See Plan of Care  []  See progress note/recertification  []  See Discharge Summary         Progress towards goals / Updated goals:  Goals for this certification period to be accomplished in 5 weeks:  Goal: Pt to be compliant with initial HEP to improve lumbar mobility, core/hip strength  Status at last note/certification:  progressing: pt reports doing HEP a couple times a week (5/31/22)  Goal: Pt to increase standing tolerance to 30 minutes without fatigue in low back or right knee for ease of chores  Status at last note/certification: progressing: 10-15 minutes   Current: Progressing: Patient was able to tolerate approximately 15 minutes of standing exercises, then needed to sit due to pain in right knee. (6/7/22)   Goal: Pt to negoiate 4 steps with reciprocal pattern and 1 UE support without deviation for ease of household mobility  Status at last note/certification:  progressing: Pt able to descend with a reciprocal gait pattern, with some instability due to lack of eccentric control (6/21/2022)   Goal: Pt to increase bilateral hip strength to 4+/5 grossly to increase ease of household mobility and yard work.   Status at last note/certification: not met: Right psoas: 4/5, quads: 4/5, glute med (sitting): 4/5, hamstrings: 4-/5 (5/31/22)  Goal: Pt to report FOTO score of 65 to show improved function and quality of life.   Status at last note/certification: progressing: FOTO 56 pts (5/31/22)       PLAN  [x]  Upgrade activities as tolerated     [x]  Continue plan of care  []  Update interventions per flow sheet       []  Discharge due to:_  []  Other:_      Ladarius Butcher, PTA 6/21/2022  7:32 AM    Future Appointments   Date Time Provider Kristi Cheathami   6/23/2022  7:30 AM Ronnell Erickson, PT [facility]   6/24/2022  9:10 AM Felton Carballo MD [facility] BS AMB   7/6/2022  9:30 AM Suzie Mackey MD [facility] BS AMB   8/2/2022  8:40 AM Isaiah Aaron MD [facility] BS AMB   1/13/2023  9:00 AM [facility] ULTRASOUND [facility] NOEMY [address]   1/13/2023  9:40 AM Bryan Hayward PA-C [address]

## 2022-06-23 ENCOUNTER — HOSPITAL ENCOUNTER (OUTPATIENT)
Dept: PHYSICAL THERAPY | Age: 72
Discharge: HOME | End: 2022-06-23
Attending: SPECIALIST
Payer: MEDICARE

## 2022-06-23 PROCEDURE — 97530 THERAPEUTIC ACTIVITIES: CPT

## 2022-06-23 PROCEDURE — 97110 THERAPEUTIC EXERCISES: CPT

## 2022-06-23 NOTE — PROGRESS NOTES
[facility] - [facility] Quique Mittal Luis [address]  [phone] fax    Continued Plan of Care/ Re-certification for Physical Therapy Services      Patient name: Maira Vazquez Start of Care: 22   Referral source: Yesenia Brown MD : 1950   Medical/Treatment Diagnosis: Other low back pain [M54.59]  Right knee pain [M25.561]  Payor: VA MEDICARE / Plan: VA MEDICARE PART A & B / Product Type: Medicare /  Onset Date:22      Prior Hospitalization: see medical history Provider#: [id number]   Medications: Verified on Patient Summary List     Comorbidities: arthritis, Heart dx-afib, abdominal hernia  Prior Level of Function:enjoyed gardening, wood work, travel    Visits from [facility]: 11    Missed Visits: 0    The Plan of Care and following information is based on the patient's current status:    Goal: Pt to be compliant with initial HEP to improve lumbar mobility, core/hip strength  Status at last note/certification:  progressing: pt reports doing HEP a couple times a week (22)  Current: progressing - Pt reports some compliance with updated HEP (22)  Goal: Pt to increase standing tolerance to 30 minutes without fatigue in low back or right knee for ease of chores  Status at last note/certification: progressing: 10-15 minutes   Current: progressing - standing tolerance 15 minutes prior to fatigue in low back, right knee (22)   Goal: Pt to negoiate 4 steps with reciprocal pattern and 1 UE support without deviation for ease of household mobility  Status at last note/certification:  progressing: Pt able to descend with a reciprocal gait pattern, with some instability due to lack of eccentric control (2022)   Current: not met - still descends stairs with reciprocal pattern, feeling of instability with 1 UE on rail, the other on the side wall (22)  Goal: Pt to increase bilateral hip strength to 4+/5 grossly to increase ease of household mobility and yard work.   Status at last note/certification: not met: Right psoas: 4/5, quads: 4/5, glute med (sitting): 4/5, hamstrings: 4-/5 (5/31/22)  Current: progressing - hip Flex 4+/5, hip ABD 4/5, hip Ext 4-/5, Hamstrings 5/5, hip ER/IR 5/5 (6/23/22)  Goal: Pt to report FOTO score of 65 to show improved function and quality of life. Status at last note/certification: progressing: FOTO 56 pts (5/31/22)  Current: not met - FOTO 54 pts - no functional deficits noted (6/23/22)    Key functional changes:  Pt has attended skilled therapy consistently for aquatic therapy and now land-based skilled therapy to address low back and right knee pain. Pt reports exercises have improved mobility in low back and right knee. Pt is able to ascend stairs reciprocally but doesn't feel as stable with descending stairs, as feels right knee might give way. Pt feels increased ease of moving around in general, but notes aching with static standing for prolonged periods. Pt notes stiffness with standing after prolonged sitting. Pt has started back trying to incorporate walking and biking program for exercise but is not able to perform consistently at this time. Problems/ barriers to goal attainment: None     Problem List: pain affecting function, decrease ROM, decrease strength, edema affecting function, impaired gait/ balance, decrease ADL/ functional abilitiies, decrease activity tolerance, decrease flexibility/ joint mobility and decrease transfer abilities    Treatment Plan: Therapeutic exercise, Therapeutic activities, Neuromuscular re-education, Physical agent/modality, Gait/balance training, Manual therapy, Aquatic therapy, Patient education, Functional mobility training and Stair training    Patient Goal (s) has been updated and includes: \"To be able to stand and walk more with less pain and stiffness. \"     Goals for this certification period to be accomplished in 4 weeks:  Goal: Pt to be compliant with initial HEP to improve lumbar mobility, core/hip strength  Status at last note/certification: Pt reports some compliance with updated HEP (6/23/22)  Current:   Goal: Pt to increase standing tolerance to 30 minutes without fatigue in low back or right knee for ease of chores  Status at last note/certification: standing tolerance 15 minutes prior to fatigue in low back, right knee (6/23/22)   Current:   Goal: Pt to negoiate 4 steps with reciprocal pattern and 1 UE support without deviation for ease of household mobility  Status at last note/certification: still descends stairs with reciprocal pattern, feeling of instability with 1 UE on rail, the other on the side wall (6/23/22)  Current:   Goal: Pt to increase bilateral hip strength to 4+/5 grossly to increase ease of household mobility and yard work.   Status at last note/certification:  hip Flex 4+/5, hip ABD 4/5, hip Ext 4-/5, Hamstrings 5/5, hip ER/IR 5/5 (6/23/22)  Current:   Goal: Pt to report FOTO score of 65 to show improved function and quality of life. Status at last note/certification: FOTO 54 pts - no functional deficits noted (6/23/22)  Current:     Frequency / Duration: Patient to be seen 2 times per week for 4 weeks:    Assessment / Recommendations: Pt would continue to benefit from skilled therapy on land to improve core/trunk, right LE stability to improve standing/amb tolerance without fatigue or pain and increase ease of stair negotiation without feeling of instability. Certification Period: 6/29/22 - 7/28/22    Sydnie Erickson, PT 6/23/2022 8:38 AM    ________________________________________________________________________  I certify that the above Therapy Services are being furnished while the patient is under my care. I agree with the treatment plan and certify that this therapy is necessary. [] I have read the above and request that my patient continue as recommended.   [] I have read the above report and request that my patient continue therapy with the following changes/special instructions: ______________________________________  [] I have read the above report and request that my patient be discharged from therapy    Physician's Signature:____________Date:_________TIME:________    ** Signature, Date and Time must be completed for valid certification **    Please sign and return to [facility] - [address]  [phone] fax

## 2022-06-23 NOTE — PROGRESS NOTES
PT DAILY TREATMENT NOTE     Patient Name: Danica Check  Date:2022  : 1950  [x]  Patient  Verified  Payor: Iman Mcmanus / Plan: [place] & B / Product Type: Medicare /    In time:7:33  Out time:8:15  Total Treatment Time (min): 42   Visit #: 6 of 10    Medicare/BCBS Only   Total Timed Codes (min):  42 1:1 Treatment Time:  42       Treatment Area: Other low back pain [M54.59]  Right knee pain [M25.561]    SUBJECTIVE  Pain Level (0-10 scale): 4/10  Any medication changes, allergies to medications, adverse drug reactions, diagnosis change, or new procedure performed?: [x] No    [] Yes (see summary sheet for update)  Subjective functional status/changes:   [] No changes reported  \"I was sore and stiff after last session. I go back to the MD tomorrow for another gel shot. The MD said there's also been a fluid build up so he may need to drain the knee a little bit. \"    OBJECTIVE    10 min Therapeutic Exercise:  [] See flow sheet :   Rationale: increase ROM and increase strength to improve the patients ability to perform ADLs    32 min Therapeutic Activity:  []  See flow sheet :   Rationale: increase strength, improve coordination and improve balance  to improve the patients ability to stand for long periods of time. With   [x] TE   [x] TA   [] neuro   [] other: Patient Education: [x] Review HEP    [] Progressed/Changed HEP based on:   [] positioning   [] body mechanics   [] transfers   [] heat/ice application    [] other:      Other Objective/Functional Measures: Reassessed goals for re-certification note. Performed exercises per flow sheet with focus on lumbar mobility, right knee stability. Pain Level (0-10 scale) post treatment: 3/10    ASSESSMENT/Changes in Function: Pt has attended skilled therapy consistently for aquatic therapy and now land-based skilled therapy to address low back and right knee pain.   Pt reports exercises have improved mobility in low back and right knee.  Pt is able to ascend stairs reciprocally but doesn't feel as stable with descending stairs, as feels right knee might give way. Pt feels increased ease of moving around in general, but notes aching with static standing for prolonged periods. Pt notes stiffness with standing after prolonged sitting. Pt has started back trying to incorporate walking and biking program for exercise but is not able to perform consistently at this time. Pt would continue to benefit from skilled therapy on land to improve core/trunk, right LE stability to improve standing/amb tolerance without fatigue or pain and increase ease of stair negotiation     Patient will continue to benefit from skilled PT services to modify and progress therapeutic interventions, address functional mobility deficits, address ROM deficits, address strength deficits, analyze and address soft tissue restrictions, analyze and cue movement patterns, analyze and modify body mechanics/ergonomics and assess and modify postural abnormalities to attain remaining goals.      []  See Plan of Care  [x]  See progress note/recertification  []  See Discharge Summary         Progress towards goals / Updated goals:  Goal: Pt to be compliant with initial HEP to improve lumbar mobility, core/hip strength  Status at last note/certification:  progressing: pt reports doing HEP a couple times a week (5/31/22)  Current: progressing - Pt reports some compliance with updated HEP (6/23/22)  Goal: Pt to increase standing tolerance to 30 minutes without fatigue in low back or right knee for ease of chores  Status at last note/certification: progressing: 10-15 minutes   Current: progressing - standing tolerance 15 minutes prior to fatigue in low back, right knee (6/23/22)   Goal: Pt to negoiate 4 steps with reciprocal pattern and 1 UE support without deviation for ease of household mobility  Status at last note/certification:  progressing: Pt able to descend with a reciprocal gait pattern, with some instability due to lack of eccentric control (6/21/2022)   Current: not met - still descends stairs with reciprocal pattern, feeling of instability with 1 UE on rail, the other on the side wall (6/23/22)  Goal: Pt to increase bilateral hip strength to 4+/5 grossly to increase ease of household mobility and yard work.   Status at last note/certification: not met: Right psoas: 4/5, quads: 4/5, glute med (sitting): 4/5, hamstrings: 4-/5 (5/31/22)  Current: progressing - hip Flex 4+/5, hip ABD 4/5, hip Ext 4-/5, Hamstrings 5/5, hip ER/IR 5/5 (6/23/22)  Goal: Pt to report FOTO score of 65 to show improved function and quality of life.   Status at last note/certification: progressing: FOTO 56 pts (5/31/22)  Current: not met - FOTO 54 pts - no functional deficits noted (6/23/22)    PLAN  [x]  Upgrade activities as tolerated     [x]  Continue plan of care  []  Update interventions per flow sheet       []  Discharge due to:_  []  Other:_      Suzette Erickson, PT 6/23/2022  7:32 AM    Future Appointments   Date Time Provider Kristi Lakhani   6/24/2022  9:10 AM Lynda Burkett MD [facility] BS AMB   7/6/2022  9:30 AM Camila Morel MD [facility] BS AMB   8/2/2022  8:40 AM Gabi Hogue MD [facility] BS AMB   1/13/2023  9:00 AM [facility] ULTRASOUND [facility] NOEMY [address]   1/13/2023  9:40 AM Chantelle Hayward PA-C [address] [facility]

## 2022-06-24 ENCOUNTER — OFFICE VISIT (OUTPATIENT)
Dept: ORTHOPEDIC SURGERY | Age: 72
End: 2022-06-24
Payer: MEDICARE

## 2022-06-24 VITALS
TEMPERATURE: 97.7 F | HEART RATE: 97 BPM | OXYGEN SATURATION: 97 % | HEIGHT: 69 IN | BODY MASS INDEX: 35.76 KG/M2 | WEIGHT: 241.4 LBS

## 2022-06-24 DIAGNOSIS — M25.461 EFFUSION OF RIGHT KNEE: ICD-10-CM

## 2022-06-24 DIAGNOSIS — M25.561 CHRONIC PAIN OF RIGHT KNEE: ICD-10-CM

## 2022-06-24 DIAGNOSIS — M17.11 PRIMARY OSTEOARTHRITIS OF RIGHT KNEE: Primary | ICD-10-CM

## 2022-06-24 DIAGNOSIS — G89.29 CHRONIC PAIN OF RIGHT KNEE: ICD-10-CM

## 2022-06-24 PROCEDURE — 20610 DRAIN/INJ JOINT/BURSA W/O US: CPT | Performed by: SPECIALIST

## 2022-06-24 NOTE — PROGRESS NOTES
Patient: Allan Cui                MRN: [id number]       SSN: [id number]  YOB: 1950        AGE: 66 y.o. SEX: male  Body mass index is 35.65 kg/m². PCP: Malena Steven MD  06/24/22    CC: RIGHT KNEE PAIN AND SWELLING    Chief Complaint   Patient presents with    Knee Pain     rt     HISTORY:  Allan Cui is a 66 y.o. male who is seen for right knee pain. TIME OUT performed immediately prior to start of procedure:  Xiao Snider MD, have performed the following reviews on Allan Cui prior to the start of the procedure:            * Patient was identified by name and date of birth   * Agreement on procedure being performed was verified  * Risks and Benefits explained to the patient  * Procedure site verified and marked as necessary  * Patient was positioned for comfort  * Consent was obtained     Time: 9:35 AM    Date of procedure: 6/24/2022    Procedure performed by:  Lonni Hatchet, MD    Mr. Alice Vigil tolerated the procedure well with no complications    No diagnosis found. PLAN:  After discussing treatment options, patient's right knee was injected with 2 cc of Euflexxa. Mr. Alice Vigil will follow up PRN now that he has completed his visco supplementation injection series. After timeout and under sterile conditions, right knee aspirated 35 cc of light yellow fluid. The fluid was discarded.        Scribed by Eric Sadler ([facility]) as dictated by Lonni Hatchet, MD

## 2022-07-06 ENCOUNTER — OFFICE VISIT (OUTPATIENT)
Dept: ORTHOPEDIC SURGERY | Age: 72
End: 2022-07-06
Payer: MEDICARE

## 2022-07-06 VITALS
TEMPERATURE: 97.1 F | OXYGEN SATURATION: 96 % | HEIGHT: 69 IN | HEART RATE: 75 BPM | WEIGHT: 242 LBS | BODY MASS INDEX: 35.84 KG/M2

## 2022-07-06 DIAGNOSIS — M47.816 LUMBAR SPONDYLOSIS: Primary | ICD-10-CM

## 2022-07-06 DIAGNOSIS — M79.18 GLUTEAL PAIN: ICD-10-CM

## 2022-07-06 DIAGNOSIS — M70.51 PES ANSERINUS BURSITIS OF RIGHT KNEE: ICD-10-CM

## 2022-07-06 DIAGNOSIS — M51.36 DDD (DEGENERATIVE DISC DISEASE), LUMBAR: ICD-10-CM

## 2022-07-06 DIAGNOSIS — M47.816 LUMBAR SPONDYLOSIS: ICD-10-CM

## 2022-07-06 PROCEDURE — 1101F PT FALLS ASSESS-DOCD LE1/YR: CPT | Performed by: PHYSICAL MEDICINE & REHABILITATION

## 2022-07-06 PROCEDURE — G8756 NO BP MEASURE DOC: HCPCS | Performed by: PHYSICAL MEDICINE & REHABILITATION

## 2022-07-06 PROCEDURE — G8417 CALC BMI ABV UP PARAM F/U: HCPCS | Performed by: PHYSICAL MEDICINE & REHABILITATION

## 2022-07-06 PROCEDURE — 3017F COLORECTAL CA SCREEN DOC REV: CPT | Performed by: PHYSICAL MEDICINE & REHABILITATION

## 2022-07-06 PROCEDURE — G8536 NO DOC ELDER MAL SCRN: HCPCS | Performed by: PHYSICAL MEDICINE & REHABILITATION

## 2022-07-06 PROCEDURE — G8427 DOCREV CUR MEDS BY ELIG CLIN: HCPCS | Performed by: PHYSICAL MEDICINE & REHABILITATION

## 2022-07-06 PROCEDURE — 1123F ACP DISCUSS/DSCN MKR DOCD: CPT | Performed by: PHYSICAL MEDICINE & REHABILITATION

## 2022-07-06 PROCEDURE — 99204 OFFICE O/P NEW MOD 45 MIN: CPT | Performed by: PHYSICAL MEDICINE & REHABILITATION

## 2022-07-06 PROCEDURE — G8432 DEP SCR NOT DOC, RNG: HCPCS | Performed by: PHYSICAL MEDICINE & REHABILITATION

## 2022-07-06 RX ORDER — CYCLOBENZAPRINE HCL 5 MG
5 TABLET ORAL
Qty: 45 TABLET | Refills: 1 | Status: SHIPPED | OUTPATIENT
Start: 2022-07-06

## 2022-07-06 NOTE — PROGRESS NOTES
Denise Goff presents today for   Chief Complaint   Patient presents with    Back Pain     lower       Is someone accompanying this pt? no    Is the patient using any DME equipment during OV? no    Depression Screening:  3 most recent PHQ Screens 2/1/2022   Little interest or pleasure in doing things Not at all   Feeling down, depressed, irritable, or hopeless Not at all   Total Score PHQ 2 0       Learning Assessment:  Learning Assessment 2/1/2022   PRIMARY LEARNER Patient   PRIMARY LANGUAGE ENGLISH   LEARNER PREFERENCE PRIMARY DEMONSTRATION   ANSWERED BY patient   RELATIONSHIP SELF       Abuse Screening:  Abuse Screening Questionnaire 2/1/2022   Do you ever feel afraid of your partner? N   Are you in a relationship with someone who physically or mentally threatens you? N   Is it safe for you to go home? Y       Fall Risk  Fall Risk Assessment, last 12 mths 2/1/2022   Able to walk? Yes   Fall in past 12 months? 0   Do you feel unsteady? 0   Are you worried about falling 0         Coordination of Care:  1. Have you been to the ER, urgent care clinic since your last visit? Yes, pt went to urgent care at the [facility] for his back. Hospitalized since your last visit? no    2. Have you seen or consulted any other health care providers outside of the [address] [place] [address] since your last visit? Yes, ortho and pcp. Include any pap smears or colon screening.  no

## 2022-07-06 NOTE — PROGRESS NOTES
[address]  [address], [address]  [address]  Phone: [phone]  Fax: [phone]        Trini Dukes  : 1950  PCP: Nicolasa Donato MD    NEW PATIENT EVALUATION      ASSESSMENT AND PLAN    Diagnoses and all orders for this visit:    1. Lumbar spondylosis  -     cyclobenzaprine (FLEXERIL) 5 mg tablet; Take 1 Tablet by mouth two (2) times daily as needed for Muscle Spasm(s). -     MRI LUMB SPINE WO CONT; Future    2. DDD (degenerative disc disease), lumbar  -     cyclobenzaprine (FLEXERIL) 5 mg tablet; Take 1 Tablet by mouth two (2) times daily as needed for Muscle Spasm(s). -     MRI LUMB SPINE WO CONT; Future    3. Gluteal pain  -     MRI LUMB SPINE WO CONT; Future    4. Pes anserinus bursitis of right knee         1. 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 He has had modest benefit with physical therapy. He needs a MRI for persistent pain and possible injections. 2. Avoid repetitive bending, lifting, and twisting. 3. Avoid lifting objects greater than 20 pounds  4. Continue PT  5. Given instructions on lumbar exercises. Perform as tolerated. 6. Advised to continue HEP as tolerated. 7. Continue Tylenol Arthritis PRN  8. Rx of Flexeril PRN for spasms  9. L MRI for 3 months increasing low back pain, failed PT, unable to take NSAIDs      Follow-up and Dispositions    · Return for MRI/CT fu. HISTORY OF PRESENT ILLNESS  Rose Green is seen today at the request of Dr. Justyn Juárez in consultation for back pain x several months. He reports intermittent back pain radiating into his right lateral thigh, exacerbated with bending. He notes increased stiffness with sitting, subsides with movement. He notes initial pain last year while bending to  an object off the ground. Pt states his pain has been manageable until recently.  Denies numbness or tingling. He also complains of increased knee pain after falling on the plane, he was unable to walk off the plane. He went to Dr. Radha Cordova following the fall and received knee injection with benefit. He also went to see Dr. Radha Cordova for his back, received TPI with benefit. Denies side effects. He currently takes Tylenol Arthritis with some benefit. He given Flexeril in the past, helped some. Pt is currently in physical therapy for his back. He is compliant with his HEP. Denies persistent fevers, chills, weight changes, saddle paresthesias, and neurogenic bowel or bladder symptoms. Pain Assessment  7/6/2022   Location of Pain Back   Location Modifiers -   Severity of Pain 0   Quality of Pain Aching;Dull   Quality of Pain Comment -   Duration of Pain Other (Comment)   Duration of Pain Comment pt could not provide any info   Frequency of Pain Several days a week   Date Pain First Started -   Date Pain First Started Comment -   Aggravating Factors Bending   Limiting Behavior Yes   Relieving Factors Other (Comment)   Relieving Factors Comment tylenol arthritis, moving around   Result of Injury -   Work-Related Injury -   Type of Injury Other (Comment)   Type of Injury Comment pt bent over and fell while traveling       Onset of pain: 4/2022, injury, fall      Investigations:   L XR 10/2021 (CR): FA, degenerative changes  Spine surgery consult: none    Treatments:  Physical therapy: 6/2022  Spinal injections: no  Spinal surgery- no  Beneficial medications: Tylenol Arthritis, Flexeril  Failed medications: no NSAIDs    Work Status: Retired  at [employer]. Pertinent PMHx:  HTN, A. Fib, HLD, intestinal obstruction requiring colostomy and reversible, cardiac ablation, Euflexxa right knee, RAMYA with BiPAP. Missed trip to hospitals due to back/knee pain (2022).     Visit Vitals  Pulse 75   Temp 97.1 °F (36.2 °C) (Tympanic)   Ht 5' 9\" (1.753 m)   Wt 242 lb (109.8 kg)   SpO2 96% Comment: NOHELIA   BMI 35.74 kg/m² PHYSICAL EXAM    Unable to do tandem gait   Intact right leg single stance toe rise  Unable to do single leg toe rise on the left due to hip pain  TTP right L4-5  Tender right PAS anserine bursa  LE strength intact  No edema  DTRs hypoactive BLE      Past Medical History:   Diagnosis Date    Cardiac echocardiogram 01/08/2016    EF 60%. No WMA. Mod LVH. Gr 2 DDfx. Mild WALE.  Cardiac Holter monitor 02/12/2016    Mildly abnormal 48-hr Holter. Normal sinus rhythm w/a few short bursts of atrial fibrillation from 20-40 beats up to 170 bpm.  No symptoms reported.  Cardiac nuclear imaging study, normal 01/08/2016    Low risk. No ischemia or prior infarction. No RWMA. EF 63%. Neg EKG on pharm stress test.    Cardiac transesophageal echocardiography (SONDRA) 11/08/2016    EF 55%. No WMA. LVH. Mild LAE. No LA appendage thrombus. Mod WALE.  Hypercholesterolemia     Hypertension     RAMYA treated with BiPAP     Typical atrial flutter ([address] 12/19/2015    atrial flutter with 2 to1 block at a rate of 135 on EKG        Past Surgical History:   Procedure Laterality Date    HX CATARACT REMOVAL      HX COLOSTOMY TAKE DOWN N/A 2019    took 10 inches of colon out because of a blockage     HX HEMORRHOIDECTOMY      HX HEMORRHOIDECTOMY      TN CARDIAC SURG PROCEDURE UNLIST      ablation  11/16         Current Outpatient Medications   Medication Sig Dispense Refill    ascorbic acid (VITAMIN C PO) Take 1 Tablet by mouth daily. Pt is not sure of the dose      cyclobenzaprine (FLEXERIL) 5 mg tablet Take 1 Tablet by mouth two (2) times daily as needed for Muscle Spasm(s). 45 Tablet 1    rosuvastatin (CRESTOR) 20 mg tablet TAKE 1 TABLET BY MOUTH 90 Tablet 3    metoprolol succinate (TOPROL-XL) 50 mg XL tablet TAKE 1 TABLET BY MOUTH DAILY 90 Tablet 3    fluticasone propionate (FLONASE) 50 mcg/actuation nasal spray 2 Sprays by Both Nostrils route daily as needed.       azelastine (ASTELIN) 137 mcg (0.1 %) nasal spray 1 Spray as needed for Rhinitis. Use in each nostril as directed      fish oil-dha-epa 1,200-144-216 mg cap Take 1 Capsule by mouth daily.  folic acid/multivit-min/lutein (CENTRUM SILVER PO) Take 1 Tablet by mouth daily.  montelukast (SINGULAIR) 10 mg tablet Take 10 mg by mouth daily.  loratadine (CLARITIN) 10 mg tablet Take 10 mg by mouth daily as needed.

## 2022-07-06 NOTE — LETTER
7/6/2022    Patient: Flora Garay   YOB: 1950   Date of Visit: 7/6/2022     Doc Frey MD  [address]  [address]  [address]  Via Fax: [phone]     Charbel Tan MD  [address] [facility]    Dear MD Charbel Mukherjee MD,      Thank you for referring Mr. Flora Garay to [address] [place] [address] for evaluation. My notes for this consultation are attached. If you have questions, please do not hesitate to call me. I look forward to following your patient along with you.       Sincerely,    Cheyenne Padgett MD

## 2022-07-06 NOTE — PATIENT INSTRUCTIONS
Low Back Arthritis: Exercises  Introduction  Here are some examples of typical rehabilitation exercises for your condition. Start each exercise slowly. Ease off the exercise if you start to have pain. Your doctor or physical therapist will tell you when you can start these exercises and which ones will work best for you. When you are not being active, find a comfortable position for rest. Some people are comfortable on the floor or a medium-firm bed with a small pillow under their head and another under their knees. Some people prefer to lie on their side with a pillow between their knees. Don't stay in one position for too long. Take short walks (10 to 20 minutes) every 2 to 3 hours. Avoid slopes, hills, and stairs until you feel better. Walk only distances you can manage without pain, especially leg pain. How to do the exercises  Pelvic tilt    1. Lie on your back with your knees bent. 2. \"Brace\" your stomachtighten your muscles by pulling in and imagining your belly button moving toward your spine. 3. Press your lower back into the floor. You should feel your hips and pelvis rock back. 4. Hold for 6 seconds while breathing smoothly. 5. Relax and allow your pelvis and hips to rock forward. 6. Repeat 8 to 12 times. Back stretches    1. Get down on your hands and knees on the floor. 2. Relax your head and allow it to droop. Round your back up toward the ceiling until you feel a nice stretch in your upper, middle, and lower back. Hold this stretch for as long as it feels comfortable, or about 15 to 30 seconds. 3. Return to the starting position with a flat back while you are on your hands and knees. 4. Let your back sway by pressing your stomach toward the floor. Lift your buttocks toward the ceiling. 5. Hold this position for 15 to 30 seconds. 6. Repeat 2 to 4 times. Follow-up care is a key part of your treatment and safety.  Be sure to make and go to all appointments, and call your doctor if you are having problems. It's also a good idea to know your test results and keep a list of the medicines you take. Where can you learn more? Go to http://www.[employer].com/  Enter T094 in the search box to learn more about \"Low Back Arthritis: Exercises. \"  Current as of: July 1, 2021               Content Version: 13.2  © 2006-2022 [employer]. Care instructions adapted under license by [employer] (which disclaims liability or warranty for this information). If you have questions about a medical condition or this instruction, always ask your healthcare professional. [place] [address] any warranty or liability for your use of this information.

## 2022-07-07 ENCOUNTER — HOSPITAL ENCOUNTER (OUTPATIENT)
Dept: PHYSICAL THERAPY | Age: 72
Discharge: HOME | End: 2022-07-07
Attending: SPECIALIST
Payer: MEDICARE

## 2022-07-07 PROCEDURE — 97112 NEUROMUSCULAR REEDUCATION: CPT

## 2022-07-07 PROCEDURE — 97110 THERAPEUTIC EXERCISES: CPT

## 2022-07-07 NOTE — PROGRESS NOTES
PT DAILY TREATMENT NOTE     Patient Name: Taina Jaffe  Date:2022  : 1950  [x]  Patient  Verified  Payor: Adela Schaffer / Plan: [place] MEDICARE PART A & B / Product Type: Medicare /    In time:9:05  Out time:9:45  Total Treatment Time (min): 40  Visit #: 1 of 8    Medicare/BCBS Only   Total Timed Codes (min):  40 1:1 Treatment Time:  40       Treatment Area: Other low back pain [M54.59]  Right knee pain [M25.561]    SUBJECTIVE  Pain Level (0-10 scale): 4-5/10  Any medication changes, allergies to medications, adverse drug reactions, diagnosis change, or new procedure performed?: [x] No    [] Yes (see summary sheet for update)  Subjective functional status/changes:   [] No changes reported  \"I'm stiff today. I saw Dr. Rashid Landaverde yesterday and she gave me the same exercises I received here. She is setting me up for an MRI. I go back to see her on 22. \"    OBJECTIVE    15 min Therapeutic Exercise:  [] See flow sheet :   Rationale: increase ROM and increase strength to improve the patients ability to increase right knee mobility, LE strength and stability for improved standing/amb tolerance     25 min Neuromuscular Re-education:  []  See flow sheet :   Rationale: increase strength, improve coordination and improve balance  to improve the patients ability to increase stability, safety with gait in home, community without falls          With   [] TE   [] TA   [] neuro   [] other: Patient Education: [x] Review HEP    [] Progressed/Changed HEP based on:   [] positioning   [] body mechanics   [] transfers   [] heat/ice application    [] other:      Other Objective/Functional Measures: Resumed exercises per flow sheet. Pain Level (0-10 scale) post treatment: 4/10    ASSESSMENT/Changes in Function: Pt's session focused on LE strength and standing balance activities to improve stability and safety with gait in home and community.   Pt noted posterior knee pain throughout standing activities and exhibited noticeable antalgic limp. Pt able to complete program without increase in pain levels. Pt would benefit from continued skilled therapy to address above deficits and improve ADL function with less pain. Patient will continue to benefit from skilled PT services to address functional mobility deficits, address ROM deficits, address strength deficits, analyze and address soft tissue restrictions, analyze and cue movement patterns, analyze and modify body mechanics/ergonomics, assess and modify postural abnormalities, address imbalance/dizziness and instruct in home and community integration to attain remaining goals. []  See Plan of Care  []  See progress note/recertification  []  See Discharge Summary         Progress towards goals / Updated goals:  Goal: Pt to be compliant with initial HEP to improve lumbar mobility, core/hip strength  Status at last note/certification: Pt reports some compliance with updated HEP (6/23/22)  Current:   Goal: Pt to increase standing tolerance to 30 minutes without fatigue in low back or right knee for ease of chores  Status at last note/certification: standing tolerance 15 minutes prior to fatigue in low back, right knee (6/23/22)   Current:   Goal: Pt to negoiate 4 steps with reciprocal pattern and 1 UE support without deviation for ease of household mobility  Status at last note/certification: still descends stairs with reciprocal pattern, feeling of instability with 1 UE on rail, the other on the side wall (6/23/22)  Current:   Goal: Pt to increase bilateral hip strength to 4+/5 grossly to increase ease of household mobility and yard work.   Status at last note/certification:  hip Flex 4+/5, hip ABD 4/5, hip Ext 4-/5, Hamstrings 5/5, hip ER/IR 5/5 (6/23/22)  Current:   Goal: Pt to report FOTO score of 65 to show improved function and quality of life.   Status at last note/certification: FOTO 54 pts - no functional deficits noted (6/23/22)  Current:     PLAN  [x]  Upgrade activities as tolerated     [x]  Continue plan of care  []  Update interventions per flow sheet       []  Discharge due to:_  []  Other:_      Rey Erickson, PT 7/7/2022  9:09 AM    Future Appointments   Date Time Provider Kristi Lakhani   8/2/2022  8:40 AM Obinna Jensen MD [facility] BS AMB   8/11/2022 10:45 AM Jasper Guerra MD [facility] BS AMB   1/13/2023  9:00 AM [facility] [address]   1/13/2023  9:40 AM Deni Hayward PA-C [place]

## 2022-07-12 ENCOUNTER — HOSPITAL ENCOUNTER (OUTPATIENT)
Dept: PHYSICAL THERAPY | Age: 72
Discharge: HOME | End: 2022-07-12
Attending: SPECIALIST
Payer: MEDICARE

## 2022-07-12 PROCEDURE — 97530 THERAPEUTIC ACTIVITIES: CPT

## 2022-07-12 PROCEDURE — 97112 NEUROMUSCULAR REEDUCATION: CPT

## 2022-07-12 NOTE — PROGRESS NOTES
PT DAILY TREATMENT NOTE     Patient Name: Mary Angulo  Date:2022  : 1950  [x]  Patient  Verified  Payor: Bart Keep / Plan: [place] & B / Product Type: Medicare /    In time:9:05  Out time:9:45  Total Treatment Time (min): 40  Visit #: 2 of 8    Medicare/BCBS Only   Total Timed Codes (min):  40 1:1 Treatment Time:  40       Treatment Area: Other low back pain [M54.59]  Right knee pain [M25.561]    SUBJECTIVE  Pain Level (0-10 scale): 1  Any medication changes, allergies to medications, adverse drug reactions, diagnosis change, or new procedure performed?: [x] No    [] Yes (see summary sheet for update)  Subjective functional status/changes:   [] No changes reported  I'm feeling a little stiff in my lower back. OBJECTIVE    30 min Therapeutic Activity:  []  See flow sheet :   Rationale: increase strength, improve coordination and improve balance  to improve the patients ability to negotiate stairs. 10 min Neuromuscular Re-education:  []  See flow sheet :   Rationale: improve coordination, improve balance and increase proprioception  to improve the patients ability to maintain balance in SLS. With   [x] TE   [x] TA   [] neuro   [] other: Patient Education: [x] Review HEP    [] Progressed/Changed HEP based on:   [] positioning   [] body mechanics   [] transfers   [] heat/ice application    [] other:      Other Objective/Functional Measures: exercises per chart. Pain Level (0-10 scale) post treatment: 3    ASSESSMENT/Changes in Function: Pt reports to skill therapy session with decreased ROM/ strength in the right knee and lower back. Session was tolerated well with only slight increases in pain in the right knee. Pt states he is improving but, \" balancing is an issue\". He is currently set to receive an MRI on the  on his lower back, due to intermittent increases in lower back pain.  Verbal cues were provided during hip 3 ways to engage the gluteus medius and improve standing endurance. Minor trunk sway noted during tandem stance but no LOB experienced. Continued therapy recommended to address functional deficits. Patient will continue to benefit from skilled PT services to modify and progress therapeutic interventions, address functional mobility deficits, address ROM deficits, address strength deficits, analyze and address soft tissue restrictions, analyze and cue movement patterns, analyze and modify body mechanics/ergonomics and assess and modify postural abnormalities to attain remaining goals. [x]  See Plan of Care  []  See progress note/recertification  []  See Discharge Summary         Progress towards goals / Updated goals:  Goal: Pt to be compliant with initial HEP to improve lumbar mobility, core/hip strength  Status at last note/certification: Pt reports some compliance with updated HEP (6/23/22)  Current:   Goal: Pt to increase standing tolerance to 30 minutes without fatigue in low back or right knee for ease of chores  Status at last note/certification: standing tolerance 15 minutes prior to fatigue in low back, right knee (6/23/22)   Current:   Goal: Pt to negoiate 4 steps with reciprocal pattern and 1 UE support without deviation for ease of household mobility  Status at last note/certification: still descends stairs with reciprocal pattern, feeling of instability with 1 UE on rail, the other on the side wall (6/23/22)  Current:   Goal: Pt to increase bilateral hip strength to 4+/5 grossly to increase ease of household mobility and yard work.   Status at last note/certification:  hip Flex 4+/5, hip ABD 4/5, hip Ext 4-/5, Hamstrings 5/5, hip ER/IR 5/5 (6/23/22)  Current:   Goal: Pt to report FOTO score of 65 to show improved function and quality of life.   Status at last note/certification: FOTO 54 pts - no functional deficits noted (6/23/22)  Current:     PLAN  [x]  Upgrade activities as tolerated     [x]  Continue plan of care  [] Update interventions per flow sheet       []  Discharge due to:_  []  Other:_      Flynn Bib, [facility] 7/12/2022  9:05 AM    Future Appointments   Date Time Provider Kristi Lakhani   7/14/2022  8:15 AM Casilda Hamilton [facility]   7/16/2022  2:30 PM HBV MRI RM 1 [facility]   7/19/2022  9:45 AM Casilda Hamilton [facility]   7/21/2022  8:15 AM Casilda Hamilton [facility]   7/26/2022  8:15 AM Xiomy Mansfield PTA [facility]   7/28/2022  8:15 AM Lewis Erickson, PT [facility]   8/2/2022  8:40 AM Gilbert Arisa MD [facility] BS AMB   8/11/2022 10:45 AM Ora Phillips MD [facility] BS AMB   1/13/2023  9:00 AM [facility] SCHED   1/13/2023  9:40 AM Heide Hayward, [address]

## 2022-07-14 ENCOUNTER — APPOINTMENT (OUTPATIENT)
Dept: PHYSICAL THERAPY | Age: 72
End: 2022-07-14
Attending: SPECIALIST
Payer: MEDICARE

## 2022-07-16 ENCOUNTER — HOSPITAL ENCOUNTER (OUTPATIENT)
Age: 72
Discharge: HOME | End: 2022-07-16
Attending: PHYSICAL MEDICINE & REHABILITATION
Payer: MEDICARE

## 2022-07-16 PROCEDURE — 72148 MRI LUMBAR SPINE W/O DYE: CPT

## 2022-07-19 ENCOUNTER — HOSPITAL ENCOUNTER (OUTPATIENT)
Dept: PHYSICAL THERAPY | Age: 72
Discharge: HOME | End: 2022-07-19
Attending: SPECIALIST
Payer: MEDICARE

## 2022-07-19 PROCEDURE — 97110 THERAPEUTIC EXERCISES: CPT

## 2022-07-19 PROCEDURE — 97530 THERAPEUTIC ACTIVITIES: CPT

## 2022-07-19 PROCEDURE — 97112 NEUROMUSCULAR REEDUCATION: CPT

## 2022-07-19 NOTE — PROGRESS NOTES
PT DAILY TREATMENT NOTE     Patient Name: Heather Mills  Date:2022  : 1950  [x]  Patient  Verified  Payor: Adela Blake / Plan: [place] MEDICARE PART A & B / Product Type: Medicare /    In time:9:45  Out time:10:30  Total Treatment Time (min): 45  Visit #: 3 of 8    Medicare/BCBS Only   Total Timed Codes (min):  45 1:1 Treatment Time:  45       Treatment Area: Other low back pain [M54.59]  Right knee pain [M25.561]    SUBJECTIVE  Pain Level (0-10 scale): 4-5  Any medication changes, allergies to medications, adverse drug reactions, diagnosis change, or new procedure performed?: [x] No    [] Yes (see summary sheet for update)  Subjective functional status/changes:   [] No changes reported  I've been doing some woodworking so I had to bend move around more  Which mad me back and knee stiff    OBJECTIVE      25 min Therapeutic Exercise:  [] See flow sheet :   Rationale: increase ROM and increase strength to improve the patients ability to improve ease of functional tasks, walking    8 min Therapeutic Activity:  []  See flow sheet :   Rationale: increase strength  to improve the patients ability to perform standing/walking tasks, yard work     12 min Neuromuscular Re-education:  []  See flow sheet :   Rationale: increase strength, improve coordination and improve balance  to improve the patients ability to improve stability and ease of stair negotiation          With   [] TE   [] TA   [] neuro   [] other: Patient Education: [x] Review HEP    [] Progressed/Changed HEP based on:   [] positioning   [] body mechanics   [] transfers   [] heat/ice application    [] other:      Other Objective/Functional Measures:   Functional Gains: slight increase in standing tolerance but onset of stiffness in knee.   Riding bike, walking for HEP  Deficits: inconsistent with reciprocal stair negotiation, pain along anterior aspect of knee     Pain Level (0-10 scale) post treatment: 3    ASSESSMENT/Changes in Function: patient has attended 14 sessions of skilled PT, including the evaluation, for Other low back pain [M54.59], Right knee pain [M25.561]. Walking tolerance varies daily depending on right knee pain, still about 10-15 minutes on average. Patient will continue to benefit from skilled PT services to modify and progress therapeutic interventions, address functional mobility deficits, address ROM deficits, address strength deficits, analyze and address soft tissue restrictions, analyze and cue movement patterns, analyze and modify body mechanics/ergonomics, assess and modify postural abnormalities, address imbalance/dizziness and instruct in home and community integration to attain remaining goals. []  See Plan of Care  []  See progress note/recertification  []  See Discharge Summary         Progress towards goals / Updated goals:  Goal: Pt to be compliant with initial HEP to improve lumbar mobility, core/hip strength  Status at last note/certification: Pt reports some compliance with updated HEP (6/23/22)  Current:   Goal: Pt to increase standing tolerance to 30 minutes without fatigue in low back or right knee for ease of chores  Status at last note/certification: standing tolerance 15 minutes prior to fatigue in low back, right knee (6/23/22)   Current: progressing: 10-15 minutes but slightly less fatigue, ,however varies.   Right knee more limiting due to pain  Goal: Pt to negoiate 4 steps with reciprocal pattern and 1 UE support without deviation for ease of household mobility  Status at last note/certification: still descends stairs with reciprocal pattern, feeling of instability with 1 UE on rail, the other on the side wall (6/23/22)  Current:   Goal: Pt to increase bilateral hip strength to 4+/5 grossly to increase ease of household mobility and yard work.   Status at last note/certification:  hip Flex 4+/5, hip ABD 4/5, hip Ext 4-/5, Hamstrings 5/5, hip ER/IR 5/5 (6/23/22)  Current:   Goal: Pt to report FOTO score of 65 to show improved function and quality of life.   Status at last note/certification: FOTO 54 pts - no functional deficits noted (6/23/22)  Current:     PLAN  [x]  Upgrade activities as tolerated     []  Continue plan of care  []  Update interventions per flow sheet       []  Discharge due to:_  [x]  Other:_  Will continue PT at least until MRI results are received Aug. 11    Sarah Rodney [facility] 7/19/2022  9:54 AM    Future Appointments   Date Time Provider Kristi Lakhani   7/21/2022  8:15 AM Nita Julian, PTA [facility]   7/26/2022  8:15 AM Nita Julian, PTA [facility]   7/28/2022  8:15 AM Minh Erickson, PT [facility]   8/2/2022  8:40 AM Flex Mcdaniels MD [facility] BS AMB   8/11/2022 10:45 AM Afshan Ahn MD [facility] BS AMB   1/13/2023  9:00 AM [facility] ULTRASOUND [facility] [address]   1/13/2023  9:40 AM Ko Hayward PA-C Ottie Coss

## 2022-07-21 ENCOUNTER — APPOINTMENT (OUTPATIENT)
Dept: PHYSICAL THERAPY | Age: 72
End: 2022-07-21
Attending: SPECIALIST
Payer: MEDICARE

## 2022-07-21 ENCOUNTER — TELEPHONE (OUTPATIENT)
Dept: PHYSICAL THERAPY | Age: 72
End: 2022-07-21

## 2022-07-26 ENCOUNTER — HOSPITAL ENCOUNTER (OUTPATIENT)
Dept: PHYSICAL THERAPY | Age: 72
Discharge: HOME | End: 2022-07-26
Attending: SPECIALIST
Payer: MEDICARE

## 2022-07-26 PROCEDURE — 97530 THERAPEUTIC ACTIVITIES: CPT

## 2022-07-26 PROCEDURE — 97112 NEUROMUSCULAR REEDUCATION: CPT

## 2022-07-26 NOTE — PROGRESS NOTES
PT DISCHARGE DAILY NOTE AND [id number]    Patient name: Alize Ordonez Start of Care: 22   Referral source: Mirta Engel MD : 1950   Medical/Treatment Diagnosis: Other low back pain [M54.59]  Right knee pain [M25.561] Onset Date:  22   Prior Hospitalization: see medical history Provider#: [id number]   Medications: Verified on Patient Summary List    Comorbidities: arthritis, Heart dx-afib, abdominal hernia  Prior Level of Function:enjoyed gardening, wood work, travel    Visits from Start of Care: 15    Missed Visits: 1    Reporting Period : 22 to 22    Date:2022  : 1950  [x]  Patient  Verified  Payor: [place] MEDICARE / Plan: Douglas Shea / Product Type: Medicare /    In time:8:15  Out time:9:00  Total Treatment Time (min): 45  Visit #: 4 of 8    Medicare/BCBS Only   Total Timed Codes (min):  45 1:1 Treatment Time:  45       SUBJECTIVE  Pain Level (0-10 scale): 0  Any medication changes, allergies to medications, adverse drug reactions, diagnosis change, or new procedure performed?: [x] No    [] Yes (see summary sheet for update)  Subjective functional status/changes:   [] No changes reported  I'm feeling a little more stiff in the lower back, but no pain     OBJECTIVE    25 min Therapeutic Activity:  []  See flow sheet :   Rationale: increase strength, improve coordination, and improve balance  to improve the patients ability to negotiate stairs. 20 min Neuromuscular Re-education:  []  See flow sheet :   Rationale: improve coordination, improve balance, and increase proprioception  to improve the patients ability to maintain balance in SLS. With   [x] TE   [x] TA   [] neuro   [] other: Patient Education: [x] Review HEP    [] Progressed/Changed HEP based on:   [] positioning   [] body mechanics   [] transfers   [] heat/ice application    [] other:      Other Objective/Functional Measures: exercises per chart.       Pain Level (0-10 scale) post treatment: 2    Summary of Care:    Goal: Pt to be compliant with initial HEP to improve lumbar mobility, core/hip strength  Status at last note/certification: Pt reports some compliance with updated HEP (6/23/22)  Current: MET: Pt is regularly performing his HEP. Goal: Pt to increase standing tolerance to 30 minutes without fatigue in low back or right knee for ease of chores  Status at last note/certification: standing tolerance 15 minutes prior to fatigue in low back, right knee (6/23/22)   Current: progressing: 10-15 minutes but slightly less fatigue, ,however varies. Right knee more limiting due to pain  Goal: Pt to negoiate 4 steps with reciprocal pattern and 1 UE support without deviation for ease of household mobility  Status at last note/certification: still descends stairs with reciprocal pattern, feeling of instability with 1 UE on rail, the other on the side wall (6/23/22)  Current: not met - inconsistent with reciprocal stair negotiation due to pain along anterior aspect of knee  Goal: Pt to increase bilateral hip strength to 4+/5 grossly to increase ease of household mobility and yard work. Status at last note/certification:  hip Flex 4+/5, hip ABD 4/5, hip Ext 4-/5, Hamstrings 5/5, hip ER/IR 5/5 (6/23/22)  Current: progressing: Hip flexion 5/5 bilateral. Hamstrings 5/5, hip IR/ER 5/5, Hip abduction L:4+/5 R: 4/5, Hip abduction: 4/5 bilateral.   Goal: Pt to report FOTO score of 65 to show improved function and quality of life. Status at last note/certification: FOTO 54 pts - no functional deficits noted (6/23/22)  Current: progressing - FOTO 55 pts    ASSESSMENT/Changes in Function: Patient has attended 15 sessions of skilled PT, including the evaluation, for Other low back pain [M54.59], Right knee pain [M25.561]. Walking tolerance varies daily depending on right knee pain, still about 10-15 minutes on average.  Functional Gains include slight increase in standing tolerance but with onset of stiffness in knee. Pt is has been riding his bike and walking for HEP. Functional deficits include being inconsistent with reciprocal stair negotiation, and utilizing a step to pattern during descent at times. Pt is now confident in his ability to manage his condition, and states, \" I can utilize the tools I was given in therapy. \" Progress has been made towards every goal.  Pt is appropriate for discharge at this time.           Thank you for this referral!      PLAN  [x]Discontinue therapy: [x]Patient has reached or is progressing toward set goals      []Patient is non-compliant or has abdicated      []Due to lack of appreciable progress towards set goals    Darby Erickson, PT 7/26/2022  8:19 AM

## 2022-07-28 ENCOUNTER — APPOINTMENT (OUTPATIENT)
Dept: PHYSICAL THERAPY | Age: 72
End: 2022-07-28
Attending: SPECIALIST
Payer: MEDICARE

## 2022-07-28 NOTE — PROGRESS NOTES
Physical Therapy Discharge Instructions      [facility], [address] [place] [address]  [phone] fax    Patient: Estefania Worrell  : 1950      Continue Home Exercise Program 5 times per day for 5 weeks, then decrease to 3 times per week      Continue with    [x] Ice  as needed 2 times per day     [] Heat           Follow up with MD:     [] Upon completion of therapy     [x] As needed      Recommendations:     [x]   Return to activity with home program    []   Return to activity with the following modifications:       []Post Rehab Program    []Join Independent aquatic program     []Return to/join local gym        Additional Comments: Best of luck!!!           [place] PTA 2022 9:15 AM

## 2022-08-11 ENCOUNTER — OFFICE VISIT (OUTPATIENT)
Dept: ORTHOPEDIC SURGERY | Age: 72
End: 2022-08-11
Payer: MEDICARE

## 2022-08-11 VITALS
HEIGHT: 69 IN | SYSTOLIC BLOOD PRESSURE: 121 MMHG | RESPIRATION RATE: 18 BRPM | TEMPERATURE: 97.4 F | HEART RATE: 72 BPM | BODY MASS INDEX: 35.55 KG/M2 | DIASTOLIC BLOOD PRESSURE: 75 MMHG | WEIGHT: 240 LBS | OXYGEN SATURATION: 96 %

## 2022-08-11 DIAGNOSIS — M48.061 LUMBAR STENOSIS WITHOUT NEUROGENIC CLAUDICATION: ICD-10-CM

## 2022-08-11 DIAGNOSIS — S39.012D STRAIN OF LUMBAR REGION, SUBSEQUENT ENCOUNTER: ICD-10-CM

## 2022-08-11 DIAGNOSIS — M47.816 LUMBAR SPONDYLOSIS: Primary | ICD-10-CM

## 2022-08-11 PROCEDURE — 1123F ACP DISCUSS/DSCN MKR DOCD: CPT | Performed by: PHYSICAL MEDICINE & REHABILITATION

## 2022-08-11 PROCEDURE — G8752 SYS BP LESS 140: HCPCS | Performed by: PHYSICAL MEDICINE & REHABILITATION

## 2022-08-11 PROCEDURE — G8427 DOCREV CUR MEDS BY ELIG CLIN: HCPCS | Performed by: PHYSICAL MEDICINE & REHABILITATION

## 2022-08-11 PROCEDURE — 99214 OFFICE O/P EST MOD 30 MIN: CPT | Performed by: PHYSICAL MEDICINE & REHABILITATION

## 2022-08-11 PROCEDURE — 3017F COLORECTAL CA SCREEN DOC REV: CPT | Performed by: PHYSICAL MEDICINE & REHABILITATION

## 2022-08-11 PROCEDURE — G8536 NO DOC ELDER MAL SCRN: HCPCS | Performed by: PHYSICAL MEDICINE & REHABILITATION

## 2022-08-11 PROCEDURE — 1101F PT FALLS ASSESS-DOCD LE1/YR: CPT | Performed by: PHYSICAL MEDICINE & REHABILITATION

## 2022-08-11 PROCEDURE — G8754 DIAS BP LESS 90: HCPCS | Performed by: PHYSICAL MEDICINE & REHABILITATION

## 2022-08-11 PROCEDURE — G8417 CALC BMI ABV UP PARAM F/U: HCPCS | Performed by: PHYSICAL MEDICINE & REHABILITATION

## 2022-08-11 PROCEDURE — G8432 DEP SCR NOT DOC, RNG: HCPCS | Performed by: PHYSICAL MEDICINE & REHABILITATION

## 2022-08-11 NOTE — PROGRESS NOTES
Briana Savage presents today for   Chief Complaint   Patient presents with    Back Pain       Is someone accompanying this pt? no    Is the patient using any DME equipment during OV? no    Depression Screening:  3 most recent PHQ Screens 2/1/2022   Little interest or pleasure in doing things Not at all   Feeling down, depressed, irritable, or hopeless Not at all   Total Score PHQ 2 0       Learning Assessment:  Learning Assessment 2/1/2022   PRIMARY LEARNER Patient   PRIMARY LANGUAGE ENGLISH   LEARNER PREFERENCE PRIMARY DEMONSTRATION   ANSWERED BY patient   RELATIONSHIP SELF       Abuse Screening:  Abuse Screening Questionnaire 2/1/2022   Do you ever feel afraid of your partner? N   Are you in a relationship with someone who physically or mentally threatens you? N   Is it safe for you to go home? Y       Fall Risk  Fall Risk Assessment, last 12 mths 8/11/2022   Able to walk? Yes   Fall in past 12 months? 0   Do you feel unsteady? 1   Are you worried about falling 0   Is the gait abnormal? 0       OPIOID RISK TOOL  No flowsheet data found. Coordination of Care:  1. Have you been to the ER, urgent care clinic since your last visit? Yes back pain  Hospitalized since your last visit? no    2. Have you seen or consulted any other health care providers outside of the [address] [place] [address] since your last visit? no Include any pap smears or colon screening.  no

## 2022-08-11 NOTE — LETTER
8/14/2022    Patient: Lucho Wade   YOB: 1950   Date of Visit: 8/11/2022     Corine Mena MD  [address] [place] [id number]  Via Fax: [phone]    Dear Corine Mena MD,      Thank you for referring Mr. Lucho Wade to [address] [place] [address] for evaluation. My notes for this consultation are attached. If you have questions, please do not hesitate to call me. I look forward to following your patient along with you.       Sincerely,    Arash Woodruff MD

## 2022-08-11 NOTE — PROGRESS NOTES
11/3/2021       RE: Lynn Thompson  [address] [facility] [address]     Dear Colleague,    Thank you for referring your patient, Lynn Thompson, to the [facility] [place] at [facility]. Please see a copy of my visit note below.    Lynn is a 57 year old who is being evaluated via a billable video visit.      How would you like to obtain your AVS? MyChart  If the video visit is dropped, the invitation should be resent by: Text to cell phone: [phone]  Will anyone else be joining your video visit? No      Video Start Time: 1:28 PM    Assessment & Plan     Rectal prolapse  She has bothersome rectal prolapse and would like to proceed with reconstructive repair with a rectopexy.  Given that she has poor apical support also seen on defecography she is seeing me to discuss a concomitant procedure.  - Adult Urology Referral    Uterovaginal prolapse, unspecified  She does not have a bothersome vaginal bulge but on colorectal exam she had a large rectocele and on defecography she had a enterocele and poor apical support.  We discussed that I still need to have her come in for an exam to assess myself but we discussed the surgical options    We discussed that pelvic organ prolapse is essentially a woman's hernia and that although bothersome, by itself is not life threatening.  I discussed that I would recommend a hysterectomy at the time of surgery to optimize the repair.  The abdominal approach which can be done open or minimally invasively (laparoscopic or robotic assisted) would remove the uterus but leave the cervix behind (supracervical hysterectomy) and perform a sacrocolpopexy using mesh adjunct to the sacral promontory to support the top of the vagina or we could use sutures to support the top of the vagina to some strong ligaments    We discussed that when there is poor apical support she is more likely to have a failure of  [address], [address]  [place], [address] [place] [address] Street  Phone: [phone]  Fax: [phone]        Martinez Potter  : 1950  PCP: Yvonne Lawrence MD    PROGRESS NOTE      ASSESSMENT AND PLAN    Diagnoses and all orders for this visit:    1. Lumbar spondylosis    2. Strain of lumbar region, subsequent encounter    3. Lumbar stenosis without neurogenic claudication      Zahraa Ernandez is a 66 y.o. male with chronic intermittent low back pain, overall he is doing better. .   Briefly discussed RFA and GORDON if needed in future  Continue OTC Tylenol and Flexeril PRN  Advised to continue HEP as tolerated. Pt given handicap placard    Follow-up and Dispositions    Return if symptoms worsen or fail to improve. HISTORY OF PRESENT ILLNESS      Zahraa Ernandez is a 66 y.o. male presents for follow up of back pain. LV continue PT. MRI reviewed with patient. He reports manageable low back pain that intermittently radiates into his right hip. Pt states his pain is increased with bending. He notes a slight increase in pain after lifting a boat battery. Pt has increased stiffness with sitting, states he has difficulty exiting vehicles after long car rides. Pt goes on bike rides for exercises. Denies insomnia due to pain. He takes Tylenol-Arthritis PRN with benefit. Denies side effects. Pt uses Flexeril PRN but has not needed it recently.     Pain Assessment  2022   Location of Pain Back   Location Modifiers -   Severity of Pain 2   Quality of Pain Dull;Aching   Quality of Pain Comment -   Duration of Pain Persistent   Duration of Pain Comment -   Frequency of Pain Constant   Date Pain First Started -   Date Pain First Started Comment -   Aggravating Factors Bending   Limiting Behavior Yes   Relieving Factors Ice;Rest   Relieving Factors Comment -   Result of Injury No   Work-Related Injury -   Type of Injury -   Type of Injury Comment - Onset of pain: 4/2022, injury, fall        Investigations:   L MRI 7/2022: mild stenosis L4-5, DDD L5-S1, FA L4-5  L XR 10/2021 (CR): FA, degenerative changes  Spine surgery consult: none     Treatments:  Physical therapy: 6-7/2022  Spinal injections: no  Spinal surgery- no  Beneficial medications: Tylenol Arthritis, Flexeril  Failed medications: no NSAIDs     Work Status: Retired  at [employer]. Pertinent PMHx:  HTN, A. Fib, HLD, intestinal obstruction requiring colostomy and reversible, cardiac ablation, Euflexxa right knee, RAMYA with BiPAP. Missed trip to [place] due to back/knee pain (2022).        PHYSICAL EXAMINATION    Visit Vitals  /75 (BP 1 Location: Right arm, BP Patient Position: Sitting, BP Cuff Size: Adult)   Pulse 72   Temp 97.4 °F (36.3 °C) (Temporal)   Resp 18   Ht 5' 9\" (1.753 m)   Wt 240 lb (108.9 kg)   SpO2 96% Comment: RA   BMI 35.44 kg/m²       TTP L4-5  LE strength intact  SLR negative                Written by Ron Aaron, as dictated by Laurell Closs, MD. her rectopexy.  We discussed that usually we would do a concomitant mesh based sacrocolpopexy but that given her prior crohn's disease we may want to consider non mesh based.  We also discussed that given her multiple prior surgeries and pancreatectomy with transplant that she is at much higher risk for poor wound healing and complications.  We could consider an apical suspension but again that would not be as durable.    We discussed that even though I would not recommend a concomitant midurethral sling at the same time, the recommendation if we are doing an open abdominal sacrocolpopexy is to do a concomitant pina colpossuspension to try to prevent stress incontinence afterwards.    Given the fact patient is post-menopausal we also discussed ovarian preservation versus prophylactic oophorectomy at the time of surgery provided that her ovaries appeared normal.  We discussed the fact that for sometime after menopause the ovaries are thought to produce some benefit for cardiovascular and bone health, but until when is unclear.  We discussed the risks of preservation to include further surgery for ovarian abnormalities and small risk of ovarian cancer.  We also discussed the current ACOG recommendations to at minimum to a bilateral salpingectomy. She wishes to have a BSO as long as feasible      We discussed that the risks to the procedure include but not limited to cardiovascular risks of anesthesia, nerve injury, bleeding, infection, injury to adjacent organs including bowel, bladder, and blood vessels, conversion to open procedure, de madison or worsening stress incontinence or urge incontinence, need for post-operative keen catheter, need for further procedures including for mesh extrusion or erosion.  Patient expressed understanding and agreeable to proceed.    Urinary urgency  Discussed that this is not likely to be changed by the surgery and she may need some pelvic floor therapy afterwards    Gastric bypass  status for obesity/Post-pancreatectomy diabetes (H)/S/P exploratory laparotomy  All of these place her at higher surgical risk and we discussed for this reason we would not be attempting a minimally invasive surgery but would recommend open approach. We also discussed that if Dr Sheridan ultimately needed to do a colostomy that day that we may not decide to do any uterovaginal prolapse repair    Plan for Abdominal supracervical hysterectomy (removal of uterus and leaving cervix behind), bilateral salpingo-oophorectomy (removal the fallopian tubes and ovaries), open sacrocolpopexy, pina colposuspension, cystoscopy    RTC for in person exam    50 minutes were spent today in reviewing Dr Sheridan's notes from colorectal, reviewing the pap smear result, the HPV results, direct patient care including surgical counseling, coordination of care and documentation    Nicole Rivera MD MPH  (she/her/hers)   of Urology  [facility]      Tyesha Bailey is a 57 year old who presents for the following health issues     HPI     She is sent to see me today by Dr Sheridan    First noted rectal prolapse about 2 years ago.  Has gotten worse to the point she is concerned at times that she wasn't sure she would be able to reduce it.    Has urinary urgency incontinence, getting a worse over the last several months.  Reports that she has asymptomatic bacteriuria.  Denies gross hematuria.  Denies vaginal bleeding.  Has dyspareunia secondary to dryness, has estrogen cream written for but does not remember to use it.    Does have a history of kidney stones.  Saw Dr Baez and Dr Aldana in the past    Pancreatectomy and auto islet transplant.  Also a couple surgeries for SBO.  Has history of Crohn's and had resection of ileum without any issues.    Denies history of abnormal pap smears.  Last one in Epic is in 2019 with Dr Hilliard, normal pap and negative HR HPV.    She fosters dogs.    Past Medical  History:   Diagnosis Date     Benign paroxysmal positional vertigo     occ.      Calculus of kidney 05/2005    x1 on L side passed, several stones.  Has been tested for oxalate.     Chronic abdominal pain 2013     Chronic pain      Chronic pancreatitis 2013     Depression     also occ panic spells     Depressive disorder      Diabetes (H) 5/10/2013    Total Pancreatomy with Auto Islets Transplant     Dyspepsia 1999    H. pylori   treated     Headaches     still periodic HA's ;  often 5X/week     Hypertension 2016    Stress related     Iron deficiency anemia secondary to inadequate dietary iron intake 2003    relates to gastric bypass     Post-pancreatectomy diabetes melltius 2013     Sleep apnea     uses splint     Spasm of sphincter of Oddi     surgical + endoscopic stenting of pancreatic duct @ [facility] 06     Thyroid nodule 2016     Vaccination not carried out      Past Surgical History:   Procedure Laterality Date     ABDOMINOPLASTY  2002    Tummy tuck     APPENDECTOMY  1990     BUNIONECTOMY Right 1998     CBD Stent placement  2002    CBD stent; Dr. Presley      SECTION       CHOLECYSTECTOMY       COLONOSCOPY   &     colonoscopy     COLONOSCOPY       COLONOSCOPY N/A 3/31/2021    Procedure: COLONOSCOPY INCOMPLETE Aborted due to incomplete prep  will need to take additional prep and return tomorrow 21;  Surgeon: Ihsan Saenz MD;  Location: [facility]     COMBINED CYSTOSCOPY, RETROGRADES, URETEROSCOPY, LASER HOLMIUM LITHOTRIPSY URETER(S), INSERT STENT Right 2015    Procedure: COMBINED CYSTOSCOPY, RETROGRADES, URETEROSCOPY, LASER HOLMIUM LITHOTRIPSY URETER(S), INSERT STENT;  Surgeon: Kennedi Aldana MD;  Location: [facility] OR     COMBINED CYSTOSCOPY, RETROGRADES, URETEROSCOPY, LASER HOLMIUM LITHOTRIPSY URETER(S), INSERT STENT Right 2015    Procedure: COMBINED CYSTOSCOPY, RETROGRADES, URETEROSCOPY, LASER HOLMIUM LITHOTRIPSY  URETER(S), INSERT STENT;  Surgeon: Kennedi Aldana MD;  Location: [facility] OR     COSMETIC SURGERY  6/24/2002    Tummy tuck     CYSTECTOMY OVARIAN BENIGN Right      CYSTOSCOPY, RETROGRADES, INSERT STENT URETER(S), COMBINED  10/02/2012    Procedure: COMBINED CYSTOSCOPY, RETROGRADES, INSERT STENT URETER(S);  COMBINED CYSTOSCOPY,  , INSERT LEFT STENT URETER;  Surgeon: Johny Baez MD;  Location: [facility] OR     ESOPHAGOSCOPY, GASTROSCOPY, DUODENOSCOPY (EGD), COMBINED N/A 01/24/2018    Procedure: COMBINED ESOPHAGOSCOPY, GASTROSCOPY, DUODENOSCOPY (EGD);  ESOPHAGOSCOPY, GASTROSCOPY, DUODENOSCOPY (EGD)    ;  Surgeon: Tamir Rodgers MD;  Location: [facility]     EXTRACORPOREAL SHOCK WAVE LITHOTRIPSY (ESWL)  10/16/2012    Procedure: EXTRACORPOREAL SHOCK WAVE LITHOTRIPSY (ESWL);  left EXTRACORPOREAL SHOCK WAVE LITHOTRIPSY (ESWL) ;  Surgeon: Johny Baez MD;  Location: [facility] OR     Gastric bypass NOS       HERNIA REPAIR  02/2015     IRRIGATION AND DEBRIDEMENT HAND, COMBINED Left 10/30/2020    Procedure: Left hand sharp excisional debridement of skin, subcutaneous tissue and fat with a scalpel, 2 x 1 x 1 cm.;  Surgeon: Demian Renteria MD;  Location: [facility] OR     LAP, LYSIS OF ADHESIONS       LAPAROSCOPIC LYSIS ADHESIONS N/A 02/20/2015    Procedure: LAPAROSCOPIC LYSIS ADHESIONS;  Surgeon: Aaron Early MD;  [facility]     LAPAROSCOPIC LYSIS ADHESIONS N/A 12/29/2015    Procedure: LAPAROSCOPIC LYSIS ADHESIONS;  Surgeon: Aaron Early MD;  [facility]     PANCREATECTOMY, TRANSPLANT AUTO ISLET CELL, COMBINED  05/10/2013    Procedure: COMBINED PANCREATECTOMY, TRANSPLANT AUTO ISLET CELL;  Pancreatectomy, Auto Islet Cell Transplant   hernia repair, jejunostomy tube and liver biopsies with Anesthesia General with block;  Surgeon: Aaron Early MD;  [facility]     Partial ileum resection  1992     REPAIR PTOSIS BROW BILATERAL Bilateral 06/09/2020    Procedure: BILATERAL BROW PTOSIS REPAIR;  Surgeon: Aristeo  Denise Curiel MD;  Location:  OR     Surgery for SBO  2015     TONSILLECTOMY, ADENOIDECTOMY, COMBINED  1997     TRANSPLANT  5/10/13    Pancreatic Auto-Islet Transplant        Social Connections:      Frequency of Communication with Friends and Family:      Frequency of Social Gatherings with Friends and Family:      Attends Taoism Services:      Active Member of Clubs or Organizations:      Attends Club or Organization Meetings:      Marital Status:      Social History     Tobacco Use     Smoking status: Never Smoker     Smokeless tobacco: Never Used   Substance Use Topics     Alcohol use: Not Currently     Alcohol/week: 0.0 standard drinks     Drug use: Not Currently       Current Outpatient Medications:      amylase-lipase-protease (VIOKACE) [address]2497-30361 units TABS tablet, Take 4-5 with meals and 2 with snacks, Disp: 500 tablet, Rfl: 11     calcium carbonate 600 mg-vitamin D 400 units (CALTRATE) 600-400 MG-UNIT per tablet, Take 1 tablet by mouth daily, Disp: , Rfl:      Continuous Blood Gluc  (DEXCOM G6 ) MODESTA, Use as directed to check blood glucose levels, Disp: 1 each, Rfl: 1     Continuous Blood Gluc Sensor (DEXCOM G6 SENSOR) MISC, Change every 10 days, Disp: 3 each, Rfl: 11     Continuous Blood Gluc Transmit (DEXCOM G6 TRANSMITTER) MISC, Change every 3 months, Disp: 1 each, Rfl: 3     DULoxetine (CYMBALTA) 20 MG capsule, Take 20 mg by mouth daily, Disp: , Rfl:      escitalopram (LEXAPRO) 20 MG tablet, Take 20 mg by mouth daily, Disp: , Rfl:      estradiol (ESTRACE) 0.1 MG/GM vaginal cream, PLACE 2 GRAMS VAGINALLY 2 TIMES WEEKLY, Disp: 42.5 g, Rfl: 1     famotidine (PEPCID) 40 MG tablet, Take 1 tablet (40 mg) by mouth 2 times daily as needed for heartburn, Disp: 180 tablet, Rfl: 3     FIASP PENFILL 100 UNIT/ML SOCT, Inject 0.5-4 Units Subcutaneous 4 times daily (with meals and nightly), Disp: 15 mL, Rfl: 5     gabapentin (NEURONTIN) 300 MG capsule, Take 300 mg by mouth nightly as needed,  Disp: , Rfl:      Glucagon (GVOKE HYPOPEN 1-PACK) 1 MG/0.2ML SOAJ, Inject 1 mg Subcutaneous once as needed (for hypoglycemia with loss of consciousness), Disp: 15 mL, Rfl: 5     glucose 40 % GEL, Take 15-30 g by mouth every 15 minutes as needed., Disp: 1 Tube, Rfl: 11     hydrOXYzine (ATARAX) 25 MG tablet, TAKE 1-2 TABLETS BY MOUTH 2 TIMES DAILY AS NEEDED FOR ANXIETY, Disp: , Rfl: 0     insulin aspart (NOVOLOG PENFILL) 100 UNIT/ML cartridge, Inject 0.5-2 units subcutaneously 4 times daily with meals and at bedtime, Disp: 15 mL, Rfl: 11     insulin glargine (LANTUS PEN) 100 UNIT/ML pen, Inject 6 units daily, Disp: 15 mL, Rfl: 3     levothyroxine (SYNTHROID/LEVOTHROID) 100 MCG tablet, Take 1 tablet (100 mcg) by mouth daily, Disp: 90 tablet, Rfl: 3     loratadine (CLARITIN) 10 MG tablet, Take 10 mg by mouth daily as needed , Disp: , Rfl:      metroNIDAZOLE (FLAGYL) 500 MG tablet, Take 1 tablet (500 mg) by mouth every 6 hours At 8:00 am, 2:00 pm, 8:00 pm the day prior to your surgery with neomycin and zofran., Disp: 3 tablet, Rfl: 0     modafinil (PROVIGIL) 200 MG tablet, Take 400 mg by mouth daily, Disp: , Rfl:      neomycin (MYCIFRADIN) 500 MG tablet, Take 2 tablets (1,000 mg) by mouth every 6 hours At 8:00 am, 2:00 pm, 8:00 pm the day prior to your surgery with flagyl and zofran., Disp: 6 tablet, Rfl: 0     Nutritional Supplements (BOOST HIGH PROTEIN) LIQD, After above baseline labs are drawn, give: 6 mL/kg to maximum of 360 mL; the beverage is to be consumed within 5 minutes., Disp: , Rfl: 0     omeprazole (PRILOSEC) 40 MG DR capsule, Take 1 capsule (40 mg) by mouth 2 times daily Take 30-60 minutes before a meal., Disp: 180 capsule, Rfl: 3     ondansetron (ZOFRAN) 4 MG tablet, Take 1 tablet (4 mg) by mouth every 6 hours At 8:00 am, 2:00 pm, 8:00 pm the day prior to your surgery with neomycin and flagyl., Disp: 3 tablet, Rfl: 0     ondansetron (ZOFRAN-ODT) 8 MG ODT tab, Take 8 mg by mouth 3 times daily as needed for  "nausea, Disp: , Rfl:      polyethylene glycol (MIRALAX) 17 g packet, Take 238 g by mouth See Admin Instructions Starting at 4 pm night prior to surgery. Refer to \"Getting Ready for Surgery\" instructions., Disp: 14 packet, Rfl: 0     STATIN NOT PRESCRIBED (INTENTIONAL), Please choose reason not prescribed from choices below., Disp:  , Rfl:      traZODone (DESYREL) 50 MG tablet, Take 50 mg by mouth nightly as needed for sleep, Disp: , Rfl:      Allergies   Allergen Reactions     Corticosteroids Other (See Comments)     All oral, IV and injectable steroids are contraindicated (unless in life threatening situations) in Islet Auto transplant recipients. They can cause irreversible loss of islet cell function. Please contact patient's transplant care coordinator, Erlinda Multani RN BSN at [phone]/pager: [phone] and endocrinologist prior to administration.       Povidone Iodine Hives     Causes skin to blister     Naproxen      Other reaction(s): Abdominal Pain  Pt allergic to Naprosyn     Nsaids      naprosyn = GI upset     Povidone Iodine      blisters     Sulfasalazine Nausea and Nausea and Vomiting         Objective         Vitals:  No vitals were obtained today due to virtual visit.    Physical Exam   GENERAL: Healthy, alert and no distress  EYES: Eyes grossly normal to inspection.  No discharge or erythema, or obvious scleral/conjunctival abnormalities.  RESP: No audible wheeze, cough, or visible cyanosis.  No visible retractions or increased work of breathing.    SKIN: Visible skin clear. No significant rash, abnormal pigmentation or lesions.  NEURO: Cranial nerves grossly intact.  Mentation and speech appropriate for age.  PSYCH: Mentation appears normal, affect normal/bright, judgement and insight intact, normal speech and appearance well-groomed.    Video-Visit Details    Type of service:  Video Visit    Video End Time:1:51 PM    Originating Location (pt. Location): Home    Distant Location (provider " location):  [facility]OLOG[facility] [place]     Platform used for Video Visit: MandiWell

## 2022-08-14 PROBLEM — M47.816 LUMBAR SPONDYLOSIS: Status: ACTIVE | Noted: 2022-08-14

## 2022-08-14 PROBLEM — M48.061 LUMBAR STENOSIS WITHOUT NEUROGENIC CLAUDICATION: Status: ACTIVE | Noted: 2022-08-14

## 2022-09-20 ENCOUNTER — OFFICE VISIT (OUTPATIENT)
Dept: CARDIOLOGY CLINIC | Age: 72
End: 2022-09-20
Payer: MEDICARE

## 2022-09-20 VITALS
SYSTOLIC BLOOD PRESSURE: 132 MMHG | WEIGHT: 239 LBS | DIASTOLIC BLOOD PRESSURE: 76 MMHG | HEIGHT: 69 IN | OXYGEN SATURATION: 97 % | HEART RATE: 85 BPM | BODY MASS INDEX: 35.4 KG/M2

## 2022-09-20 DIAGNOSIS — I10 ESSENTIAL HYPERTENSION: ICD-10-CM

## 2022-09-20 DIAGNOSIS — I48.0 PAF (PAROXYSMAL ATRIAL FIBRILLATION) (HCC): Primary | ICD-10-CM

## 2022-09-20 DIAGNOSIS — I10 PRIMARY HYPERTENSION: ICD-10-CM

## 2022-09-20 PROCEDURE — 93000 ELECTROCARDIOGRAM COMPLETE: CPT | Performed by: INTERNAL MEDICINE

## 2022-09-20 PROCEDURE — 3017F COLORECTAL CA SCREEN DOC REV: CPT | Performed by: INTERNAL MEDICINE

## 2022-09-20 PROCEDURE — G8752 SYS BP LESS 140: HCPCS | Performed by: INTERNAL MEDICINE

## 2022-09-20 PROCEDURE — G8427 DOCREV CUR MEDS BY ELIG CLIN: HCPCS | Performed by: INTERNAL MEDICINE

## 2022-09-20 PROCEDURE — G8754 DIAS BP LESS 90: HCPCS | Performed by: INTERNAL MEDICINE

## 2022-09-20 PROCEDURE — G8432 DEP SCR NOT DOC, RNG: HCPCS | Performed by: INTERNAL MEDICINE

## 2022-09-20 PROCEDURE — 99214 OFFICE O/P EST MOD 30 MIN: CPT | Performed by: INTERNAL MEDICINE

## 2022-09-20 PROCEDURE — 1123F ACP DISCUSS/DSCN MKR DOCD: CPT | Performed by: INTERNAL MEDICINE

## 2022-09-20 PROCEDURE — G8536 NO DOC ELDER MAL SCRN: HCPCS | Performed by: INTERNAL MEDICINE

## 2022-09-20 PROCEDURE — 1101F PT FALLS ASSESS-DOCD LE1/YR: CPT | Performed by: INTERNAL MEDICINE

## 2022-09-20 PROCEDURE — G8417 CALC BMI ABV UP PARAM F/U: HCPCS | Performed by: INTERNAL MEDICINE

## 2022-09-20 NOTE — PROGRESS NOTES
Mary Angulo presents today for   Chief Complaint   Patient presents with    Follow-up     6 months        Mary Angulo preferred language for health care discussion is english/other. Is someone accompanying this pt? no    Is the patient using any DME equipment during [address]? no    Depression Screening:  3 most recent PHQ Screens 2/1/2022   Little interest or pleasure in doing things Not at all   Feeling down, depressed, irritable, or hopeless Not at all   Total Score PHQ 2 0       Learning Assessment:  Learning Assessment 2/1/2022   PRIMARY LEARNER Patient   PRIMARY LANGUAGE ENGLISH   LEARNER PREFERENCE PRIMARY DEMONSTRATION   ANSWERED BY patient   RELATIONSHIP SELF       Abuse Screening:  Abuse Screening Questionnaire 2/1/2022   Do you ever feel afraid of your partner? N   Are you in a relationship with someone who physically or mentally threatens you? N   Is it safe for you to go home? Y       Fall Risk  Fall Risk Assessment, last 12 mths 8/11/2022   Able to walk? Yes   Fall in past 12 months? 0   Do you feel unsteady? 1   Are you worried about falling 0   Is the gait abnormal? 0       Pt currently taking Anticoagulant therapy? no    Coordination of Care:  1. Have you been to the ER, urgent care clinic since your last visit? Hospitalized since your last visit? no    2. Have you seen or consulted any other health care providers outside of the [address] [place] [address] since your last visit? Include any pap smears or colon screening.  no

## 2022-09-20 NOTE — LETTER
9/20/2022 12:03 PM    Patient:  Mirtha Siddiqui   YOB: 1950  Date of Visit: 9/20/2022       Dear Dr Neymar Capellan was seen in our office on 9/20/2022 for cardiac evaluation. From a cardiac standpoint he is cleared for procedure. Please feel free to contact our office if you have any questions regarding this patient.              Sincerely,      Char Hernandez MD

## 2022-09-23 ENCOUNTER — TELEPHONE (OUTPATIENT)
Dept: CARDIOLOGY CLINIC | Age: 72
End: 2022-09-23

## 2022-09-23 NOTE — TELEPHONE ENCOUNTER
9/20/2022  Request for surgical clearance letter and EKG. Patient sts needs it to be faxed to Dr Tran Lay office at [phone]. Faxed form confirmation received.

## 2022-09-28 NOTE — PROGRESS NOTES
Chester Frausto is in the office today for cardiovascular evaluation of his paroxysmal atrial fibrillation. He has a history of hypertension, hypercholesterolemia and a family history of atrial fibrillation with an older brother having had an atrial fibrillation ablation. His mother also had atrial fibrillation. In December 2015  he  was found to be in atrial flutter with 2:1 block with a heart rate of 135-140. He was given IV Lopressor and ultimately converted  to sinus rhythm. He continued to have recurrent problems with paroxysms of atrial fibrillation and ultimately had pulmonary vein isolation and ablation using [address] in November 2016. He was initially on amiodarone for a few months which was later discontinued. In the office today, he reports that he recently had abdominal surgery. He has had no chest pain. He does experience dyspnea with exertion. He has been trying to ride his bicycle as well as walk. He has lost 13 pounds. He reports most of his shortness of breath occurs with fast movement. He has had no peripheral swelling. He has had no palpitations. He is primarily limited by his knee arthritis. He has had no dizziness. Patient also reports that he is in preoperative mode for upcoming hernia surgery. Plan; Chester Frausto continues to be stable from a cardiac standpoint. He has had no chest pain suggestive of a cardiac ischemic etiology. He has had no prolonged palpitations, dizziness, near syncope or syncope. He has had no symptoms of decompensated heart failure. He is still exercising regularly although less than in the past.  He is on metoprolol succinate 50 mg daily. He is most limited by his knee arthritis. There are no cardiac contraindications to proposed surgical procedure    He continues on Crestor 20 mg daily. His last lipid profile is presently not available    His blood pressure in the office today is 122/88.   We will plan to see him back in 6 months. PCP: Michelle Allen MD       Past Medical History:   Diagnosis Date    Cardiac echocardiogram 01/08/2016    EF 60%. No WMA. Mod LVH. Gr 2 DDfx. Mild WALE. Cardiac Holter monitor 02/12/2016    Mildly abnormal 48-hr Holter. Normal sinus rhythm w/a few short bursts of atrial fibrillation from 20-40 beats up to 170 bpm.  No symptoms reported. Cardiac nuclear imaging study, normal 01/08/2016    Low risk. No ischemia or prior infarction. No RWMA. EF 63%. Neg EKG on pharm stress test.    Cardiac transesophageal echocardiography (SONDRA) 11/08/2016    EF 55%. No WMA. LVH. Mild LAE. No LA appendage thrombus. Mod WALE. Hypercholesterolemia     Hypertension     RAMYA treated with BiPAP     Typical atrial flutter ([address] 12/19/2015    atrial flutter with 2 to1 block at a rate of 135 on EKG       Past Surgical History:   Procedure Laterality Date    HX CATARACT REMOVAL      HX COLOSTOMY TAKE DOWN N/A 2019    took 10 inches of colon out because of a blockage     HX HEMORRHOIDECTOMY      HX HEMORRHOIDECTOMY      [place] CARDIAC SURG PROCEDURE UNLIST      ablation  11/16       Current Outpatient Medications   Medication Sig    ascorbic acid (VITAMIN C PO) Take 1 Tablet by mouth daily. Pt is not sure of the dose    cyclobenzaprine (FLEXERIL) 5 mg tablet Take 1 Tablet by mouth two (2) times daily as needed for Muscle Spasm(s). rosuvastatin (CRESTOR) 20 mg tablet TAKE 1 TABLET BY MOUTH    metoprolol succinate (TOPROL-XL) 50 mg XL tablet TAKE 1 TABLET BY MOUTH DAILY    fish oil-dha-epa 1,200-144-216 mg cap Take 1 Capsule by mouth daily. folic acid/multivit-min/lutein (CENTRUM SILVER PO) Take 1 Tablet by mouth daily. montelukast (SINGULAIR) 10 mg tablet Take 10 mg by mouth daily. loratadine (CLARITIN) 10 mg tablet Take 10 mg by mouth daily as needed. No current facility-administered medications for this visit.          Allergies   Allergen Reactions    Lipitor [Atorvastatin] Myalgia       Social History :  Social History     Tobacco Use    Smoking status: Never    Smokeless tobacco: Never   Substance Use Topics    Alcohol use: Yes        Family History: family history includes Alzheimer's Disease in his father; Cancer in his mother; Colon Cancer in his mother; Colon Polyps in his brother and sister; Heart Disease in his brother, father, and mother. Review of Systems:   Constitutional: Negative. Respiratory: Negative. Cardiovascular: . Negative for chest pain, palpitations and leg swelling. Gastrointestinal: Negative. Musculoskeletal: Negative. Neurological: Negative for dizziness. Physical Exam:    The patient is a cooperative and alert  Visit Vitals  /76 (BP 1 Location: Left upper arm, BP Patient Position: Sitting, BP Cuff Size: Adult)   Pulse 85   Ht 5' 9\" (1.753 m)   Wt 108.4 kg (239 lb)   SpO2 97%   BMI 35.29 kg/m²       HEENT: Conjuctiva white and mucosa moist.  NECK: Supple without masses, tenderness or thyromegaly. There was no jugular venous distention. Carotids are without bruits. CHEST: Symmetrical with good excursion. LUNGS: Clear to auscultation in all fields. HEART: Regular rate and rhythm. There is a normal S1 and variable S2. No murmurs, rubs, clicks, or gallops auscultated. ABDOMEN: Soft without masses, tenderness or organomegaly. EXTREMITIES: Full peripheral pulses with trace peripheral edema. Review of Data: See PMH and Cardiology and Imaging sections for cardiac testing      Results for orders placed or performed in visit on 09/20/2022. AMB POC EKG ROUTINE W/ 12 LEADS, INTER & REP     Status: None    Narrative    Normal sinus rhythm with rate of 72. Incomplete right bundle branch block. No change compared to prior tracing             Savannah Lujan MD, F.A.C.C. [facility]  [facility]  [address] [place] [address].   [address]    PLEASE NOTE:  This document has been produced using voice recognition software. Unrecognized errors in transcription may be present.

## 2022-10-31 PROBLEM — K43.2 INCISIONAL HERNIA: Status: ACTIVE | Noted: 2022-10-31

## 2023-01-13 PROBLEM — Z00.00 GENERAL MEDICAL EXAM: Status: ACTIVE | Noted: 2023-01-13

## 2023-01-20 RX ORDER — METOPROLOL SUCCINATE 50 MG/1
TABLET, EXTENDED RELEASE ORAL
Qty: 90 TABLET | Refills: 3 | Status: SHIPPED | OUTPATIENT
Start: 2023-01-20

## 2023-01-20 RX ORDER — ROSUVASTATIN CALCIUM 20 MG/1
TABLET, COATED ORAL
Qty: 90 TABLET | Refills: 3 | Status: SHIPPED | OUTPATIENT
Start: 2023-01-20

## 2023-02-12 PROBLEM — Z00.00 GENERAL MEDICAL EXAM: Status: RESOLVED | Noted: 2023-01-13 | Resolved: 2023-02-12

## 2023-03-29 ENCOUNTER — OFFICE VISIT (OUTPATIENT)
Age: 73
End: 2023-03-29

## 2023-03-29 VITALS
DIASTOLIC BLOOD PRESSURE: 76 MMHG | BODY MASS INDEX: 35.25 KG/M2 | WEIGHT: 238 LBS | SYSTOLIC BLOOD PRESSURE: 128 MMHG | OXYGEN SATURATION: 97 % | HEIGHT: 69 IN | HEART RATE: 83 BPM

## 2023-03-29 DIAGNOSIS — I48.0 PAROXYSMAL ATRIAL FIBRILLATION (HCC): Primary | ICD-10-CM

## 2023-03-29 RX ORDER — ASCORBIC ACID 100 MG
1 TABLET,CHEWABLE ORAL DAILY
COMMUNITY

## 2023-03-29 ASSESSMENT — PATIENT HEALTH QUESTIONNAIRE - PHQ9
SUM OF ALL RESPONSES TO PHQ QUESTIONS 1-9: 0
SUM OF ALL RESPONSES TO PHQ QUESTIONS 1-9: 0
1. LITTLE INTEREST OR PLEASURE IN DOING THINGS: 0
SUM OF ALL RESPONSES TO PHQ QUESTIONS 1-9: 0
SUM OF ALL RESPONSES TO PHQ QUESTIONS 1-9: 0
2. FEELING DOWN, DEPRESSED OR HOPELESS: 0
SUM OF ALL RESPONSES TO PHQ9 QUESTIONS 1 & 2: 0

## 2023-03-29 ASSESSMENT — ANXIETY QUESTIONNAIRES
GAD7 TOTAL SCORE: 0
4. TROUBLE RELAXING: 0
1. FEELING NERVOUS, ANXIOUS, OR ON EDGE: 0
5. BEING SO RESTLESS THAT IT IS HARD TO SIT STILL: 0
2. NOT BEING ABLE TO STOP OR CONTROL WORRYING: 0
6. BECOMING EASILY ANNOYED OR IRRITABLE: 0
7. FEELING AFRAID AS IF SOMETHING AWFUL MIGHT HAPPEN: 0
3. WORRYING TOO MUCH ABOUT DIFFERENT THINGS: 0

## 2023-04-02 NOTE — PROGRESS NOTES
Yaima Albanian presents today for   Chief Complaint   Patient presents with    Follow-up     6 month       Lugenia Albanian preferred language for health care discussion is english/other. Is someone accompanying this pt? no    Is the patient using any DME equipment during OV? no    Depression Screening:  Depression: Not at risk    PHQ-2 Score: 0        Learning Assessment:  Who is the primary learner? Patient    What is the preferred language for health care of the primary learner? ENGLISH    How does the primary learner prefer to learn new concepts? DEMONSTRATION    Answered By patient    Relationship to Learner SELF           Pt currently taking Anticoagulant therapy? no    Pt currently taking Antiplatelet therapy ? no      Coordination of Care:  1. Have you been to the ER, urgent care clinic since your last visit? Hospitalized since your last visit? no    2. Have you seen or consulted any other health care providers outside of the [address] [place] [address] since your last visit? Include any pap smears or colon screening.  no
thrombus. Mod TIEN. Hypercholesterolemia     Hypertension     YOAN treated with BiPAP     Typical atrial flutter ([address] 12/19/2015    atrial flutter with 2 to1 block at a rate of 135 on EKG       Past Surgical History:   Procedure Laterality Date    HX CATARACT REMOVAL      HX COLOSTOMY TAKE DOWN N/A 2019    took 10 inches of colon out because of a blockage     HX HEMORRHOIDECTOMY      HX HEMORRHOIDECTOMY      [facility] CARDIAC SURG PROCEDURE UNLIST      ablation  11/16       Current Outpatient Medications   Medication Sig    ascorbic acid (VITAMIN C PO) Take 1 Tablet by mouth daily. Pt is not sure of the dose    cyclobenzaprine (FLEXERIL) 5 mg tablet Take 1 Tablet by mouth two (2) times daily as needed for Muscle Spasm(s). rosuvastatin (CRESTOR) 20 mg tablet TAKE 1 TABLET BY MOUTH    metoprolol succinate (TOPROL-XL) 50 mg XL tablet TAKE 1 TABLET BY MOUTH DAILY    fish oil-dha-epa 1,200-144-216 mg cap Take 1 Capsule by mouth daily. folic acid/multivit-min/lutein (CENTRUM SILVER PO) Take 1 Tablet by mouth daily. montelukast (SINGULAIR) 10 mg tablet Take 10 mg by mouth daily. loratadine (CLARITIN) 10 mg tablet Take 10 mg by mouth daily as needed. No current facility-administered medications for this visit. Allergies   Allergen Reactions    Lipitor [Atorvastatin] Myalgia       Social History :  Social History     Tobacco Use    Smoking status: Never    Smokeless tobacco: Never   Substance Use Topics    Alcohol use: Yes        Family History: family history includes Alzheimer's Disease in his father; Cancer in his mother; Colon Cancer in his mother; Colon Polyps in his brother and sister; Heart Disease in his brother, father, and mother. Review of Systems:   Constitutional: Negative. Respiratory: Negative. Cardiovascular: . Negative for chest pain, palpitations and leg swelling. Gastrointestinal: Negative. Musculoskeletal: Negative. Neurological: Negative for dizziness.

## 2023-07-10 ENCOUNTER — HOSPITAL ENCOUNTER (OUTPATIENT)
Facility: HOSPITAL | Age: 73
Discharge: HOME | End: 2023-07-13
Attending: PHYSICAL MEDICINE & REHABILITATION
Payer: MEDICARE

## 2023-07-10 DIAGNOSIS — M47.816 LUMBAR FACET ARTHROPATHY: ICD-10-CM

## 2023-07-10 DIAGNOSIS — M51.36 DDD (DEGENERATIVE DISC DISEASE), LUMBAR: ICD-10-CM

## 2023-07-10 DIAGNOSIS — M48.061 SPINAL STENOSIS, LUMBAR REGION WITHOUT NEUROGENIC CLAUDICATION: ICD-10-CM

## 2023-07-10 PROCEDURE — 72148 MRI LUMBAR SPINE W/O DYE: CPT

## 2023-07-18 ENCOUNTER — APPOINTMENT (OUTPATIENT)
Facility: HOSPITAL | Age: 73
End: 2023-07-18
Payer: MEDICARE

## 2023-07-18 ENCOUNTER — HOSPITAL ENCOUNTER (EMERGENCY)
Facility: HOSPITAL | Age: 73
Discharge: HOME | End: 2023-07-18
Attending: STUDENT IN AN ORGANIZED HEALTH CARE EDUCATION/TRAINING PROGRAM
Payer: MEDICARE

## 2023-07-18 VITALS
HEIGHT: 69 IN | SYSTOLIC BLOOD PRESSURE: 129 MMHG | DIASTOLIC BLOOD PRESSURE: 75 MMHG | HEART RATE: 88 BPM | RESPIRATION RATE: 16 BRPM | WEIGHT: 230 LBS | BODY MASS INDEX: 34.07 KG/M2 | OXYGEN SATURATION: 98 % | TEMPERATURE: 97.8 F

## 2023-07-18 DIAGNOSIS — M48.56XA NONTRAUMATIC COMPRESSION FRACTURE OF L2 VERTEBRA, INITIAL ENCOUNTER (HCC): Primary | ICD-10-CM

## 2023-07-18 DIAGNOSIS — R33.9 URINARY RETENTION: ICD-10-CM

## 2023-07-18 LAB
ALBUMIN SERPL-MCNC: 3.8 G/DL (ref 3.4–5)
ALBUMIN/GLOB SERPL: 0.8 (ref 0.8–1.7)
ALP SERPL-CCNC: 114 U/L (ref 45–117)
ALT SERPL-CCNC: 44 U/L (ref 16–61)
ANION GAP SERPL CALC-SCNC: 7 MMOL/L (ref 3–18)
APPEARANCE UR: CLEAR
AST SERPL-CCNC: 29 U/L (ref 10–38)
BASOPHILS # BLD: 0 K/UL (ref 0–0.1)
BASOPHILS NFR BLD: 0 % (ref 0–2)
BILIRUB SERPL-MCNC: 0.6 MG/DL (ref 0.2–1)
BILIRUB UR QL: NEGATIVE
BUN SERPL-MCNC: 20 MG/DL (ref 7–18)
BUN/CREAT SERPL: 21 (ref 12–20)
CALCIUM SERPL-MCNC: 9.6 MG/DL (ref 8.5–10.1)
CHLORIDE SERPL-SCNC: 110 MMOL/L (ref 100–111)
CO2 SERPL-SCNC: 23 MMOL/L (ref 21–32)
COLOR UR: YELLOW
CREAT SERPL-MCNC: 0.94 MG/DL (ref 0.6–1.3)
DIFFERENTIAL METHOD BLD: ABNORMAL
EOSINOPHIL # BLD: 0 K/UL (ref 0–0.4)
EOSINOPHIL NFR BLD: 0 % (ref 0–5)
ERYTHROCYTE [DISTWIDTH] IN BLOOD BY AUTOMATED COUNT: 16.3 % (ref 11.6–14.5)
GLOBULIN SER CALC-MCNC: 4.7 G/DL (ref 2–4)
GLUCOSE SERPL-MCNC: 106 MG/DL (ref 74–99)
GLUCOSE UR STRIP.AUTO-MCNC: NEGATIVE MG/DL
HCT VFR BLD AUTO: 30.4 % (ref 36–48)
HGB BLD-MCNC: 10.4 G/DL (ref 13–16)
HGB UR QL STRIP: NEGATIVE
IMM GRANULOCYTES # BLD AUTO: 0.1 K/UL (ref 0–0.04)
IMM GRANULOCYTES NFR BLD AUTO: 1 % (ref 0–0.5)
KETONES UR QL STRIP.AUTO: NEGATIVE MG/DL
LEUKOCYTE ESTERASE UR QL STRIP.AUTO: NEGATIVE
LYMPHOCYTES # BLD: 0.9 K/UL (ref 0.9–3.6)
LYMPHOCYTES NFR BLD: 19 % (ref 21–52)
MCH RBC QN AUTO: 34.3 PG (ref 24–34)
MCHC RBC AUTO-ENTMCNC: 34.2 G/DL (ref 31–37)
MCV RBC AUTO: 100.3 FL (ref 78–100)
MONOCYTES # BLD: 0.3 K/UL (ref 0.05–1.2)
MONOCYTES NFR BLD: 6 % (ref 3–10)
NEUTS SEG # BLD: 3.2 K/UL (ref 1.8–8)
NEUTS SEG NFR BLD: 73 % (ref 40–73)
NITRITE UR QL STRIP.AUTO: NEGATIVE
NRBC # BLD: 0 K/UL (ref 0–0.01)
NRBC BLD-RTO: 0 PER 100 WBC
PH UR STRIP: 6.5 (ref 5–8)
PLATELET # BLD AUTO: 215 K/UL (ref 135–420)
PMV BLD AUTO: 9.3 FL (ref 9.2–11.8)
POTASSIUM SERPL-SCNC: 4 MMOL/L (ref 3.5–5.5)
PROT SERPL-MCNC: 8.5 G/DL (ref 6.4–8.2)
PROT UR STRIP-MCNC: NEGATIVE MG/DL
RBC # BLD AUTO: 3.03 M/UL (ref 4.35–5.65)
SODIUM SERPL-SCNC: 140 MMOL/L (ref 136–145)
SP GR UR REFRACTOMETRY: 1.01 (ref 1–1.03)
UROBILINOGEN UR QL STRIP.AUTO: 0.2 EU/DL (ref 0.2–1)
WBC # BLD AUTO: 4.4 K/UL (ref 4.6–13.2)

## 2023-07-18 PROCEDURE — 99284 EMERGENCY DEPT VISIT MOD MDM: CPT

## 2023-07-18 PROCEDURE — 51702 INSERT TEMP BLADDER CATH: CPT

## 2023-07-18 PROCEDURE — 6370000000 HC RX 637 (ALT 250 FOR IP): Performed by: STUDENT IN AN ORGANIZED HEALTH CARE EDUCATION/TRAINING PROGRAM

## 2023-07-18 PROCEDURE — 85025 COMPLETE CBC W/AUTO DIFF WBC: CPT

## 2023-07-18 PROCEDURE — 72131 CT LUMBAR SPINE W/O DYE: CPT

## 2023-07-18 PROCEDURE — 51798 US URINE CAPACITY MEASURE: CPT

## 2023-07-18 PROCEDURE — 81003 URINALYSIS AUTO W/O SCOPE: CPT

## 2023-07-18 PROCEDURE — 6370000000 HC RX 637 (ALT 250 FOR IP)

## 2023-07-18 PROCEDURE — 80053 COMPREHEN METABOLIC PANEL: CPT

## 2023-07-18 PROCEDURE — 72148 MRI LUMBAR SPINE W/O DYE: CPT

## 2023-07-18 RX ORDER — IBUPROFEN 600 MG/1
600 TABLET ORAL
Status: COMPLETED | OUTPATIENT
Start: 2023-07-18 | End: 2023-07-18

## 2023-07-18 RX ORDER — METHOCARBAMOL 500 MG/1
1000 TABLET, FILM COATED ORAL ONCE
Status: COMPLETED | OUTPATIENT
Start: 2023-07-18 | End: 2023-07-18

## 2023-07-18 RX ORDER — METHOCARBAMOL 750 MG/1
750 TABLET, FILM COATED ORAL 4 TIMES DAILY PRN
Qty: 40 TABLET | Refills: 0 | Status: SHIPPED | OUTPATIENT
Start: 2023-07-18 | End: 2023-07-28

## 2023-07-18 RX ORDER — HYDROCODONE BITARTRATE AND ACETAMINOPHEN 5; 325 MG/1; MG/1
TABLET ORAL
Status: COMPLETED
Start: 2023-07-18 | End: 2023-07-18

## 2023-07-18 RX ORDER — HYDROCODONE BITARTRATE AND ACETAMINOPHEN 5; 325 MG/1; MG/1
1 TABLET ORAL
Status: COMPLETED | OUTPATIENT
Start: 2023-07-18 | End: 2023-07-18

## 2023-07-18 RX ORDER — TAMSULOSIN HYDROCHLORIDE 0.4 MG/1
0.4 CAPSULE ORAL DAILY
Qty: 30 CAPSULE | Refills: 1 | Status: SHIPPED | OUTPATIENT
Start: 2023-07-18

## 2023-07-18 RX ORDER — IBUPROFEN 600 MG/1
600 TABLET ORAL 4 TIMES DAILY PRN
Qty: 40 TABLET | Refills: 0 | Status: SHIPPED | OUTPATIENT
Start: 2023-07-18

## 2023-07-18 RX ORDER — DOCUSATE SODIUM 100 MG/1
100 CAPSULE, LIQUID FILLED ORAL
Status: COMPLETED | OUTPATIENT
Start: 2023-07-18 | End: 2023-07-18

## 2023-07-18 RX ORDER — HYDROCODONE BITARTRATE AND ACETAMINOPHEN 5; 325 MG/1; MG/1
2 TABLET ORAL
Status: COMPLETED | OUTPATIENT
Start: 2023-07-18 | End: 2023-07-18

## 2023-07-18 RX ORDER — IBUPROFEN 600 MG/1
TABLET ORAL
Status: COMPLETED
Start: 2023-07-18 | End: 2023-07-18

## 2023-07-18 RX ORDER — LIDOCAINE 4 G/G
1 PATCH TOPICAL
Status: DISCONTINUED | OUTPATIENT
Start: 2023-07-18 | End: 2023-07-18 | Stop reason: HOSPADM

## 2023-07-18 RX ORDER — HYDROCODONE BITARTRATE AND ACETAMINOPHEN 5; 325 MG/1; MG/1
1 TABLET ORAL EVERY 4 HOURS PRN
Qty: 18 TABLET | Refills: 0 | Status: SHIPPED | OUTPATIENT
Start: 2023-07-18 | End: 2023-07-21

## 2023-07-18 RX ORDER — LIDOCAINE 50 MG/G
1 PATCH TOPICAL DAILY
Qty: 15 PATCH | Refills: 0 | Status: SHIPPED | OUTPATIENT
Start: 2023-07-18 | End: 2023-08-02

## 2023-07-18 RX ADMIN — METHOCARBAMOL 1000 MG: 500 TABLET ORAL at 12:11

## 2023-07-18 RX ADMIN — IBUPROFEN 600 MG: 600 TABLET, FILM COATED ORAL at 18:22

## 2023-07-18 RX ADMIN — HYDROCODONE BITARTRATE AND ACETAMINOPHEN 1 TABLET: 5; 325 TABLET ORAL at 18:23

## 2023-07-18 RX ADMIN — DOCUSATE SODIUM 100 MG: 100 CAPSULE, LIQUID FILLED ORAL at 12:16

## 2023-07-18 RX ADMIN — IBUPROFEN 600 MG: 600 TABLET ORAL at 18:22

## 2023-07-18 RX ADMIN — HYDROCODONE BITARTRATE AND ACETAMINOPHEN 2 TABLET: 5; 325 TABLET ORAL at 12:11

## 2023-07-18 RX ADMIN — IBUPROFEN 600 MG: 600 TABLET, FILM COATED ORAL at 12:11

## 2023-07-18 ASSESSMENT — PAIN - FUNCTIONAL ASSESSMENT: PAIN_FUNCTIONAL_ASSESSMENT: 0-10

## 2023-07-18 ASSESSMENT — LIFESTYLE VARIABLES
HOW OFTEN DO YOU HAVE A DRINK CONTAINING ALCOHOL: NEVER
HOW MANY STANDARD DRINKS CONTAINING ALCOHOL DO YOU HAVE ON A TYPICAL DAY: PATIENT DOES NOT DRINK

## 2023-07-18 ASSESSMENT — PAIN DESCRIPTION - LOCATION: LOCATION: BACK

## 2023-07-18 ASSESSMENT — PAIN SCALES - GENERAL: PAINLEVEL_OUTOF10: 2

## 2023-07-18 NOTE — ED NOTES
Patient declined dodson catheter, MD made aware and states that will attempt to urinate some more while standing.       Meg Madden RN  07/18/23 5938

## 2023-07-18 NOTE — ED TRIAGE NOTES
Patient arrived to ER with complaints of back pain, worse this morning. Patient took aleve and gabapentin and its not helping.

## 2023-07-18 NOTE — ED NOTES
Rn assumed care of patient at this time. Patient verbalizes 6/10 pain. Admits to falling approximately 2 weeks ago. NO loss of bowel or bladder function.       Meg Madden RN  07/18/23 4677

## 2023-07-18 NOTE — FLOWSHEET NOTE
07/18/23 1255   Output (mL)   Urine 400 mL   Urine Assessment   Bladder Scan Volume (mL) 275 mL   $ Bladder scan $ Yes

## 2023-07-18 NOTE — DISCHARGE INSTRUCTIONS
Please call interventional radiology at [facility] [phone] to schedule consultation and possible kyphoplasty by interventional radiology. If symptoms worsen, pain gets uncontrolled, you are unable to ambulate, lose control of your bowels or develop numbness in your groin then please return to the emergency department.

## 2023-07-18 NOTE — FLOWSHEET NOTE
07/18/23 1222   Urine Assessment   Bladder Scan Volume (mL) 478 mL   $ Bladder scan $ Yes     Patient advised to void at this time and we will rescan bladder once completed.

## 2023-07-19 ENCOUNTER — TELEPHONE (OUTPATIENT)
Age: 73
End: 2023-07-19

## 2023-07-19 DIAGNOSIS — M43.9 COMPRESSION DEFORMITY OF VERTEBRA: Primary | ICD-10-CM

## 2023-07-19 NOTE — PROGRESS NOTES
Mri report became available yesterday. L1, L2, poss L4 compression. Patient seen in ED yesterday, had CT w/progression of compression. IR referral ordered. Fu w/ me 2 weeks post-procedure.

## 2023-07-19 NOTE — TELEPHONE ENCOUNTER
Jyotianjel Nino, Patient's wife, called to request a referral for the patient. Patient was in [facility] ER yesterday due to pain. A second MRI and CT with two fractures found. Surgery is needed for patient.      Appt 7/28 with surgeon     Dr. Dorrene Primrose   Phone: [phone] ([address])  Fax: [phone]    Jyoti Nino can be reached at: [phone]

## 2023-07-19 NOTE — PROGRESS NOTES
I called and spoke to Mrs. Quin Che. The pt was identified using 2 pt identifiers. She was informed of the MRI results and states that they has asked for Dr. eJff Villalpando to send a referral to Dr. Diana Spence. I informed her that this was ordered as requested. She states that they already have an appt to see him. She would like to know when Dr. Jeff Villalpando would like him to follow back up at the office. The message will be sent to her for guidance.

## 2023-07-20 NOTE — PROGRESS NOTES
Attempted to contact the pt regarding the question from yesterday's message. He or his wife were able to be reached. A message was left letting them know that the provider would like to see the pt 2 weeks after his procedure. The number to the office was provided in case there are any questions.

## 2023-07-21 DIAGNOSIS — S32.020A COMPRESSION FRACTURE OF L2 VERTEBRA, INITIAL ENCOUNTER (HCC): Primary | ICD-10-CM

## 2023-07-24 DIAGNOSIS — M80.08XA AGE-RELATED OSTEOPOROSIS WITH CURRENT PATHOLOGICAL FRACTURE OF VERTEBRA, INITIAL ENCOUNTER (HCC): Primary | ICD-10-CM

## 2023-07-26 ENCOUNTER — ANESTHESIA EVENT (OUTPATIENT)
Facility: HOSPITAL | Age: 73
End: 2023-07-26

## 2023-07-27 ENCOUNTER — HOSPITAL ENCOUNTER (INPATIENT)
Facility: HOSPITAL | Age: 73
LOS: 4 days | Discharge: HOME HEALTH | DRG: 517 | End: 2023-08-02
Attending: RADIOLOGY | Admitting: INTERNAL MEDICINE
Payer: MEDICARE

## 2023-07-27 ENCOUNTER — ANESTHESIA (OUTPATIENT)
Facility: HOSPITAL | Age: 73
End: 2023-07-27

## 2023-07-27 ENCOUNTER — APPOINTMENT (OUTPATIENT)
Facility: HOSPITAL | Age: 73
DRG: 517 | End: 2023-07-27
Attending: RADIOLOGY
Payer: MEDICARE

## 2023-07-27 DIAGNOSIS — M80.08XA AGE-RELATED OSTEOPOROSIS WITH CURRENT PATHOLOGICAL FRACTURE OF VERTEBRA, INITIAL ENCOUNTER (HCC): ICD-10-CM

## 2023-07-27 DIAGNOSIS — S32.000A CLOSED COMPRESSION FRACTURE OF LUMBOSACRAL SPINE, INITIAL ENCOUNTER (HCC): Primary | ICD-10-CM

## 2023-07-27 PROBLEM — R52 PAIN MANAGEMENT: Status: ACTIVE | Noted: 2023-07-27

## 2023-07-27 PROBLEM — M54.9 BACKACHE WITHOUT RADIATION: Status: ACTIVE | Noted: 2023-07-27

## 2023-07-27 PROBLEM — S32.000G COMPRESSION FRACTURE OF LUMBAR VERTEBRA WITH DELAYED HEALING, UNSPECIFIED LUMBAR VERTEBRAL LEVEL, SUBSEQUENT ENCOUNTER: Status: ACTIVE | Noted: 2023-07-27

## 2023-07-27 LAB
ANION GAP SERPL CALC-SCNC: 11 MMOL/L (ref 3–18)
APTT PPP: 27.3 SEC (ref 23–36.4)
BASOPHILS # BLD: 0 K/UL (ref 0–0.1)
BASOPHILS NFR BLD: 0 % (ref 0–2)
BUN SERPL-MCNC: 24 MG/DL (ref 7–18)
BUN/CREAT SERPL: 25 (ref 12–20)
CALCIUM SERPL-MCNC: 9.3 MG/DL (ref 8.5–10.1)
CHLORIDE SERPL-SCNC: 109 MMOL/L (ref 100–111)
CO2 SERPL-SCNC: 22 MMOL/L (ref 21–32)
CREAT SERPL-MCNC: 0.96 MG/DL (ref 0.6–1.3)
DIFFERENTIAL METHOD BLD: ABNORMAL
EOSINOPHIL # BLD: 0 K/UL (ref 0–0.4)
EOSINOPHIL NFR BLD: 0 % (ref 0–5)
ERYTHROCYTE [DISTWIDTH] IN BLOOD BY AUTOMATED COUNT: 16 % (ref 11.6–14.5)
GLUCOSE SERPL-MCNC: 116 MG/DL (ref 74–99)
HCT VFR BLD AUTO: 28.7 % (ref 36–48)
HGB BLD-MCNC: 9.7 G/DL (ref 13–16)
IMM GRANULOCYTES # BLD AUTO: 0 K/UL (ref 0–0.04)
IMM GRANULOCYTES NFR BLD AUTO: 1 % (ref 0–0.5)
INR PPP: 1 (ref 0.8–1.2)
LYMPHOCYTES # BLD: 0.7 K/UL (ref 0.9–3.6)
LYMPHOCYTES NFR BLD: 11 % (ref 21–52)
MCH RBC QN AUTO: 34 PG (ref 24–34)
MCHC RBC AUTO-ENTMCNC: 33.8 G/DL (ref 31–37)
MCV RBC AUTO: 100.7 FL (ref 78–100)
MONOCYTES # BLD: 0.3 K/UL (ref 0.05–1.2)
MONOCYTES NFR BLD: 5 % (ref 3–10)
NEUTS SEG # BLD: 5.1 K/UL (ref 1.8–8)
NEUTS SEG NFR BLD: 83 % (ref 40–73)
NRBC # BLD: 0 K/UL (ref 0–0.01)
NRBC BLD-RTO: 0 PER 100 WBC
PLATELET # BLD AUTO: 184 K/UL (ref 135–420)
PMV BLD AUTO: 9.9 FL (ref 9.2–11.8)
POTASSIUM SERPL-SCNC: 3.3 MMOL/L (ref 3.5–5.5)
PROTHROMBIN TIME: 13.9 SEC (ref 11.5–15.2)
RBC # BLD AUTO: 2.85 M/UL (ref 4.35–5.65)
SODIUM SERPL-SCNC: 142 MMOL/L (ref 136–145)
WBC # BLD AUTO: 6.2 K/UL (ref 4.6–13.2)

## 2023-07-27 PROCEDURE — 7100000001 HC PACU RECOVERY - ADDTL 15 MIN

## 2023-07-27 PROCEDURE — 6360000002 HC RX W HCPCS: Performed by: NURSE ANESTHETIST, CERTIFIED REGISTERED

## 2023-07-27 PROCEDURE — 96375 TX/PRO/DX INJ NEW DRUG ADDON: CPT

## 2023-07-27 PROCEDURE — G0378 HOSPITAL OBSERVATION PER HR: HCPCS

## 2023-07-27 PROCEDURE — 6360000004 HC RX CONTRAST MEDICATION: Performed by: RADIOLOGY

## 2023-07-27 PROCEDURE — 2580000003 HC RX 258: Performed by: INTERNAL MEDICINE

## 2023-07-27 PROCEDURE — 2580000003 HC RX 258: Performed by: NURSE ANESTHETIST, CERTIFIED REGISTERED

## 2023-07-27 PROCEDURE — 7100000000 HC PACU RECOVERY - FIRST 15 MIN

## 2023-07-27 PROCEDURE — 2500000003 HC RX 250 WO HCPCS: Performed by: NURSE ANESTHETIST, CERTIFIED REGISTERED

## 2023-07-27 PROCEDURE — 0QU03JZ SUPPLEMENT LUMBAR VERTEBRA WITH SYNTHETIC SUBSTITUTE, PERCUTANEOUS APPROACH: ICD-10-PCS | Performed by: RADIOLOGY

## 2023-07-27 PROCEDURE — 2700000000 HC OXYGEN THERAPY PER DAY

## 2023-07-27 PROCEDURE — 22514 PERQ VERTEBRAL AUGMENTATION: CPT

## 2023-07-27 PROCEDURE — 72080 X-RAY EXAM THORACOLMB 2/> VW: CPT

## 2023-07-27 PROCEDURE — 6370000000 HC RX 637 (ALT 250 FOR IP): Performed by: INTERNAL MEDICINE

## 2023-07-27 PROCEDURE — 99234 HOSP IP/OBS SM DT SF/LOW 45: CPT | Performed by: INTERNAL MEDICINE

## 2023-07-27 PROCEDURE — 96374 THER/PROPH/DIAG INJ IV PUSH: CPT

## 2023-07-27 PROCEDURE — G0379 DIRECT REFER HOSPITAL OBSERV: HCPCS

## 2023-07-27 PROCEDURE — 85610 PROTHROMBIN TIME: CPT

## 2023-07-27 PROCEDURE — 80048 BASIC METABOLIC PNL TOTAL CA: CPT

## 2023-07-27 PROCEDURE — 85730 THROMBOPLASTIN TIME PARTIAL: CPT

## 2023-07-27 PROCEDURE — 85025 COMPLETE CBC W/AUTO DIFF WBC: CPT

## 2023-07-27 PROCEDURE — 2500000003 HC RX 250 WO HCPCS: Performed by: RADIOLOGY

## 2023-07-27 PROCEDURE — 3700000001 HC ADD 15 MINUTES (ANESTHESIA)

## 2023-07-27 PROCEDURE — 3700000000 HC ANESTHESIA ATTENDED CARE

## 2023-07-27 RX ORDER — ACETAMINOPHEN 650 MG/1
650 SUPPOSITORY RECTAL EVERY 6 HOURS PRN
Status: DISCONTINUED | OUTPATIENT
Start: 2023-07-27 | End: 2023-08-02 | Stop reason: HOSPADM

## 2023-07-27 RX ORDER — ACETAMINOPHEN 325 MG/1
650 TABLET ORAL EVERY 6 HOURS PRN
Status: DISCONTINUED | OUTPATIENT
Start: 2023-07-27 | End: 2023-08-02 | Stop reason: HOSPADM

## 2023-07-27 RX ORDER — POLYETHYLENE GLYCOL 3350 17 G/17G
17 POWDER, FOR SOLUTION ORAL DAILY PRN
Status: DISCONTINUED | OUTPATIENT
Start: 2023-07-27 | End: 2023-08-02 | Stop reason: HOSPADM

## 2023-07-27 RX ORDER — PROPOFOL 10 MG/ML
INJECTION, EMULSION INTRAVENOUS CONTINUOUS PRN
Status: DISCONTINUED | OUTPATIENT
Start: 2023-07-27 | End: 2023-07-27

## 2023-07-27 RX ORDER — METOPROLOL SUCCINATE 50 MG/1
50 TABLET, EXTENDED RELEASE ORAL DAILY
Status: DISCONTINUED | OUTPATIENT
Start: 2023-07-27 | End: 2023-08-02 | Stop reason: HOSPADM

## 2023-07-27 RX ORDER — SODIUM CHLORIDE 0.9 % (FLUSH) 0.9 %
5-40 SYRINGE (ML) INJECTION EVERY 12 HOURS SCHEDULED
Status: DISCONTINUED | OUTPATIENT
Start: 2023-07-27 | End: 2023-08-02 | Stop reason: HOSPADM

## 2023-07-27 RX ORDER — PROPOFOL 10 MG/ML
INJECTION, EMULSION INTRAVENOUS PRN
Status: DISCONTINUED | OUTPATIENT
Start: 2023-07-27 | End: 2023-07-27

## 2023-07-27 RX ORDER — ONDANSETRON 2 MG/ML
4 INJECTION INTRAMUSCULAR; INTRAVENOUS EVERY 6 HOURS PRN
Status: DISCONTINUED | OUTPATIENT
Start: 2023-07-27 | End: 2023-08-02 | Stop reason: HOSPADM

## 2023-07-27 RX ORDER — PROCHLORPERAZINE EDISYLATE 5 MG/ML
10 INJECTION INTRAMUSCULAR; INTRAVENOUS
Status: DISCONTINUED | OUTPATIENT
Start: 2023-07-27 | End: 2023-07-27

## 2023-07-27 RX ORDER — LIDOCAINE HYDROCHLORIDE 20 MG/ML
INJECTION, SOLUTION EPIDURAL; INFILTRATION; INTRACAUDAL; PERINEURAL PRN
Status: DISCONTINUED | OUTPATIENT
Start: 2023-07-27 | End: 2023-07-27 | Stop reason: SDUPTHER

## 2023-07-27 RX ORDER — CEFAZOLIN SODIUM 1 G/3ML
INJECTION, POWDER, FOR SOLUTION INTRAMUSCULAR; INTRAVENOUS PRN
Status: DISCONTINUED | OUTPATIENT
Start: 2023-07-27 | End: 2023-07-27 | Stop reason: SDUPTHER

## 2023-07-27 RX ORDER — SENNOSIDES A AND B 8.6 MG/1
1 TABLET, FILM COATED ORAL NIGHTLY
Status: DISCONTINUED | OUTPATIENT
Start: 2023-07-27 | End: 2023-08-02 | Stop reason: HOSPADM

## 2023-07-27 RX ORDER — METHOCARBAMOL 750 MG/1
750 TABLET, FILM COATED ORAL 4 TIMES DAILY PRN
Status: DISCONTINUED | OUTPATIENT
Start: 2023-07-27 | End: 2023-08-02 | Stop reason: HOSPADM

## 2023-07-27 RX ORDER — TAMSULOSIN HYDROCHLORIDE 0.4 MG/1
0.4 CAPSULE ORAL DAILY
Status: DISCONTINUED | OUTPATIENT
Start: 2023-07-28 | End: 2023-07-27

## 2023-07-27 RX ORDER — ROSUVASTATIN CALCIUM 20 MG/1
20 TABLET, COATED ORAL DAILY
Status: DISCONTINUED | OUTPATIENT
Start: 2023-07-28 | End: 2023-08-02 | Stop reason: HOSPADM

## 2023-07-27 RX ORDER — ONDANSETRON 2 MG/ML
4 INJECTION INTRAMUSCULAR; INTRAVENOUS
Status: DISCONTINUED | OUTPATIENT
Start: 2023-07-27 | End: 2023-07-27

## 2023-07-27 RX ORDER — SODIUM CHLORIDE 9 MG/ML
INJECTION, SOLUTION INTRAVENOUS CONTINUOUS
Status: DISCONTINUED | OUTPATIENT
Start: 2023-07-27 | End: 2023-07-27

## 2023-07-27 RX ORDER — PROPOFOL 10 MG/ML
INJECTION, EMULSION INTRAVENOUS CONTINUOUS PRN
Status: DISCONTINUED | OUTPATIENT
Start: 2023-07-27 | End: 2023-07-27 | Stop reason: SDUPTHER

## 2023-07-27 RX ORDER — SODIUM CHLORIDE 9 MG/ML
INJECTION, SOLUTION INTRAVENOUS CONTINUOUS PRN
Status: DISCONTINUED | OUTPATIENT
Start: 2023-07-27 | End: 2023-07-27 | Stop reason: SDUPTHER

## 2023-07-27 RX ORDER — FAMOTIDINE 20 MG/1
20 TABLET, FILM COATED ORAL ONCE
Status: DISCONTINUED | OUTPATIENT
Start: 2023-07-27 | End: 2023-07-27

## 2023-07-27 RX ORDER — FENTANYL CITRATE 50 UG/ML
50 INJECTION, SOLUTION INTRAMUSCULAR; INTRAVENOUS EVERY 5 MIN PRN
Status: DISCONTINUED | OUTPATIENT
Start: 2023-07-27 | End: 2023-07-27

## 2023-07-27 RX ORDER — FENTANYL CITRATE 50 UG/ML
INJECTION, SOLUTION INTRAMUSCULAR; INTRAVENOUS PRN
Status: DISCONTINUED | OUTPATIENT
Start: 2023-07-27 | End: 2023-07-27 | Stop reason: SDUPTHER

## 2023-07-27 RX ORDER — TAMSULOSIN HYDROCHLORIDE 0.4 MG/1
0.4 CAPSULE ORAL
Status: DISCONTINUED | OUTPATIENT
Start: 2023-07-27 | End: 2023-08-02 | Stop reason: HOSPADM

## 2023-07-27 RX ORDER — SODIUM CHLORIDE, SODIUM LACTATE, POTASSIUM CHLORIDE, CALCIUM CHLORIDE 600; 310; 30; 20 MG/100ML; MG/100ML; MG/100ML; MG/100ML
INJECTION, SOLUTION INTRAVENOUS CONTINUOUS
Status: DISCONTINUED | OUTPATIENT
Start: 2023-07-27 | End: 2023-07-27

## 2023-07-27 RX ORDER — SODIUM CHLORIDE 0.9 % (FLUSH) 0.9 %
5-40 SYRINGE (ML) INJECTION PRN
Status: DISCONTINUED | OUTPATIENT
Start: 2023-07-27 | End: 2023-08-02 | Stop reason: HOSPADM

## 2023-07-27 RX ORDER — ONDANSETRON 4 MG/1
4 TABLET, ORALLY DISINTEGRATING ORAL EVERY 8 HOURS PRN
Status: DISCONTINUED | OUTPATIENT
Start: 2023-07-27 | End: 2023-08-02 | Stop reason: HOSPADM

## 2023-07-27 RX ORDER — HYDROMORPHONE HYDROCHLORIDE 2 MG/ML
0.5 INJECTION, SOLUTION INTRAMUSCULAR; INTRAVENOUS; SUBCUTANEOUS EVERY 4 HOURS PRN
Status: DISCONTINUED | OUTPATIENT
Start: 2023-07-27 | End: 2023-08-02 | Stop reason: HOSPADM

## 2023-07-27 RX ORDER — SODIUM CHLORIDE 9 MG/ML
INJECTION, SOLUTION INTRAVENOUS PRN
Status: DISCONTINUED | OUTPATIENT
Start: 2023-07-27 | End: 2023-08-02 | Stop reason: HOSPADM

## 2023-07-27 RX ORDER — LIDOCAINE HYDROCHLORIDE 10 MG/ML
1 INJECTION, SOLUTION EPIDURAL; INFILTRATION; INTRACAUDAL; PERINEURAL
Status: DISCONTINUED | OUTPATIENT
Start: 2023-07-27 | End: 2023-07-27

## 2023-07-27 RX ORDER — GABAPENTIN 100 MG/1
100 CAPSULE ORAL 3 TIMES DAILY PRN
Status: DISCONTINUED | OUTPATIENT
Start: 2023-07-27 | End: 2023-08-02 | Stop reason: HOSPADM

## 2023-07-27 RX ORDER — LIDOCAINE HYDROCHLORIDE 10 MG/ML
30 INJECTION, SOLUTION EPIDURAL; INFILTRATION; INTRACAUDAL; PERINEURAL ONCE
Status: COMPLETED | OUTPATIENT
Start: 2023-07-27 | End: 2023-07-27

## 2023-07-27 RX ORDER — OXYCODONE HYDROCHLORIDE AND ACETAMINOPHEN 5; 325 MG/1; MG/1
2 TABLET ORAL EVERY 4 HOURS PRN
Status: DISCONTINUED | OUTPATIENT
Start: 2023-07-27 | End: 2023-08-02 | Stop reason: HOSPADM

## 2023-07-27 RX ORDER — MIDAZOLAM HYDROCHLORIDE 1 MG/ML
INJECTION INTRAMUSCULAR; INTRAVENOUS PRN
Status: DISCONTINUED | OUTPATIENT
Start: 2023-07-27 | End: 2023-07-27 | Stop reason: SDUPTHER

## 2023-07-27 RX ADMIN — MIDAZOLAM 1 MG: 1 INJECTION, SOLUTION INTRAMUSCULAR; INTRAVENOUS at 11:57

## 2023-07-27 RX ADMIN — PROPOFOL 140 MCG/KG/MIN: 10 INJECTION, EMULSION INTRAVENOUS at 12:12

## 2023-07-27 RX ADMIN — SODIUM CHLORIDE: 900 INJECTION, SOLUTION INTRAVENOUS at 11:53

## 2023-07-27 RX ADMIN — FENTANYL CITRATE 50 MCG: 50 INJECTION INTRAMUSCULAR; INTRAVENOUS at 11:53

## 2023-07-27 RX ADMIN — SODIUM CHLORIDE, PRESERVATIVE FREE 10 ML: 5 INJECTION INTRAVENOUS at 20:49

## 2023-07-27 RX ADMIN — LIDOCAINE HYDROCHLORIDE 20 MG: 10 INJECTION, SOLUTION EPIDURAL; INFILTRATION; INTRACAUDAL; PERINEURAL at 12:00

## 2023-07-27 RX ADMIN — LIDOCAINE HYDROCHLORIDE 30 ML: 10 INJECTION, SOLUTION EPIDURAL; INFILTRATION; INTRACAUDAL; PERINEURAL at 12:49

## 2023-07-27 RX ADMIN — METOPROLOL SUCCINATE 50 MG: 50 TABLET, FILM COATED, EXTENDED RELEASE ORAL at 20:39

## 2023-07-27 RX ADMIN — MIDAZOLAM 1 MG: 1 INJECTION, SOLUTION INTRAMUSCULAR; INTRAVENOUS at 12:01

## 2023-07-27 RX ADMIN — LIDOCAINE HYDROCHLORIDE 60 MG: 20 INJECTION, SOLUTION EPIDURAL; INFILTRATION; INTRACAUDAL; PERINEURAL at 12:01

## 2023-07-27 RX ADMIN — CEFAZOLIN 2 G: 330 INJECTION, POWDER, FOR SOLUTION INTRAMUSCULAR; INTRAVENOUS at 12:14

## 2023-07-27 RX ADMIN — FENTANYL CITRATE 50 MCG: 50 INJECTION, SOLUTION INTRAMUSCULAR; INTRAVENOUS at 15:56

## 2023-07-27 RX ADMIN — FENTANYL CITRATE 50 MCG: 50 INJECTION INTRAMUSCULAR; INTRAVENOUS at 12:12

## 2023-07-27 RX ADMIN — IOHEXOL 50 ML: 300 INJECTION, SOLUTION INTRAVENOUS at 12:50

## 2023-07-27 RX ADMIN — TAMSULOSIN HYDROCHLORIDE 0.4 MG: 0.4 CAPSULE ORAL at 20:38

## 2023-07-27 RX ADMIN — SENNOSIDES 8.6 MG: 8.6 TABLET, FILM COATED ORAL at 20:39

## 2023-07-27 RX ADMIN — HYDROMORPHONE HYDROCHLORIDE 0.5 MG: 1 INJECTION, SOLUTION INTRAMUSCULAR; INTRAVENOUS; SUBCUTANEOUS at 15:13

## 2023-07-27 ASSESSMENT — PAIN SCALES - GENERAL
PAINLEVEL_OUTOF10: 0
PAINLEVEL_OUTOF10: 0
PAINLEVEL_OUTOF10: 8
PAINLEVEL_OUTOF10: 0
PAINLEVEL_OUTOF10: 4
PAINLEVEL_OUTOF10: 0
PAINLEVEL_OUTOF10: 6

## 2023-07-27 ASSESSMENT — PAIN DESCRIPTION - ORIENTATION
ORIENTATION: LOWER
ORIENTATION: LEFT;LOWER

## 2023-07-27 ASSESSMENT — PAIN DESCRIPTION - DESCRIPTORS
DESCRIPTORS: ACHING
DESCRIPTORS: CRUSHING

## 2023-07-27 ASSESSMENT — PAIN DESCRIPTION - LOCATION
LOCATION: BACK
LOCATION: BACK

## 2023-07-27 NOTE — H&P
History and Physical    Patient: Dayana Johnson           Sex: male       DOA: 7/27/2023  YOB: 1950      Age:  67 y.o.     LOS:  LOS: 0 days        HPI:     Dayana Johnson is a 67 y.o. male with history of L2 and L4 compression fractures with associated intractable back pain refractory to conservative management who presents for L2 and L4 kyphoplasty. Past Medical History:   Diagnosis Date    Abnormal nuclear cardiac imaging test 01/08/2016    Low risk. No ischemia or prior infarction. No RWMA. EF 63%. Neg EKG on pharm stress test.    Arthritis     BACK, KNEES    Echocardiogram abnormal 01/08/2016    EF 60%. No WMA. Mod LVH. Gr 2 DDfx. Mild TIEN. H/O transesophageal echocardiography (WAYLON) for monitoring 11/08/2016    EF 55%. No WMA. LVH. Mild LAE. No LA appendage thrombus. Mod TIEN. Holter monitor, abnormal 02/12/2016    Mildly abnormal 48-hr Holter. Normal sinus rhythm w/a few short bursts of atrial fibrillation from 20-40 beats up to 170 bpm.  No symptoms reported.     Hypercholesterolemia     Hypertension     YOAN treated with BiPAP     Typical atrial flutter ([address]) 12/19/2015    atrial flutter with 2 to1 block at a rate of 135 on EKG       Past Surgical History:   Procedure Laterality Date    CATARACT REMOVAL Bilateral     COLOSTOMY CLOSURE N/A 2019    took 10 inches of colon out because of a blockage     HEMORRHOID SURGERY      [place] UNLISTED PROCEDURE ABDOMEN PERITONEUM & OMENTUM      COLECOMY WITH COLOSTOMY - D/T BLOCKAGE    [place] UNLISTED PROCEDURE CARDIAC SURGERY      ablation  11/16       Family History   Problem Relation Age of Onset    Colon Cancer Mother     Cancer Mother         COLON    Heart Disease Mother         a fib    Colon Polyps Sister     Colon Polyps Brother     Heart Disease Brother         a fib    Alzheimer's Disease Father     Heart Disease Father        Social History     Socioeconomic History    Marital status:      Spouse name: None    Number

## 2023-07-27 NOTE — PROGRESS NOTES
Dr. Nicolas Stubbs called about pt. Still having pain whenever he moves. Will admit pt. For overnight observations.

## 2023-07-27 NOTE — DISCHARGE INSTRUCTIONS
Kyphoplasty: What to Expect at [address]  After kyphoplasty to relieve pain from compression fractures, your back may feel sore where the hollow needle (trocar) went into your back. This should go away in a few days. Most people are able to return to their daily activities within a day. This care sheet gives you a general idea about how long it will take for you to recover. But each person recovers at a different pace. Follow the steps below to get better as quickly as possible. How can you care for yourself at home? Activity    Take it easy for the first 24 hours. Rest when you feel tired. Getting enough sleep will help you recover. For the first day after the procedure, avoid lifting anything that would make you strain. This may include heavy grocery bags and milk containers, a heavy briefcase or backpack, cat litter or dog food bags, a vacuum , or a child. Diet    You can eat your normal diet. If your stomach is upset, try bland, low-fat foods like plain rice, broiled chicken, toast, and yogurt. Medicines    Your doctor will tell you if and when you can restart your medicines. You will also get instructions about taking any new medicines. If you stopped taking aspirin or some other blood thinner, your doctor will tell you when to start taking it again. Be safe with medicines. Take pain medicines exactly as directed. If the doctor gave you a prescription medicine for pain, take it as prescribed. If you are not taking a prescription pain medicine, ask your doctor if you can take an over-the-counter medicine. Do not take two or more pain medicines at the same time unless your doctor told you to. Many pain medicines have acetaminophen, which is Tylenol. Too much acetaminophen (Tylenol) can be harmful. Incision care    You will have a dressing over the cut (incision). A dressing helps the incision heal and protects it. Your doctor will tell you how to take care of this.

## 2023-07-27 NOTE — ANESTHESIA POSTPROCEDURE EVALUATION
Department of Anesthesiology  Postprocedure Note    Patient: Allyson Peguero  MRN: [id number]  YOB: 1950  Date of evaluation: 7/27/2023      Procedure Summary     Date: 07/27/23 Room / Location: [address] [place] [address] ANGIO IR; [facility] CATH LAB    Anesthesia Start: 7507 Anesthesia Stop: 2471    Procedure: IR KYPHOPLASTY LUMBAR FIRST LEVEL Diagnosis:       Age-related osteoporosis with current pathological fracture of vertebra, initial encounter ([address])      (L2 and L4 acute compression fractures, need kypho with biopsy)    Scheduled Providers:  Alejandrina Florentino MD Responsible Provider: Sabas Claude, MD    Anesthesia Type: MAC ASA Status: 3          Anesthesia Type: MAC    Megan Phase I: Megan Score: 10    Megan Phase II:        Anesthesia Post Evaluation    Patient location during evaluation: bedside  Patient participation: complete - patient participated  Airway patency: patent  Complications: no  Cardiovascular status: hemodynamically stable  Respiratory status: acceptable  Hydration status: stable

## 2023-07-27 NOTE — ANESTHESIA PRE PROCEDURE
Department of Anesthesiology  Preprocedure Note       Name:  Diya Harry   Age:  67 y.o.  :  1950                                          MRN:  [id number]         Date:  2023      Surgeon: * No surgeons listed *    Procedure: * No procedures listed *    Medications prior to admission:   Prior to Admission medications    Medication Sig Start Date End Date Taking? Authorizing Provider   ibuprofen (ADVIL;MOTRIN) 600 MG tablet Take 1 tablet by mouth 4 times daily as needed for Pain 23   Cynthia Powers MD   lidocaine (LIDODERM) 5 % Place 1 patch onto the skin daily for 15 days 12 hours on, 12 hours off. 23  Cynthia Powers MD   methocarbamol (ROBAXIN-750) 750 MG tablet Take 1 tablet by mouth 4 times daily as needed (back pain) 23  Cynthia Powers MD   tamsulosin (FLOMAX) 0.4 MG capsule Take 1 capsule by mouth daily 23   Cynthia Powers MD   gabapentin (NEURONTIN) 100 MG capsule Take 1 capsule by mouth 3 times daily as needed (pain) for up to 60 days. Intended supply: 30 days Max Daily Amount: 300 mg 23  Emmanuel Chowdary MD   methylPREDNISolone (MEDROL DOSEPACK) 4 MG tablet Take by mouth as directed w/food. Do not combine /NSAIDS. May increase blood pressure and blood glucose.  23   Emmanuel Chowdary MD   Psyllium (METAMUCIL PO) Take 1 Package by mouth daily    Historical Provider, MD   Ascorbic Acid (VITAMIN C) 100 MG CHEW Take 1 tablet by mouth daily    Historical Provider, MD   Omega-3 Fatty Acids (FISH OIL PO) Take 1 capsule by mouth daily    Historical Provider, MD   Multiple Vitamins-Minerals (CENTRUM SILVER PO) Take 1 tablet by mouth daily  Patient not taking: Reported on 2023    Historical Provider, MD   cyclobenzaprine (FLEXERIL) 5 MG tablet Take 1 tablet by mouth 2 times daily as needed 22   Ar Automatic Reconciliation   loratadine (CLARITIN) 10 MG tablet Take 1 tablet by mouth daily as needed    Ar Automatic Reconciliation   metoprolol bryan

## 2023-07-27 NOTE — H&P
History and Physical    Patient: Frantz Cunha MRN: [id number]  SSN: [id number]    YOB: 1950    Age: 67 y.o. Sex: male    Montel Boas, MD    C/C : Backache     Subjective:      Frantz Cunha is a 67 y.o. male with past medical history of severe osteoarthritis including spine multiple compression fracture, now admitted for kyphoplasty s/p kyphoplasty patient having severe backache postprocedure. Patient needs to be admitted as he has increasing pain post kyphoplasty patient will have further evaluation by IR inform of x-rays if it is stable he can be discharged tomorrow by IR clearance. Apart from back pain patient is not giving much history and any other issues. Patient's other medical issues are listed below and they are stable    Past Medical History:   Diagnosis Date    Abnormal nuclear cardiac imaging test 01/08/2016    Low risk. No ischemia or prior infarction. No RWMA. EF 63%. Neg EKG on pharm stress test.    Arthritis     BACK, KNEES    Echocardiogram abnormal 01/08/2016    EF 60%. No WMA. Mod LVH. Gr 2 DDfx. Mild TIEN. H/O transesophageal echocardiography (WAYLON) for monitoring 11/08/2016    EF 55%. No WMA. LVH. Mild LAE. No LA appendage thrombus. Mod TIEN. Holter monitor, abnormal 02/12/2016    Mildly abnormal 48-hr Holter. Normal sinus rhythm w/a few short bursts of atrial fibrillation from 20-40 beats up to 170 bpm.  No symptoms reported.     Hypercholesterolemia     Hypertension     YOAN treated with BiPAP     Typical atrial flutter ([address]) 12/19/2015    atrial flutter with 2 to1 block at a rate of 135 on EKG     Past Surgical History:   Procedure Laterality Date    CATARACT REMOVAL Bilateral     COLOSTOMY CLOSURE N/A 2019    took 10 inches of colon out because of a blockage     HEMORRHOID SURGERY      [place] UNLISTED PROCEDURE ABDOMEN PERITONEUM & OMENTUM      COLECOMY WITH COLOSTOMY - D/T BLOCKAGE    [place] UNLISTED PROCEDURE CARDIAC SURGERY      ablation  11/16

## 2023-07-27 NOTE — PROGRESS NOTES
Cath holding summary:    0920: Patient ambulated from waiting area without difficulty, placed on monitor. A&Ox4, no c/o. ID, NPO status, allergies, home meds verified, and H&P reviewed. PIV x1 inserted, blood sent to lab. Consent ready for signature. Provider notified of patient arrival.     65: Verbal report given to [facility], RT on Tricia Lopez  being transferred to IR Angio for ordered procedure. Report consisted of patient's Situation, Background, Assessment and Recommendations (SBAR). Information from the following report(s) Adult Overview, Intake/Output, MAR, Recent Results, Med Rec Status, Pre Procedure Checklist, Procedure Verification, Quality Measures, and Neuro Assessment was reviewed with the receiving nurse. Opportunity for questions and clarification was provided.

## 2023-07-28 ENCOUNTER — APPOINTMENT (OUTPATIENT)
Facility: HOSPITAL | Age: 73
DRG: 517 | End: 2023-07-28
Attending: RADIOLOGY
Payer: MEDICARE

## 2023-07-28 LAB
ANION GAP SERPL CALC-SCNC: 8 MMOL/L (ref 3–18)
BASOPHILS # BLD: 0 K/UL (ref 0–0.1)
BASOPHILS NFR BLD: 0 % (ref 0–2)
BUN SERPL-MCNC: 25 MG/DL (ref 7–18)
BUN/CREAT SERPL: 29 (ref 12–20)
CALCIUM SERPL-MCNC: 9 MG/DL (ref 8.5–10.1)
CHLORIDE SERPL-SCNC: 111 MMOL/L (ref 100–111)
CO2 SERPL-SCNC: 23 MMOL/L (ref 21–32)
CREAT SERPL-MCNC: 0.86 MG/DL (ref 0.6–1.3)
DIFFERENTIAL METHOD BLD: ABNORMAL
EOSINOPHIL # BLD: 0 K/UL (ref 0–0.4)
EOSINOPHIL NFR BLD: 0 % (ref 0–5)
ERYTHROCYTE [DISTWIDTH] IN BLOOD BY AUTOMATED COUNT: 15.9 % (ref 11.6–14.5)
FOLATE SERPL-MCNC: >20 NG/ML (ref 3.1–17.5)
GLUCOSE SERPL-MCNC: 100 MG/DL (ref 74–99)
HCT VFR BLD AUTO: 26.1 % (ref 36–48)
HGB BLD-MCNC: 8.6 G/DL (ref 13–16)
IMM GRANULOCYTES # BLD AUTO: 0 K/UL (ref 0–0.04)
IMM GRANULOCYTES NFR BLD AUTO: 1 % (ref 0–0.5)
LYMPHOCYTES # BLD: 0.9 K/UL (ref 0.9–3.6)
LYMPHOCYTES NFR BLD: 15 % (ref 21–52)
MAGNESIUM SERPL-MCNC: 1.9 MG/DL (ref 1.6–2.6)
MCH RBC QN AUTO: 34.3 PG (ref 24–34)
MCHC RBC AUTO-ENTMCNC: 33 G/DL (ref 31–37)
MCV RBC AUTO: 104 FL (ref 78–100)
MONOCYTES # BLD: 0.3 K/UL (ref 0.05–1.2)
MONOCYTES NFR BLD: 5 % (ref 3–10)
NEUTS SEG # BLD: 4.8 K/UL (ref 1.8–8)
NEUTS SEG NFR BLD: 79 % (ref 40–73)
NRBC # BLD: 0 K/UL (ref 0–0.01)
NRBC BLD-RTO: 0 PER 100 WBC
PLATELET # BLD AUTO: 151 K/UL (ref 135–420)
PMV BLD AUTO: 9.6 FL (ref 9.2–11.8)
POTASSIUM SERPL-SCNC: 3.8 MMOL/L (ref 3.5–5.5)
RBC # BLD AUTO: 2.51 M/UL (ref 4.35–5.65)
SODIUM SERPL-SCNC: 142 MMOL/L (ref 136–145)
VIT B12 SERPL-MCNC: 267 PG/ML (ref 211–911)
WBC # BLD AUTO: 6.1 K/UL (ref 4.6–13.2)

## 2023-07-28 PROCEDURE — 71045 X-RAY EXAM CHEST 1 VIEW: CPT

## 2023-07-28 PROCEDURE — 80048 BASIC METABOLIC PNL TOTAL CA: CPT

## 2023-07-28 PROCEDURE — 82607 VITAMIN B-12: CPT

## 2023-07-28 PROCEDURE — 72148 MRI LUMBAR SPINE W/O DYE: CPT

## 2023-07-28 PROCEDURE — 82746 ASSAY OF FOLIC ACID SERUM: CPT

## 2023-07-28 PROCEDURE — G0378 HOSPITAL OBSERVATION PER HR: HCPCS

## 2023-07-28 PROCEDURE — 2580000003 HC RX 258: Performed by: INTERNAL MEDICINE

## 2023-07-28 PROCEDURE — 2700000000 HC OXYGEN THERAPY PER DAY

## 2023-07-28 PROCEDURE — 94761 N-INVAS EAR/PLS OXIMETRY MLT: CPT

## 2023-07-28 PROCEDURE — 85025 COMPLETE CBC W/AUTO DIFF WBC: CPT

## 2023-07-28 PROCEDURE — 36415 COLL VENOUS BLD VENIPUNCTURE: CPT

## 2023-07-28 PROCEDURE — 6370000000 HC RX 637 (ALT 250 FOR IP): Performed by: INTERNAL MEDICINE

## 2023-07-28 PROCEDURE — 99232 SBSQ HOSP IP/OBS MODERATE 35: CPT | Performed by: INTERNAL MEDICINE

## 2023-07-28 PROCEDURE — 83735 ASSAY OF MAGNESIUM: CPT

## 2023-07-28 PROCEDURE — 72146 MRI CHEST SPINE W/O DYE: CPT

## 2023-07-28 RX ADMIN — METOPROLOL SUCCINATE 50 MG: 50 TABLET, FILM COATED, EXTENDED RELEASE ORAL at 09:41

## 2023-07-28 RX ADMIN — ACETAMINOPHEN 325MG 650 MG: 325 TABLET ORAL at 11:25

## 2023-07-28 RX ADMIN — OXYCODONE HYDROCHLORIDE AND ACETAMINOPHEN 2 TABLET: 5; 325 TABLET ORAL at 13:56

## 2023-07-28 RX ADMIN — ONDANSETRON 4 MG: 4 TABLET, ORALLY DISINTEGRATING ORAL at 16:30

## 2023-07-28 RX ADMIN — TAMSULOSIN HYDROCHLORIDE 0.4 MG: 0.4 CAPSULE ORAL at 21:05

## 2023-07-28 RX ADMIN — SODIUM CHLORIDE, PRESERVATIVE FREE 10 ML: 5 INJECTION INTRAVENOUS at 21:09

## 2023-07-28 RX ADMIN — ROSUVASTATIN CALCIUM 20 MG: 20 TABLET, FILM COATED ORAL at 21:05

## 2023-07-28 RX ADMIN — SENNOSIDES 8.6 MG: 8.6 TABLET, FILM COATED ORAL at 21:05

## 2023-07-28 ASSESSMENT — PAIN DESCRIPTION - LOCATION: LOCATION: BACK

## 2023-07-28 ASSESSMENT — PAIN SCALES - GENERAL
PAINLEVEL_OUTOF10: 2
PAINLEVEL_OUTOF10: 0
PAINLEVEL_OUTOF10: 6
PAINLEVEL_OUTOF10: 0
PAINLEVEL_OUTOF10: 0

## 2023-07-28 ASSESSMENT — PAIN DESCRIPTION - DESCRIPTORS: DESCRIPTORS: ACHING

## 2023-07-28 ASSESSMENT — PAIN DESCRIPTION - ORIENTATION: ORIENTATION: LOWER

## 2023-07-28 ASSESSMENT — PAIN DESCRIPTION - FREQUENCY: FREQUENCY: CONTINUOUS

## 2023-07-28 ASSESSMENT — PAIN DESCRIPTION - ONSET: ONSET: ON-GOING

## 2023-07-28 ASSESSMENT — PAIN DESCRIPTION - PAIN TYPE: TYPE: ACUTE PAIN

## 2023-07-28 ASSESSMENT — PAIN - FUNCTIONAL ASSESSMENT: PAIN_FUNCTIONAL_ASSESSMENT: PREVENTS OR INTERFERES WITH ALL ACTIVE AND SOME PASSIVE ACTIVITIES

## 2023-07-28 NOTE — SIGNIFICANT EVENT
Post-Op Fever    Paged by RN of fever w/ Temp 101F and repeat 100.4F. Notably tachycardic though has been w/ HR in 100s since admission, HD stable. Likely post-op fever, however will perform work-up to ensure no acute infection. Plan:  - CBC, BMP  - Chest Xray  - Tylenol for pain control    Will f/u results if worsening HD instability will expand wrk-up with blood cultures and consider broad spectrum antibiotics w/ MRSA coverage given recent surgery.     Carole Lenz MD  12:04 AM

## 2023-07-28 NOTE — PROGRESS NOTES
PT orders received and chart reviewed. Per IR, pending L1 kyphoplasty next week. Hold PT/OT until procedure. Notified Dr. Stevenson Cui. Discontinuing PT orders. Please re-order PT as appropriate for skilled PT treatment post procedure.

## 2023-07-28 NOTE — PROGRESS NOTES
conducted an initial consultation and Spiritual Assessment for Diya Harry, who is a 67 y. o.,male. Patient's Primary Language is: [place]. According to the patient's EMR Hinduism Affiliation is: [address] [facility].     The reason the Patient came to the hospital is:   Patient Active Problem List    Diagnosis Date Noted    Pain management 07/27/2023    Compression fracture of lumbar vertebra with delayed healing, unspecified lumbar vertebral level, subsequent encounter 07/27/2023    Backache without radiation 07/27/2023    Incisional hernia 10/31/2022    Lumbar spondylosis 08/14/2022    Lumbar stenosis without neurogenic claudication 08/14/2022    Severe obesity ([address]) 03/19/2019    Diverticulitis 04/16/2018    Other specified diseases of intestine 04/16/2018    S/P ablation of atrial fibrillation 11/08/2016    Hypercholesteremia 07/26/2010    Anal fissure 07/26/2010    HTN (hypertension) 07/26/2010        The  provided the following Interventions:  Initiated a relationship of care and support. Provided information about Spiritual Care Services. Offered prayer and assurance of continued prayers on patient's behalf. Chart reviewed. The following outcomes where achieved:   confirmed Patient's Hinduism Affiliation. Patient expressed gratitude for 's visit. Assessment:  Patient does not have any Yarsani/cultural needs that will affect patient's preferences in health care. There are no spiritual or Yarsani issues which require intervention at this time. Plan:  Chaplains will continue to follow and will provide pastoral care on an as needed/requested basis.  recommends bedside caregivers page  on duty if patient shows signs of acute spiritual or emotional distress.     [facility]   [phone]

## 2023-07-28 NOTE — PROGRESS NOTES
Pt transferred from 2 Surgical unit to [facility]. Skin swarm completed with Brandon Arnold RN. No skin breakdown noted Surgical incision noted with c/d/intact dressing. Pt oriented to room and use of call bell. Pt medicated with PRN Oxycodone prior to transfer.

## 2023-07-28 NOTE — PROGRESS NOTES
Patient arrived to unit. Direct admit. Oriented to  room. Vitals signs  stable. Denies  pain no  distress noted. Call bell within  reach. Will continue to  monitor.

## 2023-07-28 NOTE — PROGRESS NOTES
New OT orders received and chart reviewed. Unable to see pt for OT evaluation at this time due to:    Per IR note pt found to have L1 compression fx and now pending kyphoplasty next week. Per note pt is on bedrest and hold PT and OT until after procedure. Discussed with MD. We will sign off at this time. Please re-order OT after procedure when appropriate.  Thank you for this referral.    Tamiko Palmer MS, OTR/L

## 2023-07-28 NOTE — PROGRESS NOTES
Pt to be transferred to inpatient unit to stay over the weekend. Supervisor and hospitalist updated on [facility] stating Pt will stay. Dr. Sparkle Rivera made aware of need for transfer order.

## 2023-07-29 PROBLEM — S32.000A: Status: ACTIVE | Noted: 2023-07-29

## 2023-07-29 LAB
BASOPHILS # BLD: 0 K/UL (ref 0–0.1)
BASOPHILS NFR BLD: 0 % (ref 0–2)
DIFFERENTIAL METHOD BLD: ABNORMAL
EOSINOPHIL # BLD: 0 K/UL (ref 0–0.4)
EOSINOPHIL NFR BLD: 0 % (ref 0–5)
ERYTHROCYTE [DISTWIDTH] IN BLOOD BY AUTOMATED COUNT: 16.1 % (ref 11.6–14.5)
HCT VFR BLD AUTO: 25.5 % (ref 36–48)
HGB BLD-MCNC: 8.4 G/DL (ref 13–16)
IMM GRANULOCYTES # BLD AUTO: 0 K/UL (ref 0–0.04)
IMM GRANULOCYTES NFR BLD AUTO: 0 % (ref 0–0.5)
LYMPHOCYTES # BLD: 0.7 K/UL (ref 0.9–3.6)
LYMPHOCYTES NFR BLD: 14 % (ref 21–52)
MCH RBC QN AUTO: 33.7 PG (ref 24–34)
MCHC RBC AUTO-ENTMCNC: 32.9 G/DL (ref 31–37)
MCV RBC AUTO: 102.4 FL (ref 78–100)
MONOCYTES # BLD: 0.3 K/UL (ref 0.05–1.2)
MONOCYTES NFR BLD: 6 % (ref 3–10)
NEUTS SEG # BLD: 3.7 K/UL (ref 1.8–8)
NEUTS SEG NFR BLD: 79 % (ref 40–73)
NRBC # BLD: 0 K/UL (ref 0–0.01)
NRBC BLD-RTO: 0 PER 100 WBC
PLATELET # BLD AUTO: 176 K/UL (ref 135–420)
PMV BLD AUTO: 9.8 FL (ref 9.2–11.8)
RBC # BLD AUTO: 2.49 M/UL (ref 4.35–5.65)
WBC # BLD AUTO: 4.7 K/UL (ref 4.6–13.2)

## 2023-07-29 PROCEDURE — 1100000000 HC RM PRIVATE

## 2023-07-29 PROCEDURE — 85025 COMPLETE CBC W/AUTO DIFF WBC: CPT

## 2023-07-29 PROCEDURE — 99232 SBSQ HOSP IP/OBS MODERATE 35: CPT | Performed by: INTERNAL MEDICINE

## 2023-07-29 PROCEDURE — G0378 HOSPITAL OBSERVATION PER HR: HCPCS

## 2023-07-29 PROCEDURE — 36415 COLL VENOUS BLD VENIPUNCTURE: CPT

## 2023-07-29 PROCEDURE — 6370000000 HC RX 637 (ALT 250 FOR IP): Performed by: INTERNAL MEDICINE

## 2023-07-29 PROCEDURE — 2580000003 HC RX 258: Performed by: INTERNAL MEDICINE

## 2023-07-29 RX ADMIN — ROSUVASTATIN CALCIUM 20 MG: 20 TABLET, FILM COATED ORAL at 21:14

## 2023-07-29 RX ADMIN — METOPROLOL SUCCINATE 50 MG: 50 TABLET, FILM COATED, EXTENDED RELEASE ORAL at 08:17

## 2023-07-29 RX ADMIN — SODIUM CHLORIDE, PRESERVATIVE FREE 10 ML: 5 INJECTION INTRAVENOUS at 08:17

## 2023-07-29 RX ADMIN — SODIUM CHLORIDE, PRESERVATIVE FREE 10 ML: 5 INJECTION INTRAVENOUS at 21:15

## 2023-07-29 RX ADMIN — SENNOSIDES 8.6 MG: 8.6 TABLET, FILM COATED ORAL at 21:14

## 2023-07-29 RX ADMIN — TAMSULOSIN HYDROCHLORIDE 0.4 MG: 0.4 CAPSULE ORAL at 21:14

## 2023-07-29 RX ADMIN — PSYLLIUM HUSK 1 PACKET: 3.4 POWDER ORAL at 08:17

## 2023-07-29 ASSESSMENT — PAIN SCALES - GENERAL
PAINLEVEL_OUTOF10: 2
PAINLEVEL_OUTOF10: 0
PAINLEVEL_OUTOF10: 2
PAINLEVEL_OUTOF10: 0
PAINLEVEL_OUTOF10: 2
PAINLEVEL_OUTOF10: 0
PAINLEVEL_OUTOF10: 2

## 2023-07-30 ENCOUNTER — ANESTHESIA EVENT (OUTPATIENT)
Facility: HOSPITAL | Age: 73
DRG: 517 | End: 2023-07-30
Payer: MEDICARE

## 2023-07-30 PROCEDURE — 6370000000 HC RX 637 (ALT 250 FOR IP): Performed by: INTERNAL MEDICINE

## 2023-07-30 PROCEDURE — 99232 SBSQ HOSP IP/OBS MODERATE 35: CPT | Performed by: STUDENT IN AN ORGANIZED HEALTH CARE EDUCATION/TRAINING PROGRAM

## 2023-07-30 PROCEDURE — 2580000003 HC RX 258: Performed by: INTERNAL MEDICINE

## 2023-07-30 PROCEDURE — 51702 INSERT TEMP BLADDER CATH: CPT

## 2023-07-30 PROCEDURE — 1100000000 HC RM PRIVATE

## 2023-07-30 RX ORDER — SODIUM CHLORIDE 0.9 % (FLUSH) 0.9 %
5-40 SYRINGE (ML) INJECTION EVERY 12 HOURS SCHEDULED
OUTPATIENT
Start: 2023-07-30

## 2023-07-30 RX ORDER — SODIUM CHLORIDE 0.9 % (FLUSH) 0.9 %
5-40 SYRINGE (ML) INJECTION PRN
OUTPATIENT
Start: 2023-07-30

## 2023-07-30 RX ORDER — SODIUM CHLORIDE, SODIUM LACTATE, POTASSIUM CHLORIDE, CALCIUM CHLORIDE 600; 310; 30; 20 MG/100ML; MG/100ML; MG/100ML; MG/100ML
INJECTION, SOLUTION INTRAVENOUS CONTINUOUS
OUTPATIENT
Start: 2023-07-30

## 2023-07-30 RX ORDER — SODIUM CHLORIDE 9 MG/ML
INJECTION, SOLUTION INTRAVENOUS PRN
OUTPATIENT
Start: 2023-07-30

## 2023-07-30 RX ADMIN — SODIUM CHLORIDE, PRESERVATIVE FREE 10 ML: 5 INJECTION INTRAVENOUS at 21:46

## 2023-07-30 RX ADMIN — SODIUM CHLORIDE, PRESERVATIVE FREE 10 ML: 5 INJECTION INTRAVENOUS at 08:46

## 2023-07-30 RX ADMIN — TAMSULOSIN HYDROCHLORIDE 0.4 MG: 0.4 CAPSULE ORAL at 21:45

## 2023-07-30 RX ADMIN — ROSUVASTATIN CALCIUM 20 MG: 20 TABLET, FILM COATED ORAL at 21:45

## 2023-07-30 RX ADMIN — METOPROLOL SUCCINATE 50 MG: 50 TABLET, FILM COATED, EXTENDED RELEASE ORAL at 08:46

## 2023-07-30 RX ADMIN — ACETAMINOPHEN 325MG 650 MG: 325 TABLET ORAL at 19:40

## 2023-07-30 RX ADMIN — PSYLLIUM HUSK 1 PACKET: 3.4 POWDER ORAL at 08:46

## 2023-07-30 ASSESSMENT — PAIN SCALES - GENERAL
PAINLEVEL_OUTOF10: 2
PAINLEVEL_OUTOF10: 2
PAINLEVEL_OUTOF10: 0

## 2023-07-30 ASSESSMENT — PAIN SCALES - WONG BAKER: WONGBAKER_NUMERICALRESPONSE: 0

## 2023-07-30 ASSESSMENT — PAIN DESCRIPTION - LOCATION: LOCATION: BACK

## 2023-07-31 ENCOUNTER — ANESTHESIA (OUTPATIENT)
Facility: HOSPITAL | Age: 73
DRG: 517 | End: 2023-07-31
Payer: MEDICARE

## 2023-07-31 ENCOUNTER — HOSPITAL ENCOUNTER (INPATIENT)
Facility: HOSPITAL | Age: 73
Discharge: HOME | DRG: 517 | End: 2023-08-03
Payer: MEDICARE

## 2023-07-31 VITALS
OXYGEN SATURATION: 96 % | HEART RATE: 77 BPM | RESPIRATION RATE: 23 BRPM | TEMPERATURE: 98 F | DIASTOLIC BLOOD PRESSURE: 75 MMHG | SYSTOLIC BLOOD PRESSURE: 137 MMHG

## 2023-07-31 PROCEDURE — 6360000002 HC RX W HCPCS: Performed by: NURSE ANESTHETIST, CERTIFIED REGISTERED

## 2023-07-31 PROCEDURE — C1713 ANCHOR/SCREW BN/BN,TIS/BN: HCPCS

## 2023-07-31 PROCEDURE — 0QU03JZ SUPPLEMENT LUMBAR VERTEBRA WITH SYNTHETIC SUBSTITUTE, PERCUTANEOUS APPROACH: ICD-10-PCS | Performed by: RADIOLOGY

## 2023-07-31 PROCEDURE — 7100000000 HC PACU RECOVERY - FIRST 15 MIN

## 2023-07-31 PROCEDURE — 1100000000 HC RM PRIVATE

## 2023-07-31 PROCEDURE — 51798 US URINE CAPACITY MEASURE: CPT

## 2023-07-31 PROCEDURE — 2580000003 HC RX 258: Performed by: INTERNAL MEDICINE

## 2023-07-31 PROCEDURE — 2500000003 HC RX 250 WO HCPCS: Performed by: NURSE ANESTHETIST, CERTIFIED REGISTERED

## 2023-07-31 PROCEDURE — 6360000004 HC RX CONTRAST MEDICATION: Performed by: RADIOLOGY

## 2023-07-31 PROCEDURE — 2500000003 HC RX 250 WO HCPCS: Performed by: RADIOLOGY

## 2023-07-31 PROCEDURE — 7100000001 HC PACU RECOVERY - ADDTL 15 MIN

## 2023-07-31 PROCEDURE — 3700000000 HC ANESTHESIA ATTENDED CARE

## 2023-07-31 PROCEDURE — 2580000003 HC RX 258: Performed by: NURSE ANESTHETIST, CERTIFIED REGISTERED

## 2023-07-31 PROCEDURE — 99232 SBSQ HOSP IP/OBS MODERATE 35: CPT | Performed by: STUDENT IN AN ORGANIZED HEALTH CARE EDUCATION/TRAINING PROGRAM

## 2023-07-31 PROCEDURE — 3700000001 HC ADD 15 MINUTES (ANESTHESIA)

## 2023-07-31 PROCEDURE — 6370000000 HC RX 637 (ALT 250 FOR IP): Performed by: INTERNAL MEDICINE

## 2023-07-31 RX ORDER — FENTANYL CITRATE 50 UG/ML
INJECTION, SOLUTION INTRAMUSCULAR; INTRAVENOUS PRN
Status: DISCONTINUED | OUTPATIENT
Start: 2023-07-31 | End: 2023-07-31 | Stop reason: SDUPTHER

## 2023-07-31 RX ORDER — MIDAZOLAM HYDROCHLORIDE 1 MG/ML
INJECTION INTRAMUSCULAR; INTRAVENOUS PRN
Status: DISCONTINUED | OUTPATIENT
Start: 2023-07-31 | End: 2023-07-31 | Stop reason: SDUPTHER

## 2023-07-31 RX ORDER — LIDOCAINE HYDROCHLORIDE 20 MG/ML
INJECTION, SOLUTION EPIDURAL; INFILTRATION; INTRACAUDAL; PERINEURAL PRN
Status: DISCONTINUED | OUTPATIENT
Start: 2023-07-31 | End: 2023-07-31 | Stop reason: SDUPTHER

## 2023-07-31 RX ORDER — SODIUM CHLORIDE 0.9 % (FLUSH) 0.9 %
5-40 SYRINGE (ML) INJECTION PRN
Status: DISCONTINUED | OUTPATIENT
Start: 2023-07-31 | End: 2023-08-04 | Stop reason: HOSPADM

## 2023-07-31 RX ORDER — SODIUM CHLORIDE 9 MG/ML
INJECTION, SOLUTION INTRAVENOUS CONTINUOUS PRN
Status: DISCONTINUED | OUTPATIENT
Start: 2023-07-31 | End: 2023-07-31 | Stop reason: SDUPTHER

## 2023-07-31 RX ORDER — ONDANSETRON 2 MG/ML
4 INJECTION INTRAMUSCULAR; INTRAVENOUS
Status: ACTIVE | OUTPATIENT
Start: 2023-07-31 | End: 2023-08-01

## 2023-07-31 RX ORDER — CEFAZOLIN SODIUM 1 G/3ML
INJECTION, POWDER, FOR SOLUTION INTRAMUSCULAR; INTRAVENOUS PRN
Status: DISCONTINUED | OUTPATIENT
Start: 2023-07-31 | End: 2023-07-31 | Stop reason: SDUPTHER

## 2023-07-31 RX ORDER — FENTANYL CITRATE 50 UG/ML
50 INJECTION, SOLUTION INTRAMUSCULAR; INTRAVENOUS EVERY 5 MIN PRN
Status: DISCONTINUED | OUTPATIENT
Start: 2023-07-31 | End: 2023-08-04 | Stop reason: HOSPADM

## 2023-07-31 RX ORDER — PROPOFOL 10 MG/ML
INJECTION, EMULSION INTRAVENOUS CONTINUOUS PRN
Status: DISCONTINUED | OUTPATIENT
Start: 2023-07-31 | End: 2023-07-31 | Stop reason: SDUPTHER

## 2023-07-31 RX ORDER — DIPHENHYDRAMINE HYDROCHLORIDE 50 MG/ML
12.5 INJECTION INTRAMUSCULAR; INTRAVENOUS
Status: ACTIVE | OUTPATIENT
Start: 2023-07-31 | End: 2023-08-01

## 2023-07-31 RX ORDER — LIDOCAINE HYDROCHLORIDE 10 MG/ML
30 INJECTION, SOLUTION EPIDURAL; INFILTRATION; INTRACAUDAL; PERINEURAL ONCE
Status: COMPLETED | OUTPATIENT
Start: 2023-07-31 | End: 2023-07-31

## 2023-07-31 RX ADMIN — SENNOSIDES 8.6 MG: 8.6 TABLET, FILM COATED ORAL at 20:56

## 2023-07-31 RX ADMIN — PROPOFOL 75 MCG/KG/MIN: 10 INJECTION, EMULSION INTRAVENOUS at 14:04

## 2023-07-31 RX ADMIN — DEXMEDETOMIDINE HYDROCHLORIDE 6 MCG: 100 INJECTION, SOLUTION INTRAVENOUS at 13:55

## 2023-07-31 RX ADMIN — TAMSULOSIN HYDROCHLORIDE 0.4 MG: 0.4 CAPSULE ORAL at 20:56

## 2023-07-31 RX ADMIN — FENTANYL CITRATE 25 MCG: 50 INJECTION INTRAMUSCULAR; INTRAVENOUS at 14:06

## 2023-07-31 RX ADMIN — MIDAZOLAM 2 MG: 1 INJECTION, SOLUTION INTRAMUSCULAR; INTRAVENOUS at 13:56

## 2023-07-31 RX ADMIN — IOHEXOL 50 ML: 300 INJECTION, SOLUTION INTRAVENOUS at 15:04

## 2023-07-31 RX ADMIN — SODIUM CHLORIDE, PRESERVATIVE FREE 10 ML: 5 INJECTION INTRAVENOUS at 20:57

## 2023-07-31 RX ADMIN — METOPROLOL SUCCINATE 50 MG: 50 TABLET, FILM COATED, EXTENDED RELEASE ORAL at 08:11

## 2023-07-31 RX ADMIN — CEFAZOLIN 2 G: 330 INJECTION, POWDER, FOR SOLUTION INTRAMUSCULAR; INTRAVENOUS at 14:04

## 2023-07-31 RX ADMIN — LIDOCAINE HYDROCHLORIDE 30 ML: 10 INJECTION, SOLUTION EPIDURAL; INFILTRATION; INTRACAUDAL; PERINEURAL at 15:04

## 2023-07-31 RX ADMIN — SODIUM CHLORIDE: 9 INJECTION, SOLUTION INTRAVENOUS at 13:54

## 2023-07-31 RX ADMIN — LIDOCAINE HYDROCHLORIDE 60 MG: 20 INJECTION, SOLUTION EPIDURAL; INFILTRATION; INTRACAUDAL; PERINEURAL at 13:59

## 2023-07-31 RX ADMIN — ROSUVASTATIN CALCIUM 20 MG: 20 TABLET, FILM COATED ORAL at 20:56

## 2023-07-31 RX ADMIN — FENTANYL CITRATE 25 MCG: 50 INJECTION INTRAMUSCULAR; INTRAVENOUS at 14:17

## 2023-07-31 RX ADMIN — SODIUM CHLORIDE, PRESERVATIVE FREE 10 ML: 5 INJECTION INTRAVENOUS at 08:14

## 2023-07-31 RX ADMIN — FENTANYL CITRATE 50 MCG: 50 INJECTION INTRAMUSCULAR; INTRAVENOUS at 13:58

## 2023-07-31 ASSESSMENT — PAIN DESCRIPTION - ORIENTATION: ORIENTATION: MID

## 2023-07-31 ASSESSMENT — PAIN DESCRIPTION - LOCATION: LOCATION: BACK

## 2023-07-31 ASSESSMENT — PAIN SCALES - GENERAL
PAINLEVEL_OUTOF10: 0
PAINLEVEL_OUTOF10: 0
PAINLEVEL_OUTOF10: 2
PAINLEVEL_OUTOF10: 0
PAINLEVEL_OUTOF10: 2

## 2023-07-31 ASSESSMENT — PAIN DESCRIPTION - PAIN TYPE: TYPE: CHRONIC PAIN

## 2023-07-31 NOTE — ANESTHESIA PRE PROCEDURE
Department of Anesthesiology  Preprocedure Note       Name:  Allyson Peguero   Age:  67 y.o.  :  1950                                          MRN:  [id number]         Date:  2023      Surgeon: * No surgeons listed *    Procedure: * No procedures listed *    Medications prior to admission:   Prior to Admission medications    Medication Sig Start Date End Date Taking? Authorizing Provider   ibuprofen (ADVIL;MOTRIN) 600 MG tablet Take 1 tablet by mouth 4 times daily as needed for Pain 23   Cynthai Starr MD   lidocaine (LIDODERM) 5 % Place 1 patch onto the skin daily for 15 days 12 hours on, 12 hours off. 23  Cynthia Starr MD   tamsulosin (FLOMAX) 0.4 MG capsule Take 1 capsule by mouth daily 23   Cynthia Starr MD   gabapentin (NEURONTIN) 100 MG capsule Take 1 capsule by mouth 3 times daily as needed (pain) for up to 60 days. Intended supply: 30 days Max Daily Amount: 300 mg 23  Estela Fraser MD   methylPREDNISolone (MEDROL DOSEPACK) 4 MG tablet Take by mouth as directed w/food. Do not combine /NSAIDS. May increase blood pressure and blood glucose.  23   Estela Fraser MD   Psyllium (METAMUCIL PO) Take 1 Package by mouth daily    Historical Provider, MD   Ascorbic Acid (VITAMIN C) 100 MG CHEW Take 1 tablet by mouth daily    Historical Provider, MD   Omega-3 Fatty Acids (FISH OIL PO) Take 1 capsule by mouth daily    Historical Provider, MD   Multiple Vitamins-Minerals (CENTRUM SILVER PO) Take 1 tablet by mouth daily  Patient not taking: Reported on 2023    Historical Provider, MD   cyclobenzaprine (FLEXERIL) 5 MG tablet Take 1 tablet by mouth 2 times daily as needed 22   Ar Automatic Reconciliation   loratadine (CLARITIN) 10 MG tablet Take 1 tablet by mouth daily as needed    Ar Automatic Reconciliation   metoprolol succinate (TOPROL XL) 50 MG extended release tablet TAKE 1 TABLET BY MOUTH DAILY 22   Ar Automatic Reconciliation   montelukast (SINGULAIR)

## 2023-07-31 NOTE — ANESTHESIA POSTPROCEDURE EVALUATION
Department of Anesthesiology  Postprocedure Note    Patient: Jose Cruz  MRN: [id number]  YOB: 1950  Date of evaluation: 7/31/2023      Procedure Summary     Date: 07/31/23 Room / Location: [address] [place] [address]; [facility] CATH LAB    Anesthesia Start: 1354 Anesthesia Stop: 9440    Procedure: IR KYPHOPLASTY LUMBAR FIRST LEVEL Diagnosis: (L1 kyphoplasty)    Scheduled Providers:  Responsible Provider: Kuldip Bellamy DO    Anesthesia Type: MAC ASA Status: 3          Anesthesia Type: MAC    Megan Phase I: Megan Score: 10    Megan Phase II:        Anesthesia Post Evaluation    Patient location during evaluation: PACU  Patient participation: complete - patient participated  Level of consciousness: awake and alert  Airway patency: patent  Nausea & Vomiting: no nausea and no vomiting  Complications: no  Cardiovascular status: hemodynamically stable  Respiratory status: acceptable  Hydration status: stable  Multimodal analgesia pain management approach

## 2023-07-31 NOTE — PROGRESS NOTES
Preprocedure Assessment      Today 7/31/2023     Indication/Symptoms:  Monty Hernandes is a 67 y.o.male with acute compression fracture of L1 here for a L1 kyphoplasty with anesthesia support. The H & P and/or progress notes and any available imaging were reviewed. The risks, indications and possible alternatives to the procedure, including doing nothing, were discussed and informed consent was obtained.     Physical Exam:    Mallampati 2 ASA 3   General: A&O x 4, NAD   Heart:   RRR  Lungs:    Normal work of breathing    The patient is an appropriate candidate to undergo the planned procedure    CHUYITA Banegas
Health Care Proxy (HCP)

## 2023-07-31 NOTE — PERIOP NOTE
TRANSFER - OUT REPORT:    Verbal report given to Olga Lee on Alise Darling  being transferred to room 508 for routine progression of patient care       Report consisted of patient's Situation, Background, Assessment and   Recommendations(SBAR). Information from the following report(s) Nurse Handoff Report, Surgery Report, Intake/Output, and MAR was reviewed with the receiving nurse. Lines:   Peripheral IV 07/27/23 Right Forearm (Active)   Site Assessment Clean, dry & intact 07/31/23 1509   Line Status Infusing 07/31/23 [facility] Connections checked and tightened 07/31/23 0800   Phlebitis Assessment No symptoms 07/31/23 1509   Infiltration Assessment 0 07/31/23 1509   Alcohol Cap Used No 07/31/23 1300   Dressing Status Clean, dry & intact 07/31/23 1509   Dressing Type Transparent 07/31/23 1509        Opportunity for questions and clarification was provided.       Patient transported with:  [employer]

## 2023-07-31 NOTE — PROCEDURES
RADIOLOGY POST PROCEDURE NOTE     July 31, 2023       4:02 PM     Preoperative Diagnosis:   Back pain, L1 compression fracture    Postoperative Diagnosis:  Same. : Dr. Mati Griffin MD,  Morro Dow MD    Assistant:  None. Type of Anesthesia: 1% plain lidocaine and moderate sedation    Procedure/Description:  Image-guided L1 Kyphoplasty    Findings:   Successful L1 Kyphoplasty. Estimated blood Loss:  Minimal    Specimen Removed:  none    Blood transfusions:  None. Implants:  None.     Complications: None    Condition: Stable    Blood thinning medications: OK to resume as clinically indicated    Plan:    Return to inpatient care team  3 hours of bedrest    Morro Dow MD

## 2023-08-01 PROCEDURE — 97116 GAIT TRAINING THERAPY: CPT

## 2023-08-01 PROCEDURE — 2580000003 HC RX 258: Performed by: INTERNAL MEDICINE

## 2023-08-01 PROCEDURE — 94761 N-INVAS EAR/PLS OXIMETRY MLT: CPT

## 2023-08-01 PROCEDURE — 51798 US URINE CAPACITY MEASURE: CPT

## 2023-08-01 PROCEDURE — 99232 SBSQ HOSP IP/OBS MODERATE 35: CPT | Performed by: STUDENT IN AN ORGANIZED HEALTH CARE EDUCATION/TRAINING PROGRAM

## 2023-08-01 PROCEDURE — 1100000000 HC RM PRIVATE

## 2023-08-01 PROCEDURE — 97161 PT EVAL LOW COMPLEX 20 MIN: CPT

## 2023-08-01 PROCEDURE — 97165 OT EVAL LOW COMPLEX 30 MIN: CPT

## 2023-08-01 PROCEDURE — 6370000000 HC RX 637 (ALT 250 FOR IP): Performed by: INTERNAL MEDICINE

## 2023-08-01 PROCEDURE — 97530 THERAPEUTIC ACTIVITIES: CPT

## 2023-08-01 RX ADMIN — ROSUVASTATIN CALCIUM 20 MG: 20 TABLET, FILM COATED ORAL at 20:48

## 2023-08-01 RX ADMIN — SODIUM CHLORIDE, PRESERVATIVE FREE 10 ML: 5 INJECTION INTRAVENOUS at 08:17

## 2023-08-01 RX ADMIN — OXYCODONE HYDROCHLORIDE AND ACETAMINOPHEN 2 TABLET: 5; 325 TABLET ORAL at 11:06

## 2023-08-01 RX ADMIN — SENNOSIDES 8.6 MG: 8.6 TABLET, FILM COATED ORAL at 20:48

## 2023-08-01 RX ADMIN — SODIUM CHLORIDE, PRESERVATIVE FREE 10 ML: 5 INJECTION INTRAVENOUS at 20:49

## 2023-08-01 RX ADMIN — ACETAMINOPHEN 325MG 650 MG: 325 TABLET ORAL at 08:16

## 2023-08-01 RX ADMIN — METOPROLOL SUCCINATE 50 MG: 50 TABLET, FILM COATED, EXTENDED RELEASE ORAL at 08:16

## 2023-08-01 RX ADMIN — TAMSULOSIN HYDROCHLORIDE 0.4 MG: 0.4 CAPSULE ORAL at 20:49

## 2023-08-01 ASSESSMENT — PAIN SCALES - GENERAL
PAINLEVEL_OUTOF10: 0
PAINLEVEL_OUTOF10: 1
PAINLEVEL_OUTOF10: 4
PAINLEVEL_OUTOF10: 3

## 2023-08-01 ASSESSMENT — PAIN SCALES - WONG BAKER
WONGBAKER_NUMERICALRESPONSE: 0
WONGBAKER_NUMERICALRESPONSE: 2

## 2023-08-01 ASSESSMENT — PAIN DESCRIPTION - ORIENTATION
ORIENTATION: LOWER
ORIENTATION: LOWER;RIGHT;LEFT
ORIENTATION: LOWER

## 2023-08-01 ASSESSMENT — PAIN DESCRIPTION - ONSET: ONSET: ON-GOING

## 2023-08-01 ASSESSMENT — PAIN DESCRIPTION - DESCRIPTORS
DESCRIPTORS: DISCOMFORT
DESCRIPTORS: DISCOMFORT

## 2023-08-01 ASSESSMENT — PAIN - FUNCTIONAL ASSESSMENT: PAIN_FUNCTIONAL_ASSESSMENT: PREVENTS OR INTERFERES SOME ACTIVE ACTIVITIES AND ADLS

## 2023-08-01 ASSESSMENT — PAIN DESCRIPTION - LOCATION
LOCATION: BACK

## 2023-08-01 ASSESSMENT — PAIN DESCRIPTION - PAIN TYPE: TYPE: CHRONIC PAIN

## 2023-08-01 ASSESSMENT — PAIN DESCRIPTION - FREQUENCY: FREQUENCY: CONTINUOUS

## 2023-08-02 ENCOUNTER — HOME HEALTH ADMISSION (OUTPATIENT)
Age: 73
End: 2023-08-02
Payer: MEDICARE

## 2023-08-02 VITALS
BODY MASS INDEX: 34.51 KG/M2 | HEART RATE: 79 BPM | OXYGEN SATURATION: 97 % | WEIGHT: 233 LBS | RESPIRATION RATE: 20 BRPM | TEMPERATURE: 98.3 F | DIASTOLIC BLOOD PRESSURE: 62 MMHG | SYSTOLIC BLOOD PRESSURE: 103 MMHG | HEIGHT: 69 IN

## 2023-08-02 PROCEDURE — 2580000003 HC RX 258: Performed by: INTERNAL MEDICINE

## 2023-08-02 PROCEDURE — 97116 GAIT TRAINING THERAPY: CPT

## 2023-08-02 PROCEDURE — 97530 THERAPEUTIC ACTIVITIES: CPT

## 2023-08-02 PROCEDURE — 97535 SELF CARE MNGMENT TRAINING: CPT

## 2023-08-02 PROCEDURE — 6370000000 HC RX 637 (ALT 250 FOR IP): Performed by: INTERNAL MEDICINE

## 2023-08-02 PROCEDURE — 99238 HOSP IP/OBS DSCHRG MGMT 30/<: CPT | Performed by: STUDENT IN AN ORGANIZED HEALTH CARE EDUCATION/TRAINING PROGRAM

## 2023-08-02 RX ORDER — OXYCODONE HYDROCHLORIDE AND ACETAMINOPHEN 5; 325 MG/1; MG/1
2 TABLET ORAL EVERY 6 HOURS PRN
Qty: 12 TABLET | Refills: 0 | Status: SHIPPED
Start: 2023-08-02 | End: 2023-08-02 | Stop reason: HOSPADM

## 2023-08-02 RX ORDER — ACETAMINOPHEN 500 MG
500 TABLET ORAL 4 TIMES DAILY PRN
Qty: 360 TABLET | Refills: 1 | Status: SHIPPED | OUTPATIENT
Start: 2023-08-02

## 2023-08-02 RX ORDER — METHOCARBAMOL 750 MG/1
750 TABLET, FILM COATED ORAL 4 TIMES DAILY
Qty: 40 TABLET | Refills: 0 | Status: SHIPPED | OUTPATIENT
Start: 2023-08-02 | End: 2023-08-12

## 2023-08-02 RX ORDER — IBUPROFEN 600 MG/1
600 TABLET ORAL EVERY 8 HOURS PRN
Qty: 40 TABLET | Refills: 0 | Status: SHIPPED | OUTPATIENT
Start: 2023-08-02

## 2023-08-02 RX ADMIN — PSYLLIUM HUSK 1 PACKET: 3.4 POWDER ORAL at 08:10

## 2023-08-02 RX ADMIN — SODIUM CHLORIDE, PRESERVATIVE FREE 10 ML: 5 INJECTION INTRAVENOUS at 08:10

## 2023-08-02 RX ADMIN — ACETAMINOPHEN 325MG 650 MG: 325 TABLET ORAL at 08:14

## 2023-08-02 RX ADMIN — METOPROLOL SUCCINATE 50 MG: 50 TABLET, FILM COATED, EXTENDED RELEASE ORAL at 08:10

## 2023-08-02 ASSESSMENT — PAIN SCALES - GENERAL
PAINLEVEL_OUTOF10: 3
PAINLEVEL_OUTOF10: 0

## 2023-08-02 ASSESSMENT — PAIN SCALES - WONG BAKER: WONGBAKER_NUMERICALRESPONSE: 0

## 2023-08-02 ASSESSMENT — PAIN DESCRIPTION - LOCATION: LOCATION: BACK

## 2023-08-02 ASSESSMENT — PAIN DESCRIPTION - DESCRIPTORS: DESCRIPTORS: SHOOTING

## 2023-08-02 ASSESSMENT — PAIN DESCRIPTION - ORIENTATION: ORIENTATION: RIGHT;LEFT;LOWER

## 2023-08-02 NOTE — PROGRESS NOTES
Discharge order noted for today. Pt has been accepted to [facility] agency. Met with patient and wife and are agreeable to the transition plan today. Transport has been arranged through patient family. Patient's discharge summary and home health  orders have been forwarded to [facility] home health  agency via [facility]. Updated bedside RN, Sim,  to the transition plan.   Discharge information has been documented on the AVS.       CLARITA Guerrero  Care Management

## 2023-08-02 NOTE — CARE COORDINATION
08/02/23 1254   IMM Letter   IMM Letter given to Patient/Family/Significant other/Guardian/POA/by: patient   IMM Letter date given: 08/02/23   IMM Letter time given: 1245     Important Message from Angela" reviewed and explained with the patient and/or representative patient wife at bedside and signature was obtained. A signed copy provided to patient/representative. Original signed document placed in patient's chart.       CLARITA Guerrero  Care Management

## 2023-08-02 NOTE — PROGRESS NOTES
Discharge orders received, IV removed, patient alert and oriented, wife at bedside. AVS reviewed with patient and his wife,all questions encouraged and answered,patient taken downstairs via wheelchair by staff.

## 2023-08-02 NOTE — CARE COORDINATION
SW met with patient and spouse at bedside. Pt is agreeable to [facility] and a walker. FOC signed for home health and placed in chart. IMM letter competed. [facility] contacted and left a message to see if they are able to accept patient. [facility] [place]) reviewed patient and they are able to accept. Ronni Uriostegui ordered in [employer] and delivered to patient.        CLARITA Guerrero  Care Management

## 2023-08-02 NOTE — PLAN OF CARE
Problem: Occupational Therapy - Adult  Goal: By Discharge: Performs self-care activities at highest level of function for planned discharge setting. See evaluation for individualized goals. Description: Occupational Therapy Goals:  Initiated 8/1/2023 to be met within 7-10 days. 1.  Patient will perform lower body dressing with modified independence. 2.  Patient will perform bathing with modified independence. 3.  Patient will perform grooming  with modified independence standing at sink >5 mnutes. 4.  Patient will perform toilet transfers with modified independence. 5.  Patient will perform all aspects of toileting with modified independence. 6.  Patient will participate in upper extremity therapeutic exercise/activities with modified independence for 8-10 minutes to increase strength/endurance for ADLs. 7.  Patient will utilize energy conservation techniques during functional activities with verbal and visual cues. PLOF: Pt lives with wife in a 2 story home. No AE. Independent in self care tasks. Outcome: Progressing    OCCUPATIONAL THERAPY EVALUATION    Patient: Paul Forrester (79 y.o. male)  Date: 8/1/2023  Primary Diagnosis: Pain management [R52]  Compression fracture of lumbar vertebra with delayed healing, unspecified lumbar vertebral level, subsequent encounter [S32.000G]  Backache without radiation [M54.9]  Closed compression fracture of lumbosacral spine, initial encounter ([address]) [S32.000A]   5 Days Post-Op   Precautions: Fall Risk    ASSESSMENT :  Pt was cleared for OT evaluation and pt agreeable. Seen with PT to increase safety and participation. Required CGA for STS txr due to pain, once in standing required supervision for functional mobility. Walked in hallway with RW without difficulty, attempted to complete stairs and yelled out in pain. Pt requested to return to sitting. Declined to demo ADLs due to increased pain. CGA to return to recliner chair.  Left pt semi-reclined in
Problem: Occupational Therapy - Adult  Goal: By Discharge: Performs self-care activities at highest level of function for planned discharge setting. See evaluation for individualized goals. Description: Occupational Therapy Goals:  Initiated 8/1/2023 to be met within 7-10 days. 1.  Patient will perform lower body dressing with modified independence. 2.  Patient will perform bathing with modified independence. 3.  Patient will perform grooming  with modified independence standing at sink >5 mnutes. 4.  Patient will perform toilet transfers with modified independence. 5.  Patient will perform all aspects of toileting with modified independence. 6.  Patient will participate in upper extremity therapeutic exercise/activities with modified independence for 8-10 minutes to increase strength/endurance for ADLs. 7.  Patient will utilize energy conservation techniques during functional activities with verbal and visual cues. PLOF: Pt lives with wife in a 2 story home. No AE. Independent in self care tasks. Outcome: Progressing   OCCUPATIONAL THERAPY TREATMENT    Patient: Monty Hernandes (20 y.o. male)  Date: 8/2/2023  Diagnosis: Pain management [R52]  Compression fracture of lumbar vertebra with delayed healing, unspecified lumbar vertebral level, subsequent encounter [S32.000G]  Backache without radiation [M54.9]  Closed compression fracture of lumbosacral spine, initial encounter ([address]) [S32.000A] Backache without radiation   6 Days Post-Op  Precautions: Fall Risk,  ,  ,  ,  ,  ,  ,      Chart, occupational therapy assessment, plan of care, and goals were reviewed. ASSESSMENT:  Pt presented sitting upright in chair upon entry and agreeable for participation. Reviewed spinal precaution and educated on importance of utilizing log roll technique during functional bed mobility. Pt able to matthew his socks SBA w/ leg cross technique utilizing one hand method.  Pt issued and educated on AE for LB ADL's (
Problem: Pain  Goal: Verbalizes/displays adequate comfort level or baseline comfort level  7/31/2023 0669 by Promise Robin RN  Outcome: Progressing  Flowsheets (Taken 7/31/2023 2000)  Verbalizes/displays adequate comfort level or baseline comfort level:   Assess pain using appropriate pain scale   Encourage patient to monitor pain and request assistance  7/31/2023 0918 by Rebecca Kan RN  Outcome: Progressing     Problem: Discharge Planning  Goal: Discharge to home or other facility with appropriate resources  Outcome: Progressing  Flowsheets (Taken 7/31/2023 2000)  Discharge to home or other facility with appropriate resources:   Identify barriers to discharge with patient and caregiver   Arrange for needed discharge resources and transportation as appropriate     Problem: Safety - Adult  Goal: Free from fall injury  7/31/2023 2652 by Promise Robin RN  Outcome: Progressing  7/31/2023 0918 by Rebecca Kan RN  Outcome: Progressing     Problem: Skin/Tissue Integrity  Goal: Absence of new skin breakdown  Description: 1. Monitor for areas of redness and/or skin breakdown  2. Assess vascular access sites hourly  3. Every 4-6 hours minimum:  Change oxygen saturation probe site  4. Every 4-6 hours:  If on nasal continuous positive airway pressure, respiratory therapy assess nares and determine need for appliance change or resting period.   7/31/2023 4227 by Promise Robin RN  Outcome: Progressing  7/31/2023 0918 by Rebecca Kan RN  Outcome: Progressing     Problem: ABCDS Injury Assessment  Goal: Absence of physical injury  7/31/2023 3577 by Promise Robin RN  Outcome: Progressing  7/31/2023 0918 by Rebecca Kan RN  Outcome: Progressing
Problem: Pain  Goal: Verbalizes/displays adequate comfort level or baseline comfort level  7/31/2023 0918 by Claritza Montez RN  Outcome: Progressing  7/30/2023 2244 by Frannie Bautista RN  Outcome: Progressing  Flowsheets (Taken 7/30/2023 1942)  Verbalizes/displays adequate comfort level or baseline comfort level: Encourage patient to monitor pain and request assistance     Problem: Discharge Planning  Goal: Discharge to home or other facility with appropriate resources  7/30/2023 2244 by Frannie Bautista RN  Outcome: Progressing  Flowsheets  Taken 7/30/2023 2240 by Frannie Bautista RN  Discharge to home or other facility with appropriate resources: Identify barriers to discharge with patient and caregiver  Taken 7/30/2023 0900 by Marco Jj RN  Discharge to home or other facility with appropriate resources: Identify barriers to discharge with patient and caregiver     Problem: Safety - Adult  Goal: Free from fall injury  7/31/2023 0918 by Claritza Montez RN  Outcome: Progressing  7/30/2023 2244 by Frannie Bautista RN  Outcome: Progressing  Flowsheets (Taken 7/30/2023 2243)  Free From Fall Injury: Juliet Arndt family/caregiver on patient safety     Problem: Skin/Tissue Integrity  Goal: Absence of new skin breakdown  Description: 1. Monitor for areas of redness and/or skin breakdown  2. Assess vascular access sites hourly  3. Every 4-6 hours minimum:  Change oxygen saturation probe site  4. Every 4-6 hours:  If on nasal continuous positive airway pressure, respiratory therapy assess nares and determine need for appliance change or resting period.   7/31/2023 0918 by Claritza Montez RN  Outcome: Progressing  7/30/2023 2244 by Frannie Bautista RN  Outcome: Progressing     Problem: ABCDS Injury Assessment  Goal: Absence of physical injury  7/31/2023 0918 by Claritza Montez RN  Outcome: Progressing  7/30/2023 2244 by Frannie Bautista RN  Outcome: Progressing  Flowsheets (Taken 7/30/2023 2243)  Absence of
Problem: Pain  Goal: Verbalizes/displays adequate comfort level or baseline comfort level  8/1/2023 0345 by Zuly Ralph RN  Outcome: Progressing  Flowsheets (Taken 8/1/2023 1945)  Verbalizes/displays adequate comfort level or baseline comfort level:   Encourage patient to monitor pain and request assistance   Assess pain using appropriate pain scale  8/1/2023 9978 by Zuly Ralph RN  Outcome: Progressing  Flowsheets  Taken 8/1/2023 1150 by Miguelangel Harrington RN  Verbalizes/displays adequate comfort level or baseline comfort level: Encourage patient to monitor pain and request assistance  Taken 8/1/2023 0900 by Miguelangel Harrington RN  Verbalizes/displays adequate comfort level or baseline comfort level: Encourage patient to monitor pain and request assistance  Taken 8/1/2023 0816 by Miguelangel Harrington RN  Verbalizes/displays adequate comfort level or baseline comfort level: Encourage patient to monitor pain and request assistance     Problem: Discharge Planning  Goal: Discharge to home or other facility with appropriate resources  8/1/2023 3256 by Zuly Ralph RN  Outcome: Progressing  8/1/2023 7650 by Zuly Ralph RN  Outcome: Progressing  Flowsheets (Taken 8/1/2023 0900 by Miguelangel Harrington RN)  Discharge to home or other facility with appropriate resources: Identify barriers to discharge with patient and caregiver     Problem: Safety - Adult  Goal: Free from fall injury  8/1/2023 9313 by Zuly Ralph RN  Outcome: Progressing  8/1/2023 4988 by Zuly Ralph RN  Outcome: Progressing     Problem: Skin/Tissue Integrity  Goal: Absence of new skin breakdown  Description: 1. Monitor for areas of redness and/or skin breakdown  2. Assess vascular access sites hourly  3. Every 4-6 hours minimum:  Change oxygen saturation probe site  4. Every 4-6 hours:  If on nasal continuous positive airway pressure, respiratory therapy assess nares and determine need for appliance change or resting period.   8/1/2023 1954 by Derrick Gallardo
Problem: Pain  Goal: Verbalizes/displays adequate comfort level or baseline comfort level  Outcome: Progressing  Flowsheets (Taken 7/27/2023 1730 by Margarita Roe RN)  Verbalizes/displays adequate comfort level or baseline comfort level: Encourage patient to monitor pain and request assistance     Problem: Discharge Planning  Goal: Discharge to home or other facility with appropriate resources  Outcome: Progressing  Flowsheets (Taken 7/27/2023 2000)  Discharge to home or other facility with appropriate resources: Identify barriers to discharge with patient and caregiver     Problem: Safety - Adult  Goal: Free from fall injury  Outcome: Progressing     Problem: Skin/Tissue Integrity  Goal: Absence of new skin breakdown  Description: 1. Monitor for areas of redness and/or skin breakdown  2. Assess vascular access sites hourly  3. Every 4-6 hours minimum:  Change oxygen saturation probe site  4. Every 4-6 hours:  If on nasal continuous positive airway pressure, respiratory therapy assess nares and determine need for appliance change or resting period.   Outcome: Progressing     Problem: ABCDS Injury Assessment  Goal: Absence of physical injury  Outcome: Progressing
Problem: Physical Therapy - Adult  Goal: By Discharge: Performs mobility at highest level of function for planned discharge setting. See evaluation for individualized goals. Description: Physical Therapy Goals:  Initiated 8/1/2023 to be met within 7-10 days. 1.  Patient will move from supine to sit and sit to supine , scoot up and down, and roll side to side in bed with modified independence. 2.  Patient will transfer from bed to chair and chair to bed with modified independence using the least restrictive device. 3.  Patient will perform sit to stand with modified independence. 4.  Patient will ambulate with modified independence for 150 feet with the least restrictive device. 5.  Patient will ascend/descend 12 stairs with unilateral handrail(s) with modified independence. PLOF: Pt independent but reports decreased movement in last several weeks due to back pain. Lives with wife in 2 story house with several steps to enter. Outcome: Progressing     PHYSICAL THERAPY TREATMENT    Patient: Jose C Farrar (56 y.o. male)  Date: 8/2/2023  Diagnosis: Pain management [R52]  Compression fracture of lumbar vertebra with delayed healing, unspecified lumbar vertebral level, subsequent encounter [S32.000G]  Backache without radiation [M54.9]  Closed compression fracture of lumbosacral spine, initial encounter ([address]) [S32.000A] Backache without radiation   6 Days Post-Op  Precautions: Fall Risk, Spinal Precautions: No Bending, No Lifting, No Twisting      ASSESSMENT:  Pt received in recliner in NAD. Pt educated on spinal precautions as well as log roll 2/2 reports of difficulty getting OOB. Pt stands to RW with SBA and tolerated approx 50 ft of gait however reported fatigue and needed to return to sitting, for recovery. (Amb around whole unit with nursing prior to arrival). Pt then stood and amb to/from stairs approx 40 ft. Pt goes up/down with step to step pattern with SBA and verbal cues for sequencing.  Pt
movement in last several weeks due to back pain. Lives with wife in 2 story house with several steps to enter.       8/2/2023 1150 by Domo Sawyer, PT  Outcome: Progressing
towards steps, tearful and unable to complete stair training at this time. Returned to room, sitting on EOB, recliner brought to bedside. CGA for transfer to recliner. Pt positioned for comfort and educated to call for assist before getting up, pt verbalized understanding. Pt left with all needs met and call bell in reach. RN notified of position and participation. DEFICITS/IMPAIRMENTS:    , Body Structures, Functions, Activity Limitations Requiring Skilled Therapeutic Intervention: Decreased functional mobility ; Decreased balance;Decreased posture    Patient will benefit from skilled intervention to address the above impairments. Patient's rehabilitation potential/Therapy Prognosis: Good. Factors which may influence rehabilitation potential include:   []         None noted  []         Mental ability/status  []         Medical condition  []         Home/family situation and support systems  []         Safety awareness  [x]         Pain tolerance/management  []         Other:      PLAN :  Recommendations and Planned Interventions:   [x]           Bed Mobility Training             [x]    Neuromuscular Re-Education  [x]           Transfer Training                   []    Orthotic/Prosthetic Training  [x]           Gait Training                          []    Modalities  [x]           Therapeutic Exercises           []    Edema Management/Control  [x]           Therapeutic Activities            [x]    Family Training/Education  [x]           Patient Education  []           Other (comment):    Frequency/Duration: Patient will be followed by physical therapy to address goals, 1-2 times per day/3-5 days per week to address goals. Further Equipment Recommendations for Discharge: rolling walker    AMPAC:    18    Current research shows that an AM-PAC score of 18 (14 without stairs) or greater is associated with a discharge to the patient's home setting.  Based on an AM-PAC score and their current functional

## 2023-08-02 NOTE — PROGRESS NOTES
Physical Therapy    Pt safe for discharge home from PT, needs RW and recommend HH PT. Full note to follow.  Cash Umana, PT, DPT

## 2023-08-02 NOTE — HOME CARE
Home care referral received for SN PT OT. Chart reviewed. Met with to Patient and spouse in room. Verified address and telephone numbers. Explained services ordered and agency routines. Orders noted and arranged. Discussed DME that the patient already owns or ordered. Noted front wheeled walker in room  Left agency contact information.        Amy Dodge RN, BSN   [employer]

## 2023-08-04 ENCOUNTER — HOME CARE VISIT (OUTPATIENT)
Age: 73
End: 2023-08-04

## 2023-08-04 VITALS
DIASTOLIC BLOOD PRESSURE: 78 MMHG | SYSTOLIC BLOOD PRESSURE: 138 MMHG | TEMPERATURE: 98.7 F | OXYGEN SATURATION: 98 % | RESPIRATION RATE: 18 BRPM | HEART RATE: 88 BPM

## 2023-08-04 PROCEDURE — G0299 HHS/HOSPICE OF RN EA 15 MIN: HCPCS

## 2023-08-04 PROCEDURE — 0221000100 HH NO PAY CLAIM PROCEDURE

## 2023-08-04 PROCEDURE — G0151 HHCP-SERV OF PT,EA 15 MIN: HCPCS

## 2023-08-04 ASSESSMENT — ENCOUNTER SYMPTOMS
HEMOPTYSIS: 0
PAIN LOCATION - PAIN QUALITY: ACHING

## 2023-08-04 NOTE — HOME HEALTH
PT INITIAL EVALUATION    Past Medical Hx:   Sever OA    Intractable back pain    multilple compression fx    HTN    P AFIB         Recent H/o current illness:  67 year old male presents with MD referral for HHPT s/p hospitalization due to L2L4 kyphoplasy  Medication Management: wife is managing medications  Social hx and home eval:  Pt lives in 2 story home with wife. Caregiver Involvement: assist with ADL's, medical appointments  PLOF:  Pt PLOF is ambulation w no AD, pts base level of function independent with all ADL's  BALANCE:     Seated unsupported balance is good   Standing static balance is fair-  Standing dynamic balance is poor-  Tinetti 11 /28    Patient is at risk for falls due to recent surgery  BLE Strength:  Left Hip flexion 3+/5 , hip abduction 3+/5, hip adduction 3+/5, Knee flexion 4-/5  knee extension 4-/5, ankle dorsiflexion 4/5  Right Hip flexion 3+/5 , hip abduction 3+/5, hip adduction 3+/5, Knee flexion 4-/5  knee extension 4-/5, ankle dorsiflexion 4/5  BLE ROM:  Right hip/knee/ankle: WFL  Left hip/knee/ankle: WFL  Bed mobility:  mod A   Transfers:  mod/max A with sit<->stand for bed to chair, with FWW AD. mod cues and instruction needed for safety and sequencing  GAIT:  Patient ambulated  50 ft  with FWW  on level  surfaces with mod A. Pt demonstrates with decreased hip and knee flexion on BLE in pre and mid swing phase of gait as well as decreased stride-length and vic. Patient is unable to safely ambulate without assistance at this time. Pt required min cues for  safety and sequencing  Stairs: 14 with mod A and hand rail/AD  Patient education provided this visit: Safety with functional mobility and instruction with HEP.    Patient level of understanding of education provided: good reviewed back precautions,able to return demonstrate mobility instruction and HEP with min assistance  Patient response to treatment:  good with no c/o increased pain  Instructed patient with regards to

## 2023-08-05 ENCOUNTER — HOME CARE VISIT (OUTPATIENT)
Age: 73
End: 2023-08-05

## 2023-08-05 VITALS
DIASTOLIC BLOOD PRESSURE: 78 MMHG | SYSTOLIC BLOOD PRESSURE: 138 MMHG | OXYGEN SATURATION: 96 % | TEMPERATURE: 98 F | HEART RATE: 88 BPM | RESPIRATION RATE: 18 BRPM

## 2023-08-05 PROCEDURE — G0157 HHC PT ASSISTANT EA 15: HCPCS

## 2023-08-05 ASSESSMENT — ENCOUNTER SYMPTOMS
CONSTIPATION: 1
PAIN LOCATION - PAIN QUALITY: DULL ACHE
DYSPNEA ACTIVITY LEVEL: AFTER AMBULATING 10 - 20 FT

## 2023-08-06 VITALS
RESPIRATION RATE: 16 BRPM | HEART RATE: 94 BPM | TEMPERATURE: 98.9 F | OXYGEN SATURATION: 94 % | DIASTOLIC BLOOD PRESSURE: 45 MMHG | SYSTOLIC BLOOD PRESSURE: 104 MMHG

## 2023-08-07 ENCOUNTER — HOME CARE VISIT (OUTPATIENT)
Age: 73
End: 2023-08-07

## 2023-08-07 ENCOUNTER — OFFICE VISIT (OUTPATIENT)
Age: 73
End: 2023-08-07
Payer: MEDICARE

## 2023-08-07 VITALS
WEIGHT: 233 LBS | RESPIRATION RATE: 18 BRPM | TEMPERATURE: 97.4 F | BODY MASS INDEX: 34.51 KG/M2 | SYSTOLIC BLOOD PRESSURE: 97 MMHG | HEART RATE: 96 BPM | OXYGEN SATURATION: 96 % | DIASTOLIC BLOOD PRESSURE: 63 MMHG | HEIGHT: 69 IN

## 2023-08-07 VITALS
OXYGEN SATURATION: 97 % | TEMPERATURE: 98.2 F | RESPIRATION RATE: 16 BRPM | SYSTOLIC BLOOD PRESSURE: 108 MMHG | HEART RATE: 97 BPM | DIASTOLIC BLOOD PRESSURE: 70 MMHG

## 2023-08-07 DIAGNOSIS — Z98.890 S/P KYPHOPLASTY: ICD-10-CM

## 2023-08-07 DIAGNOSIS — M80.08XA AGE-RELATED OSTEOPOROSIS WITH CURRENT PATHOLOGICAL FRACTURE, VERTEBRA(E), INITIAL ENCOUNTER FOR FRACTURE (HCC): ICD-10-CM

## 2023-08-07 DIAGNOSIS — M43.9 COMPRESSION DEFORMITY OF VERTEBRA: ICD-10-CM

## 2023-08-07 DIAGNOSIS — M54.59 MECHANICAL LOW BACK PAIN: Primary | ICD-10-CM

## 2023-08-07 PROCEDURE — 3074F SYST BP LT 130 MM HG: CPT | Performed by: PHYSICAL MEDICINE & REHABILITATION

## 2023-08-07 PROCEDURE — 3017F COLORECTAL CA SCREEN DOC REV: CPT | Performed by: PHYSICAL MEDICINE & REHABILITATION

## 2023-08-07 PROCEDURE — 1123F ACP DISCUSS/DSCN MKR DOCD: CPT | Performed by: PHYSICAL MEDICINE & REHABILITATION

## 2023-08-07 PROCEDURE — 1111F DSCHRG MED/CURRENT MED MERGE: CPT | Performed by: PHYSICAL MEDICINE & REHABILITATION

## 2023-08-07 PROCEDURE — 3078F DIAST BP <80 MM HG: CPT | Performed by: PHYSICAL MEDICINE & REHABILITATION

## 2023-08-07 PROCEDURE — 96372 THER/PROPH/DIAG INJ SC/IM: CPT | Performed by: PHYSICAL MEDICINE & REHABILITATION

## 2023-08-07 PROCEDURE — 99214 OFFICE O/P EST MOD 30 MIN: CPT | Performed by: PHYSICAL MEDICINE & REHABILITATION

## 2023-08-07 PROCEDURE — G8427 DOCREV CUR MEDS BY ELIG CLIN: HCPCS | Performed by: PHYSICAL MEDICINE & REHABILITATION

## 2023-08-07 PROCEDURE — 1036F TOBACCO NON-USER: CPT | Performed by: PHYSICAL MEDICINE & REHABILITATION

## 2023-08-07 PROCEDURE — G8417 CALC BMI ABV UP PARAM F/U: HCPCS | Performed by: PHYSICAL MEDICINE & REHABILITATION

## 2023-08-07 PROCEDURE — G0151 HHCP-SERV OF PT,EA 15 MIN: HCPCS

## 2023-08-07 RX ORDER — HYDROCODONE BITARTRATE AND ACETAMINOPHEN 5; 325 MG/1; MG/1
1 TABLET ORAL 3 TIMES DAILY PRN
Qty: 21 TABLET | Refills: 0 | Status: SHIPPED | OUTPATIENT
Start: 2023-08-07 | End: 2023-08-14

## 2023-08-07 RX ORDER — KETOROLAC TROMETHAMINE 30 MG/ML
30 INJECTION, SOLUTION INTRAMUSCULAR; INTRAVENOUS ONCE
Status: COMPLETED | OUTPATIENT
Start: 2023-08-07 | End: 2023-08-07

## 2023-08-07 RX ORDER — CYCLOBENZAPRINE HCL 5 MG
5 TABLET ORAL 2 TIMES DAILY PRN
Qty: 60 TABLET | Refills: 1 | Status: SHIPPED | OUTPATIENT
Start: 2023-08-07

## 2023-08-07 RX ADMIN — KETOROLAC TROMETHAMINE 30 MG: 30 INJECTION, SOLUTION INTRAMUSCULAR; INTRAVENOUS at 17:02

## 2023-08-07 ASSESSMENT — ENCOUNTER SYMPTOMS: PAIN LOCATION - PAIN QUALITY: ACHING

## 2023-08-07 NOTE — HOME HEALTH
PT VISIT NOTE  S: Pt. states that he is feeling better  O: Medications reconciled with the patient, medications updates as needed. Wife assists with some iADL's, medication management and medical appointments as needed. Gait with FWW AD x 35 feet with min/mod A. Pt. required mod verbal cues for sequencing and coordination. Pt. demonstrates with decreased hip and knee flexion in pre and mid swing phase of gait on BLE lower extremity. Pt. demonstrates with slow cadences and decreased stride length. Transfers are min/mod A with sit to stand and bed to chair. Pt. received therapeutic exercises which included:  Squats, toe raises x 10. The need for the therex include lower extremity strengthening to facilitate safe and effective functional mobility, improvement with gait activities and to allow the patient to safely transfer within the home and allow for maximal functional independence. Reviewed HEP with the patient. This allows for improved functional mobility and independence. Stairs x 14 with min/mod A. Car transfer with mod A.  A: Pt. requires skilled PT for instruction in strengthening activities, balance and coordination activities as well as gait, transfer and safety activities. Patient is feeling very fatigued. Patient/Caregiver level of understanding of education provided:  Patient response to treatment: Patient tolerated treatment well, no complaint of new pain noted post treatment. Pt. has episodes of sharp shooting pain with movement. Pt. is going to see his ronal NARANJO today. Instructed the patient with safety during mobility as well as reviewing the HEP program to facilitate increased independence with all aspects of functional mobility. Instruction with gait sequencing to facilitate increase in gait quality by increasing the hip and knee flexion in pre and mid swing phase of gait.  Pt. was able to return demonstrate the gait training by demonstrating an increase in hip and knee flexion in the pre and mid

## 2023-08-08 ENCOUNTER — TELEPHONE (OUTPATIENT)
Age: 73
End: 2023-08-08

## 2023-08-08 DIAGNOSIS — M43.9 COMPRESSION DEFORMITY OF VERTEBRA: Primary | ICD-10-CM

## 2023-08-08 NOTE — TELEPHONE ENCOUNTER
Patient's wife called and stated that he's currently in more pain than before and wants to move forward with the xray or mri, whichever Dr. John Raines suggests. They are requesting a call back to be informed if the order has been put in. Patient can be reached at [phone].

## 2023-08-09 ENCOUNTER — HOME CARE VISIT (OUTPATIENT)
Age: 73
End: 2023-08-09

## 2023-08-09 VITALS
HEART RATE: 95 BPM | TEMPERATURE: 98 F | DIASTOLIC BLOOD PRESSURE: 78 MMHG | RESPIRATION RATE: 20 BRPM | SYSTOLIC BLOOD PRESSURE: 118 MMHG | OXYGEN SATURATION: 95 %

## 2023-08-09 PROCEDURE — G0151 HHCP-SERV OF PT,EA 15 MIN: HCPCS

## 2023-08-09 PROCEDURE — G0300 HHS/HOSPICE OF LPN EA 15 MIN: HCPCS

## 2023-08-09 ASSESSMENT — ENCOUNTER SYMPTOMS: PAIN LOCATION - PAIN QUALITY: ACHING

## 2023-08-09 NOTE — HOME HEALTH
mobility and gait as well as on safety education during all mobility including balance as well as strengthening the hip and knee musculature to allow for an increased level of functional independence with mobility.

## 2023-08-09 NOTE — TELEPHONE ENCOUNTER
LV given Toradol and Stovall. He was getting better. Ordered DEXA. Since he is getting worse, I will order new lumbar MRI.

## 2023-08-09 NOTE — TELEPHONE ENCOUNTER
I called and spoke to Mrs. Claudean Goon. The pt was identified using 2 pt identifiers. I informed her that a new MRI has been ordered to see if there is anything new going on with the pt. I provided the number to the scheduling dept so that they can call and get the test scheduled if they do not want to wait until they are contacted by the scheduling dept. I asked if the pt was taking the norco that was prescribed at the 08/07/23 visit. She states that he just got this today and has started taking it. He also has the muscle relaxer. I asked her to contact the office once he has the MRI done so that we can watch for the results. She verbalized understanding and has no questions or concerns at this time.

## 2023-08-11 ENCOUNTER — HOME CARE VISIT (OUTPATIENT)
Age: 73
End: 2023-08-11
Payer: MEDICARE

## 2023-08-11 ENCOUNTER — HOME CARE VISIT (OUTPATIENT)
Age: 73
End: 2023-08-11

## 2023-08-11 VITALS
RESPIRATION RATE: 17 BRPM | TEMPERATURE: 97.9 F | HEART RATE: 91 BPM | DIASTOLIC BLOOD PRESSURE: 59 MMHG | SYSTOLIC BLOOD PRESSURE: 106 MMHG | OXYGEN SATURATION: 97 %

## 2023-08-11 VITALS
TEMPERATURE: 97.9 F | OXYGEN SATURATION: 97 % | RESPIRATION RATE: 17 BRPM | SYSTOLIC BLOOD PRESSURE: 106 MMHG | HEART RATE: 91 BPM | DIASTOLIC BLOOD PRESSURE: 59 MMHG

## 2023-08-11 PROCEDURE — G0151 HHCP-SERV OF PT,EA 15 MIN: HCPCS

## 2023-08-11 PROCEDURE — G0300 HHS/HOSPICE OF LPN EA 15 MIN: HCPCS

## 2023-08-11 ASSESSMENT — ENCOUNTER SYMPTOMS: PAIN LOCATION - PAIN QUALITY: ACHING

## 2023-08-11 NOTE — HOME HEALTH
PT VISIT NOTE  S: Pt. states that his back pain is still severe, he has a referral to Dr. Gem Montero: Medications reconciled with the patient, medications updates as needed. Wife assists with some iADL's, medication management and medical appointments as needed. Gait with FWW AD x 50 feet with CGA/min A. Pt. required min verbal cues for sequencing and coordination. Pt. demonstrates with decreased hip and knee flexion in pre and mid swing phase of gait on LLE lower extremity. Pt. demonstrates with slow cadences and decreased stride length. Transfers are min/mod A  with sit to stand and bed to chair. This allows for improved functional mobility and independence. Pt. received therapeutic exercises which included:  Squats, marching in place, toe raises and hip ext. x 10. The need for the therex include lower extremity strengthening to facilitate safe and effective functional mobility, improvement with gait activities and to allow the patient to safely transfer within the home and allow for maximal functional independence. Reviewed HEP with the patient. A: Pt. requires skilled PT for instruction in strengthening activities, balance and coordination activities as well as gait, transfer and safety activities. Patient/Caregiver level of understanding of education provided: Pt. was able to return demonstrate all mobility training and HEP shown with SBA cues. Patient response to treatment: Patient tolerated treatment good, no complaint of new pain noted post treatment. Instructed the patient with safety during mobility as well as reviewing the HEP program to facilitate increased independence with all aspects of functional mobility. Instruction with gait sequencing to facilitate increase in gait quality by increasing the hip and knee flexion in pre and mid swing phase of gait. Pt. was able to return demonstrate the gait training by demonstrating an increase in hip and knee flexion in the pre and mid swing phase of gait.  Pt.

## 2023-08-13 VITALS
HEART RATE: 95 BPM | TEMPERATURE: 98 F | DIASTOLIC BLOOD PRESSURE: 68 MMHG | SYSTOLIC BLOOD PRESSURE: 118 MMHG | OXYGEN SATURATION: 95 % | RESPIRATION RATE: 20 BRPM

## 2023-08-14 ENCOUNTER — HOME CARE VISIT (OUTPATIENT)
Age: 73
End: 2023-08-14
Payer: MEDICARE

## 2023-08-14 VITALS
TEMPERATURE: 97.3 F | RESPIRATION RATE: 17 BRPM | HEART RATE: 100 BPM | OXYGEN SATURATION: 93 % | DIASTOLIC BLOOD PRESSURE: 78 MMHG | SYSTOLIC BLOOD PRESSURE: 122 MMHG

## 2023-08-14 PROCEDURE — G0151 HHCP-SERV OF PT,EA 15 MIN: HCPCS

## 2023-08-14 ASSESSMENT — ENCOUNTER SYMPTOMS: PAIN LOCATION - PAIN QUALITY: ACHING

## 2023-08-14 NOTE — HOME HEALTH
to return demonstrate the HEP as instructed. P: Next session there will be continued focus on transfer, mobility and gait as well as on safety education during all mobility including balance as well as strengthening the hip and knee musculature to allow for an increased level of functional independence with mobility.

## 2023-08-15 ENCOUNTER — HOSPITAL ENCOUNTER (OUTPATIENT)
Facility: HOSPITAL | Age: 73
Discharge: HOME | End: 2023-08-18
Attending: PHYSICAL MEDICINE & REHABILITATION
Payer: MEDICARE

## 2023-08-15 DIAGNOSIS — M43.9 COMPRESSION DEFORMITY OF VERTEBRA: ICD-10-CM

## 2023-08-15 DIAGNOSIS — M80.08XA AGE-RELATED OSTEOPOROSIS WITH CURRENT PATHOLOGICAL FRACTURE, VERTEBRA(E), INITIAL ENCOUNTER FOR FRACTURE (HCC): ICD-10-CM

## 2023-08-15 PROCEDURE — 77080 DXA BONE DENSITY AXIAL: CPT

## 2023-08-15 NOTE — HOME HEALTH
Skilled reason for visit: Pt assessment and wound care    Caregiver involvement: Pt resides in two story home with his wife who is his CG, pt amb with use of a walker and requires asst with ADL's, medications, and transportation which wife provides and is available 24/7 to asst as needed    This visit: Pt is A&Ox4, able to communicate his needs, states pain is a 2/10 at this time, SN provided wound care as ordered, pt tolerated well, area is very small with no redness or swelling at site. Home health supplies by type and quantity ordered/delivered this visit include: N/A    Medications reviewed and all medications are available in the home this visit. MD notified of any discrepancies/look a-like medications/medication interactions: NA    Medications are effective at this time. Patient education provided this visit:SEE INTERVENTIONS LIST    Pt advised not to start, stop or change the way he is currently taking any of his medications without first consulting with the MD.    SN instructed pt to promptly report any redness, swelling, warmth, fever, chills, increased heart/respiratory rate, uncontrolled pain/tenderness, drainage: green/yellow/brown, or odor to MD immediately. No s/s of infection noted this visit no odor or foul drainage noted. SN instructed pt to notify home health or physician for the following wound healing complications: increased drainage, pus or a foul odor coming from the wound, fever, muscle, joint, or body aches, sweating, increased swelling, redness or red streaks surrounding the wound; dramatic change in color or size of wound; increase in pain at wound site in spite of interventions. -SN advised pt during shower/bath time to assess skin for evidence of pressure injury such as: non-blanchable erythema, blistering, discoloration, alteration of oral mucosa: signs of mucositis, yeast infection, or breaks in membrane, edema, rash and skin tears.   Report location, size, and

## 2023-08-16 ENCOUNTER — HOME CARE VISIT (OUTPATIENT)
Age: 73
End: 2023-08-16
Payer: MEDICARE

## 2023-08-16 ENCOUNTER — HOSPITAL ENCOUNTER (OUTPATIENT)
Facility: HOSPITAL | Age: 73
Discharge: HOME | End: 2023-08-19
Attending: PHYSICAL MEDICINE & REHABILITATION
Payer: MEDICARE

## 2023-08-16 VITALS
SYSTOLIC BLOOD PRESSURE: 138 MMHG | OXYGEN SATURATION: 96 % | RESPIRATION RATE: 17 BRPM | DIASTOLIC BLOOD PRESSURE: 80 MMHG | HEART RATE: 100 BPM | TEMPERATURE: 97.8 F

## 2023-08-16 DIAGNOSIS — M43.9 COMPRESSION DEFORMITY OF VERTEBRA: ICD-10-CM

## 2023-08-16 PROCEDURE — G0151 HHCP-SERV OF PT,EA 15 MIN: HCPCS

## 2023-08-16 PROCEDURE — 72148 MRI LUMBAR SPINE W/O DYE: CPT

## 2023-08-16 NOTE — HOME HEALTH
PT VISIT NOTE  S: Pt. states that he had a bone density test yesterday and has more pain since laying on the table for the bone density exam  O: Medications reconciled with the patient, medications updates as needed. Wife assists with some iADL's, medication management and medical appointments as needed. Stairs x 14 with handrail with min A. Gait with FWW AD x 75 feet with FWW. Pt. required min verbal cues for sequencing and coordination. Pt. demonstrates with decreased hip and knee flexion in pre and mid swing phase of gait on BLE lower extremity. Pt. demonstrates with slow cadences and decreased stride length. Transfers are CGA  with sit to stand and bed to chair. This allows for improved functional mobility and independence. Pt. received therapeutic exercises which included:  Squats, marching in place, Hip abd/add, toe raises x 10. The need for the therex include lower extremity strengthening to facilitate safe and effective functional mobility, improvement with gait activities and to allow the patient to safely transfer within the home and allow for maximal functional independence. Reviewed HEP with the patient. A: Pt. requires skilled PT for instruction in strengthening activities, balance and coordination activities as well as gait, transfer and safety activities. Patient/Caregiver level of understanding of education provided: Pt. was able to return demonstrate all mobility training and HEP shown with SBA. Patient response to treatment: Patient tolerated treatment well, no complaint of new pain noted post treatment. Instructed the patient with safety during mobility as well as reviewing the HEP program to facilitate increased independence with all aspects of functional mobility. Instruction with gait sequencing to facilitate increase in gait quality by increasing the hip and knee flexion in pre and mid swing phase of gait.  Pt. was able to return demonstrate the gait training by demonstrating an increase in

## 2023-08-18 ENCOUNTER — TELEPHONE (OUTPATIENT)
Age: 73
End: 2023-08-18

## 2023-08-18 ENCOUNTER — HOME CARE VISIT (OUTPATIENT)
Age: 73
End: 2023-08-18
Payer: MEDICARE

## 2023-08-18 VITALS
HEART RATE: 111 BPM | DIASTOLIC BLOOD PRESSURE: 78 MMHG | OXYGEN SATURATION: 95 % | TEMPERATURE: 97.9 F | RESPIRATION RATE: 16 BRPM | SYSTOLIC BLOOD PRESSURE: 128 MMHG

## 2023-08-18 DIAGNOSIS — S22.000A COMPRESSION FRACTURE OF BODY OF THORACIC VERTEBRA (HCC): Primary | ICD-10-CM

## 2023-08-18 DIAGNOSIS — S32.030A COMPRESSION FRACTURE OF L3 VERTEBRA, INITIAL ENCOUNTER (HCC): ICD-10-CM

## 2023-08-18 PROCEDURE — G0151 HHCP-SERV OF PT,EA 15 MIN: HCPCS

## 2023-08-18 RX ORDER — HYDROCODONE BITARTRATE AND ACETAMINOPHEN 5; 325 MG/1; MG/1
1 TABLET ORAL EVERY 6 HOURS PRN
Qty: 28 TABLET | Refills: 0 | Status: SHIPPED | OUTPATIENT
Start: 2023-08-18 | End: 2023-08-25

## 2023-08-18 ASSESSMENT — ENCOUNTER SYMPTOMS: PAIN LOCATION - PAIN QUALITY: ACHING

## 2023-08-18 NOTE — RESULT ENCOUNTER NOTE
New comp fx T12 and L3. Spoke to patient regarding results and plan. [facility]. IR referral. Previously referred to Dr. Mira Fortune for osteoporosis.  Will call that office Monday morning and get him evaluated ASAP

## 2023-08-18 NOTE — HOME HEALTH
PT VISIT NOTE  S: Pt. states that he had an MRI yesterday and is seeing Dr. Judith Oviedo today  O: Medications reconciled with the patient, medications updates as needed. Wife assists with some iADL's, medication management and medical appointments as needed. Gait with FWW AD x 15 feet with FWW. Pt. required Min verbal cues for sequencing and coordination. Pt. demonstrates with decreased hip and knee flexion in pre and mid swing phase of gait on BLE lower extremity. Pt. demonstrates with slow cadences and decreased stride length. Stairs x 12 with CGA A. Transfers are SBA/CGA  with sit to stand and bed to chair. This allows for improved functional mobility and independence. Pt. is demonstrating with high RHR and some SOB after walking 10-15 feet. Called his Cardiologist Dr. Summer Murrell and updated his office regarding his tachcardia. Office advised him to call back after seeing Dr. Judith Oviedo today and update the office regarding his HR. Therex held today secondary to elevated HR. A: Pt. requires skilled PT for instruction in strengthening activities, balance and coordination activities as well as gait, transfer and safety activities. Patient/Caregiver level of understanding of education provided: Pt. was able to return demonstrate all mobility training and HEP shown with supervision. Patient response to treatment: Patient tolerated treatment well, no complaint of new pain noted post treatment. Instructed the patient with safety during mobility as well as reviewing the HEP program to facilitate increased independence with all aspects of functional mobility. Instruction with gait sequencing to facilitate increase in gait quality by increasing the hip and knee flexion in pre and mid swing phase of gait. Pt. was able to return demonstrate the gait training by demonstrating an increase in hip and knee flexion in the pre and mid swing phase of gait. Pt. was also able to return demonstrate the HEP as instructed.   P: Next session

## 2023-08-18 NOTE — TELEPHONE ENCOUNTER
Miguel Medley with phyisical therapy called due patient having a high heart rate between 110-116 at rest and when up moving around he is sob. Patient state he took morning metoprolol at 830 this morning. Patient has an appointment with Dr Lala All today at 6 and will call me back with blood pressure and heart rate after morning medication in body little longer.

## 2023-08-21 ENCOUNTER — HOME CARE VISIT (OUTPATIENT)
Age: 73
End: 2023-08-21
Payer: MEDICARE

## 2023-08-21 ENCOUNTER — TELEPHONE (OUTPATIENT)
Age: 73
End: 2023-08-21

## 2023-08-21 VITALS — HEART RATE: 122 BPM | TEMPERATURE: 99 F | RESPIRATION RATE: 17 BRPM | OXYGEN SATURATION: 96 %

## 2023-08-21 PROCEDURE — G0151 HHCP-SERV OF PT,EA 15 MIN: HCPCS

## 2023-08-21 NOTE — HOME HEALTH
PT VISIT NOTE  S: Pt. states that his back pain is now moving to his left hip and is increasing in intensity. Saw Dr. Lala All on Friday for a consult and patient is not a surgical candiate at this time. O: Medications reconciled with the patient, medications updates as needed. Wife assists with some iADL's, medication management and medical appointments as needed. Patient  s HR is 122 at rest and patient called Dr. Wilner Hanna office ant notified them of  the elevated HR in an attempt to get an appointment. Pt. decclined therapy today secondary to elevated HR and pain in low back. Reviewed HEP with the patient. A: Pt. requires skilled PT for instruction in strengthening activities, balance and coordination activities as well as gait, transfer and safety activities. Patient/Caregiver level of understanding of education provided: Patient response to treatment: Patient tolerated treatment well, no complaint of new pain noted post treatment. Instructed the patient with safety during mobility as well as reviewing the HEP program to facilitate increased independence with all aspects of functional mobility. Instruction with gait sequencing to facilitate increase in gait quality by increasing the hip and knee flexion in pre and mid swing phase of gait. P: Next session there will be continued focus on transfer, mobility and gait as well as on safety education during all mobility including balance as well as strengthening the hip and knee musculature to allow for an increased level of functional independence with mobility.

## 2023-08-21 NOTE — TELEPHONE ENCOUNTER
Patient called and states he is having an irregular heartbeat and that his heart rate is 122. Patient would like to now what he should do his callback number is [phone]. Please advise.

## 2023-08-22 ENCOUNTER — HOME CARE VISIT (OUTPATIENT)
Age: 73
End: 2023-08-22
Payer: MEDICARE

## 2023-08-22 ENCOUNTER — TELEPHONE (OUTPATIENT)
Age: 73
End: 2023-08-22

## 2023-08-22 VITALS
RESPIRATION RATE: 16 BRPM | TEMPERATURE: 98.9 F | OXYGEN SATURATION: 97 % | SYSTOLIC BLOOD PRESSURE: 120 MMHG | DIASTOLIC BLOOD PRESSURE: 78 MMHG | HEART RATE: 88 BPM

## 2023-08-22 PROCEDURE — G0152 HHCP-SERV OF OT,EA 15 MIN: HCPCS

## 2023-08-22 ASSESSMENT — ENCOUNTER SYMPTOMS: PAIN LOCATION - PAIN QUALITY: ACH

## 2023-08-22 NOTE — HOME HEALTH
Recent H/o current illness: 67year old male presents with MD referral for [facility] [place] s/p hospitalization due to L2L4 kyphoplasty    Past Medical Hx: Sever OA intractable back pain multilple compression fx HTN P AFIB     Caregiver Involvement: assist with ADL's, IADLs and medical appointments    PLOF: Pt lives in 2 story home with wife. Pt PLOF is ambulation with no assistive device. Pts base level of function independent with all ADL's    Medications: Pt reports no changes since last reported and educated to continue as directed per MD.     SUBJECTIVE: Pt reports new compression fx in his T12 and L3 and with plans to schedule an additional kyphoplasty. Pt reports 2/10 pain in his back. Pt wife present throughout. DME ORDERED/RECOMMENEDED: N/A    OBJECTIVE:  BATHING:Pt has tub shower with tub transfer bench. Pt has long handled sponge. Pt moderate assistance for upper/lower body bathing with wife assistance  TOILETING: Pt CGA/minimal assistance for toileting. UB DRESSING: Pt minimal assisrance for upper body dressing to matthwe/doff shirts  LB DRESSING: Pt maximal assistance for lowre body dressing to matthew/doff socks, shoes and pants. Pt educated on lower body dressing with spinal precautions to include figure 4 dressing  technique vs reacher, sock aid and long handled shoe horn. Pt report he has reacher and long handled sponge  GROOMING: Pt  for grooming for hair care, oral care and washing hands/face. Pt educated on spinal precautions with grooming tasks. FEEDING: Pt independent for self feeding    BUE MMT: 4/5  BUE ROM:WFL    VISION:Pt vision is [facility] for reading directions/medications and to navigate their home environment safely. BALANCE:Pt with good sitting balance/tolerance.  Pt with fair - standing balance/tolerance using rolling walker     BUE COORDINATION: Pt B UE coordination WFL shown through serial opposition test. Pt reports they are able to open containers and manipulate clothing fasteners without

## 2023-08-22 NOTE — TELEPHONE ENCOUNTER
----- Message from LUC Denson NP sent at 8/20/2023  8:53 PM EDT -----    ----- Message -----  From: Geneva Nazario MD  Sent: 8/18/2023   4:51 PM EDT  To: [facility] Clinical Staff     Patient previously referred to Dr. Andree Bobo, has not heard anything about appt. This patient has had 5 spinal compression fractures w/ 3 kyphoplasties in the last month.     Plz call Dr. Jose Guadalupe Corcoran office Monday morning with the above information and schedule appt for patient asap for osteoporosis work-up.    ----- Message -----  From: Lewis Baldwin Incoming Orders Results To Radiant  Sent: 8/18/2023   4:09 PM EDT  To: Geneva Nazario MD

## 2023-08-22 NOTE — TELEPHONE ENCOUNTER
I called and spoke with a  at Dr. General Owusu office. She confirmed that they do have the referral that was sent 08/07/23 but had not yet tried to call the pt. I explained that he needs to be seen as soon as possible because of the repeated compression fractures. She verbalized understanding and will give his name to the new pt referral coordinator to reach out to him. He would be a new pt to them because he has never seen Dr. Mira Fortune. I called and spoke to Mrs. Miroslava Andrade. She was notified that I have contacted their office regarding the referral. She is aware that they should be reaching out to them to schedule an appt. I asked her to call our office back on Thursday if they have not heard from anyone. She verbalized understanding and has no questions or concerns at this time.

## 2023-08-23 ENCOUNTER — HOME CARE VISIT (OUTPATIENT)
Age: 73
End: 2023-08-23
Payer: MEDICARE

## 2023-08-23 VITALS
RESPIRATION RATE: 16 BRPM | OXYGEN SATURATION: 97 % | HEART RATE: 114 BPM | SYSTOLIC BLOOD PRESSURE: 112 MMHG | DIASTOLIC BLOOD PRESSURE: 78 MMHG | TEMPERATURE: 98.2 F

## 2023-08-23 PROCEDURE — G0151 HHCP-SERV OF PT,EA 15 MIN: HCPCS

## 2023-08-23 PROCEDURE — G0300 HHS/HOSPICE OF LPN EA 15 MIN: HCPCS

## 2023-08-23 ASSESSMENT — ENCOUNTER SYMPTOMS: PAIN LOCATION - PAIN QUALITY: ACHING

## 2023-08-23 NOTE — HOME HEALTH
PT VISIT NOTE  S: Pt. states that his back pain has decreased and that he has a cardiology appointment tomorrow to address his elevated HR at rest.  O: Medications reconciled with the patient, medications updates as needed. Wife assists with some iADL's, medication management and medical appointments as needed. Gait with FWW AD x 75 feet with SBA/CGA. Pt. required SBA verbal cues for sequencing and coordination. Pt. demonstrates with decreased hip and knee flexion in pre and mid swing phase of gait on BLE lower extremity. Pt. demonstrates with slow cadences and decreased stride length. Transfers are SBA  with sit to stand and bed to chair. This allows for improved functional mobility and independence. Pt. received therapeutic exercises which included:  Squats, marching in place, Hip abd/add, toe raises and hip ext. x 10. Dynamic standing exercises on blue foam including pouring water from one cup to another while counting backwards in 3's, putting mcdonald beans from one container into another. The need for the therex include lower extremity strengthening to facilitate safe and effective functional mobility, improvement with gait activities and to allow the patient to safely transfer within the home and allow for maximal functional independence. Reviewed HEP with the patient. A: Pt. becomes SOB with minimal exertion and needs frequent rest periods. Pt. has an appointment with Dr. Leann Lorenzo, his cardiologist tomorrow. Pt. requires skilled PT for instruction in strengthening activities, balance and coordination activities as well as gait, transfer and safety activities. Patient/Caregiver level of understanding of education provided: Pt. was able to return demonstrate all mobility training and HEP shown with supervision. Patient response to treatment: Patient tolerated treatment well, no complaint of new pain noted post treatment.  Instructed the patient with safety during mobility as well as reviewing the HEP program to

## 2023-08-24 ENCOUNTER — OFFICE VISIT (OUTPATIENT)
Age: 73
End: 2023-08-24

## 2023-08-24 DIAGNOSIS — Z01.818 PRE-OP TESTING: ICD-10-CM

## 2023-08-24 DIAGNOSIS — I48.0 PAROXYSMAL ATRIAL FIBRILLATION (HCC): Primary | ICD-10-CM

## 2023-08-24 NOTE — PATIENT INSTRUCTIONS
If you have not heard from the central scheduler to schedule your testing in 48 hours, please call [phone].

## 2023-08-24 NOTE — PROGRESS NOTES
Dayana Johnson presents today for No chief complaint on file. Dayana Johnson preferred language for health care discussion is english/other. Is someone accompanying this pt? no    Is the patient using any DME equipment during OV? no    Depression Screening:  Depression: Not at risk    PHQ-2 Score: 0        Learning Assessment:  No question data found. Pt currently taking Anticoagulant therapy? no    Pt currently taking Antiplatelet therapy ? no      Coordination of Care:  1. Have you been to the ER, urgent care clinic since your last visit? Hospitalized since your last visit? no    2. Have you seen or consulted any other health care providers outside of the [address] [place] [address] since your last visit? Include any pap smears or colon screening.  no

## 2023-08-25 ENCOUNTER — HOME CARE VISIT (OUTPATIENT)
Age: 73
End: 2023-08-25
Payer: MEDICARE

## 2023-08-28 ENCOUNTER — HOME CARE VISIT (OUTPATIENT)
Age: 73
End: 2023-08-28
Payer: MEDICARE

## 2023-08-28 ENCOUNTER — HOSPITAL ENCOUNTER (OUTPATIENT)
Facility: HOSPITAL | Age: 73
Discharge: HOME | End: 2023-08-31
Payer: MEDICARE

## 2023-08-28 VITALS
OXYGEN SATURATION: 96 % | DIASTOLIC BLOOD PRESSURE: 70 MMHG | RESPIRATION RATE: 17 BRPM | TEMPERATURE: 98.1 F | HEART RATE: 107 BPM | SYSTOLIC BLOOD PRESSURE: 110 MMHG

## 2023-08-28 DIAGNOSIS — S32.000A CLOSED COMPRESSION FRACTURE OF LUMBOSACRAL SPINE, INITIAL ENCOUNTER (HCC): ICD-10-CM

## 2023-08-28 PROCEDURE — G0158 HHC OT ASSISTANT EA 15: HCPCS

## 2023-08-28 PROCEDURE — 72100 X-RAY EXAM L-S SPINE 2/3 VWS: CPT

## 2023-08-28 PROCEDURE — G0151 HHCP-SERV OF PT,EA 15 MIN: HCPCS

## 2023-08-28 ASSESSMENT — ENCOUNTER SYMPTOMS
STOOL DESCRIPTION: FORMED
PAIN LOCATION - PAIN QUALITY: ACHING
PAIN LOCATION - PAIN QUALITY: ACHE SHARP

## 2023-08-28 NOTE — HOME HEALTH
DC ACTIONS NARRATIVE   Pt. clinically discharged and documentation finalized for completion of PT discharge. Caregiver involvement: Pt wife is primary caregiver at this time and has been assisting with medical appointments and ADL support. Medications reconciled and all medications are available in the home this visit. The following education was provided regarding medications, medication interactions, and look a like medications: NA Medications are effective at this time. Home health supplies by type and quantity ordered/delivered this visit include: NA  Patient education provided this visit: safety with functional mobility  Current Functional Status and progress towards goals:   Strength: Pt. BLE strength is 4/5 which is an improvement from the initial evaluation strength of 3+/5. This allows the patient increased functional independence and mobility  BED MOBILITY: Pt. is independent with all bed mobility which demonstrates an improvement from the initial evaluation of mod A. This allows for the patient to be functionally more independent. TRANSFERS: Pt. is mod I with all transfers with FWW AD which demonstrates an improvement from the initial evaluation score of mod/max A with FWW AD. This allows the patient increased functional independence and mobility   GAIT/WC MOBILITY: Pt. is able to ambulate >250 feet inside over even and outside over uneven surfaces with mod I with FWW AD. This represents an improvement from the initial evaluation of 50 feet with FWW AD on level surfaces with mod A. STAIRS: 12+ stairs with mod I with handrail   BALANCE: Patient's final Tinetti is 22/28 which is an improvement from the initial evaluation score of 11/28. This allows the patient to be functionally more independent and have a decreased fall risk   Progress toward goals: Patient has met  goals, see interventions for details. Pt. was able to return demonstrate all mobility training and HEP shown independently.  Patient

## 2023-08-28 NOTE — HOME HEALTH
SKILLED REASON for visit:sp L2/L4 postcare [facility] CAREGIVER INVOLVEMENT: is available as needed for assistance with iadls, adls, meal prep, medication management, taking to md appointments. MEDICATIONS: reviewed and all medications are available in the home this visit. Patient denies any medication changes at this time of assessment. EDUCATION PROVIDED: regarding medications, medication interactions, and look alike medications (specify): reviewed side effects, purposes, dosage, frequencies. High risk medication teaching regarding anticoagulants, hyperglycemic agents or opiod narcotics performed (specify) na Medications are effective at this time. SUPPLIES: by type and quantity ordered/delivered this visit include: n/a PATIENT EDUCATION ON THIS VISIT:SN instructed patient to take pain medication before pain becomes severe to achieve better pain control. Always refill your meds before you run out of them. SN instructed patient on nonpharmacologic pain relief measures, including relaxation techniques, positioning ,etc. SN instructed to report to physician if experiencing pain level not acceptable , pain level greater than 6/10, pain medications not effective, unable to tolerate pain medications, and pain affecting ability to perform normal activities PATIENT LEVEL OF UNDERSTANDING: patient/cg has a partial understanding of education that was provided at this time by engaging in all education provided and is able to verbalize understanding, pt denies any questions or concerns at this time. SKILLED CARE PERFORMED THIS VISIT- na PATIENT RESPONSE TO PROCEDURE PERFORMED: patient tolerated procedure with no signs of discomfort, grimacing or pain, no complications or concerns noted. Agency Progress toward goals: goals/teaching reviewed. patient is progressing towards goals at this time, Patient's Progress towards personal goals: pt reporting they are progressing towards their goals at this time.  Home exercise program: HEP deep

## 2023-08-29 VITALS
HEART RATE: 86 BPM | DIASTOLIC BLOOD PRESSURE: 86 MMHG | RESPIRATION RATE: 17 BRPM | SYSTOLIC BLOOD PRESSURE: 132 MMHG | OXYGEN SATURATION: 98 % | TEMPERATURE: 98 F

## 2023-08-29 ASSESSMENT — ENCOUNTER SYMPTOMS: PAIN LOCATION - PAIN QUALITY: ACHING

## 2023-08-29 NOTE — HOME HEALTH
for proper hand placement on chair  when performing transfers from the couch to ensure safety. MATA provided education on LRAD to help assit with dressing and bathing tasks such as ((sock aid, reacher, long handled shoe horn, dressing stick, long handled sponge, etc.), in return client able to teach-back equipment with SUP with use of energy conservation techniques. The pt able to verbalize and demo  energy conservation techniques when performing I/ADL tasks such as sitting down when performing bathing and dressing tasks. sitting down when performing meals, pacing oneself when performing a functional activity to prevent exertion, allowing adequate periods of rest after a tasks, sitting down when performing LB dressing, Sitting down when performing energy conservation techniques. Pt reports her RPE on this date was 5/10 on the modified Enmanuel scale . Pt reported his pain in his lower back an 2/10 on the pain numeric chronic and is being addressed by pain med's and follow up visits with MD. Even though pain level today is a 2/10, it did not affect ability to perform therapy tasks. (Please see pain tab for more details.)   . CONTINUED NEED FOR THE FOLLOWING SKILLS: HH OT is medically necessary to address pain, decreased functional strength, decreased independence and safety with functional transfers, decreased independence and safety performing ADL/IADL tasks, decreased activity and standing tolerance, decreased functional endurance, and impaired balance in order to improve functional independence, obtain set goals, reduce risk of falls, reduce pain, improve quality of life, and return to PLOF. Vielka Abrams PLAN FOR NEXT VISIT: Marshall Freed will address functional transfers and ADLS   .   THE FOLLOWING DISCHARGE PLANNING WAS DISCUSSED WITH THE PATIENT/CAREGIVER: Tentative d/c 2w2

## 2023-09-01 ENCOUNTER — HOME CARE VISIT (OUTPATIENT)
Age: 73
End: 2023-09-01
Payer: MEDICARE

## 2023-09-01 VITALS
OXYGEN SATURATION: 98 % | TEMPERATURE: 98 F | HEART RATE: 86 BPM | DIASTOLIC BLOOD PRESSURE: 86 MMHG | SYSTOLIC BLOOD PRESSURE: 140 MMHG | RESPIRATION RATE: 17 BRPM

## 2023-09-01 PROCEDURE — G0158 HHC OT ASSISTANT EA 15: HCPCS

## 2023-09-01 ASSESSMENT — ENCOUNTER SYMPTOMS: PAIN LOCATION - PAIN QUALITY: ACHING

## 2023-09-01 NOTE — HOME HEALTH
SUBJECTIVE: Patient reports his pain in his lower back an 2/10 on the pain numeric scale. Pt stated he is he \"Ready for early\" D/c, Mr. Bhavik Ware stated he is doing alot of things on his own now. Laura Flowers CAREGIVER INVOLVEMENT/ASSISTANCE NEEDED FOR: The pt family helps with all I/ADLS due to his inability to do so. Laura Flowers HOME HEALTH SUPPLIES BY TYPE AND QUANTITY ORDERED/DELIVERED THIS VISIT INCLUDE: NONE     . OBJECTIVE:  See interventions. .    Patient education provided this visit: Tatianna Etienne provided continued educatuin on , precautions,  Energy conservation techniques,and Purse-lip breathing techniques. .         Patient level of understanding of education provided: Mr. Bhavik Ware was able to teach-back education provided from [id number]ing requiring two verbal cuing form MATA for reenforcement of safety to prevent confusion. .    RESPONSE TO TREATMENT: Pt reported an 3/10 on numeric scale after performing ADLS, Bed-mobility and functional transfers and  with use of energy conservation techniques. Pt required two periods of rest while performing  functional mobility tasks due to exertion when performing functional activity. (Please see interventions for more details of goal previously mentioned)     . ASSESSMENT OF PROGRESS TOWARD GOALS((Please see interventions for more details)): Pt has met goals on OT POC addressing functional mobitly and ADL retraining tasks. MATA assesed client for BADL mock stimulated dressing tasks to promote functionality and independence.  In return,client able to demo Gromming tasks ( Oral care,Brushing hair and washing face with MOD I once s/u was completed, stimulated UB bathing with wash cloth with MOD I while seated, LB bathing with wash cloth with MOD I while seated, UB dressing with MOD I  while seated,and LB dressing with use an gait belt MOD while seated with no rest breaks and no verbal cuing provided from MATA for use of energy conservation techniques and

## 2023-09-03 NOTE — ED PROVIDER NOTES
on, 12 hours off. METHOCARBAMOL (ROBAXIN-750) 750 MG TABLET    Take 1 tablet by mouth 4 times daily as needed (back pain)    TAMSULOSIN (FLOMAX) 0.4 MG CAPSULE    Take 1 capsule by mouth daily     Controlled Substances Monitoring:     No flowsheet data found.     (Please note that portions of this note were completed with a voice recognition program.  Efforts were made to edit the dictations but occasionally words are mis-transcribed.)    Nettie Dial MD (electronically signed)  Attending Emergency Physician           Nettie Dial MD  07/18/23 0293 Tdap; 11-Aug-2019 15:25; Avery Marlow (RN); [employer]; [id number] (Exp. Date: 15-May-2021); IntraMuscular; Deltoid Left.; 0.5 milliLiter(s); VIS (VIS Published: 09-May-2013, VIS Presented: 11-Aug-2019);

## 2023-09-07 ENCOUNTER — HOME CARE VISIT (OUTPATIENT)
Age: 73
End: 2023-09-07
Payer: MEDICARE

## 2023-09-07 VITALS
SYSTOLIC BLOOD PRESSURE: 118 MMHG | DIASTOLIC BLOOD PRESSURE: 70 MMHG | OXYGEN SATURATION: 96 % | RESPIRATION RATE: 16 BRPM | TEMPERATURE: 97.8 F | HEART RATE: 99 BPM

## 2023-09-07 PROCEDURE — G0152 HHCP-SERV OF OT,EA 15 MIN: HCPCS

## 2023-09-07 ASSESSMENT — ENCOUNTER SYMPTOMS: PAIN LOCATION - PAIN QUALITY: ACH

## 2023-09-07 NOTE — HOME HEALTH
SUBJECTIVE: Pt reports increased pain in back since oncology appointment yesterday. 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 Pt requesting to discharge early due to multiple appointments and new diagnosis. CAREGIVER INVOLVEMENT/ASSISTANCE NEEDED FOR: Pt wife assists with ADLs, IADLs and functional mobility    HOME HEALTH SUPPLIES BY TYPE AND QUANTITY ORDERED/DELIVERED THIS VISIT INCLUDE: N/A    OBJECTIVE: See interventions    PATIENT RESPONSE TO TREATMENT: Pt responded well to skilled home health occupational therapy services    PATIENT LEVEL OF UNDERSTANDING OF EDUCATION PROVIDED: Pt educated on continued use of adaptive equipment/devices to include reacher  and tub transfer bench. OCCUPATIONAL THERAPY DISCHARGE:Mr. Venita Freedman has been seen by home health occupational therapy services to address deficits ADLs, balance and functional mobility. Pt has been requesting for early discharge with occupational therapy to oblige. ADLs: Pt has been educated on adaptive equipment to include sock aid, reacher, long handled shoe horn and long handled sponge. Pt has progressed to completing lower body dressing with modified independence. Balance: Pt with decreased visits to address functional standing balance needed for ADLs, IADLs and house hold mobility due to decreased visits with pt requesting to d/c early. Pt balance on discharge tested was fair. Pt continues to use rolling walker for stanidng support. Functional mobility: Pt has progressed to completing toilet transfers with modified independence. Pt progressed to completing tub transfers with modified independence with use of tub transfer bench. Pt modified independence for bed mobility following spinal precautions using log roll technique. Pt medications have been reconciled. Pt reports new diagnosis of multiple myaloma which he is addressing with multiple doctors. Pt to discharge home with wife and family.

## 2023-09-09 NOTE — PROGRESS NOTES
Brian Penny is in the office today for cardiovascular reevaluation of his paroxysmal atrial fibrillation. He has a history of hypertension and hypercholesterolemia. In December 2015  he  was found to be in atrial flutter with 2:1 block with a heart rate of 135-140. He was given IV Lopressor and ultimately converted  to sinus rhythm. He continued to have recurrent problems with paroxysms of atrial fibrillation and ultimately had pulmonary vein isolation and ablation using [address] [facility] [address] [facility] [address] in November 2016. He was initially on amiodarone for a few months which was later discontinued. In the office today he reports that he has been in a lot of pain. He has had a compression fracture fracture of his lumbar vertebrae and had a recent kyphoplasty. He has an appointment to see neurosurgery. He is following with Dr. Nelly Brown for his bone disease. He reports that occasionally he feels his heart beating \"alejandra fast \". Plan; we will plan for a 48-hour Holter monitor and echocardiogram.    His blood pressure in the office today is 110/60. mmHg. Return in 2 to 3 weeks. PCP: Dwayne Jaeger MD       Past Medical History:   Diagnosis Date    Cardiac echocardiogram 01/08/2016    EF 60%. No WMA. Mod LVH. Gr 2 DDfx. Mild TIEN. Cardiac Holter monitor 02/12/2016    Mildly abnormal 48-hr Holter. Normal sinus rhythm w/a few short bursts of atrial fibrillation from 20-40 beats up to 170 bpm.  No symptoms reported. Cardiac nuclear imaging study, normal 01/08/2016    Low risk. No ischemia or prior infarction. No RWMA. EF 63%. Neg EKG on pharm stress test.    Cardiac transesophageal echocardiography (WAYLON) 11/08/2016    EF 55%. No WMA. LVH. Mild LAE. No LA appendage thrombus. Mod TIEN.       Hypercholesterolemia     Hypertension     YOAN treated with BiPAP     Typical atrial flutter ([address]) 12/19/2015    atrial flutter with 2 to1 block at a rate of 135 on EKG       Past Surgical

## 2023-09-12 ENCOUNTER — HOSPITAL ENCOUNTER (OUTPATIENT)
Facility: HOSPITAL | Age: 73
Discharge: HOME | End: 2023-09-12
Attending: RADIOLOGY | Admitting: RADIOLOGY
Payer: MEDICARE

## 2023-09-12 VITALS
OXYGEN SATURATION: 99 % | HEIGHT: 69 IN | DIASTOLIC BLOOD PRESSURE: 80 MMHG | HEART RATE: 84 BPM | WEIGHT: 217 LBS | BODY MASS INDEX: 32.14 KG/M2 | RESPIRATION RATE: 13 BRPM | SYSTOLIC BLOOD PRESSURE: 146 MMHG

## 2023-09-12 DIAGNOSIS — D47.2 MONOCLONAL GAMMOPATHY: ICD-10-CM

## 2023-09-12 DIAGNOSIS — M80.08XG FRACTURE OF VERTEBRA DUE TO OSTEOPOROSIS WITH DELAYED HEALING, SUBSEQUENT ENCOUNTER: Primary | ICD-10-CM

## 2023-09-12 LAB
ANION GAP SERPL CALC-SCNC: 9 MMOL/L (ref 3–18)
APTT PPP: <20 SEC (ref 23–36.4)
BASOPHILS # BLD: 0 K/UL (ref 0–0.1)
BASOPHILS NFR BLD: 0 % (ref 0–2)
BUN SERPL-MCNC: 40 MG/DL (ref 7–18)
BUN/CREAT SERPL: 34 (ref 12–20)
CALCIUM SERPL-MCNC: 9.3 MG/DL (ref 8.5–10.1)
CHLORIDE SERPL-SCNC: 106 MMOL/L (ref 100–111)
CO2 SERPL-SCNC: 22 MMOL/L (ref 21–32)
CREAT SERPL-MCNC: 1.16 MG/DL (ref 0.6–1.3)
DIFFERENTIAL METHOD BLD: ABNORMAL
EOSINOPHIL # BLD: 0 K/UL (ref 0–0.4)
EOSINOPHIL NFR BLD: 0 % (ref 0–5)
ERYTHROCYTE [DISTWIDTH] IN BLOOD BY AUTOMATED COUNT: 15.8 % (ref 11.6–14.5)
GLUCOSE SERPL-MCNC: 98 MG/DL (ref 74–99)
HCT VFR BLD AUTO: 28.2 % (ref 36–48)
HGB BLD-MCNC: 9.5 G/DL (ref 13–16)
IMM GRANULOCYTES # BLD AUTO: 0.1 K/UL (ref 0–0.04)
IMM GRANULOCYTES NFR BLD AUTO: 1 % (ref 0–0.5)
INR PPP: 1 (ref 0.9–1.1)
LYMPHOCYTES # BLD: 2.8 K/UL (ref 0.9–3.6)
LYMPHOCYTES NFR BLD: 34 % (ref 21–52)
MCH RBC QN AUTO: 33.8 PG (ref 24–34)
MCHC RBC AUTO-ENTMCNC: 33.7 G/DL (ref 31–37)
MCV RBC AUTO: 100.4 FL (ref 78–100)
MONOCYTES # BLD: 0.5 K/UL (ref 0.05–1.2)
MONOCYTES NFR BLD: 6 % (ref 3–10)
NEUTS SEG # BLD: 4.8 K/UL (ref 1.8–8)
NEUTS SEG NFR BLD: 59 % (ref 40–73)
NRBC # BLD: 0.05 K/UL (ref 0–0.01)
NRBC BLD-RTO: 0.6 PER 100 WBC
PLATELET # BLD AUTO: 291 K/UL (ref 135–420)
PMV BLD AUTO: 9.2 FL (ref 9.2–11.8)
POTASSIUM SERPL-SCNC: 3 MMOL/L (ref 3.5–5.5)
PROTHROMBIN TIME: 13.7 SEC (ref 11.9–14.7)
RBC # BLD AUTO: 2.81 M/UL (ref 4.35–5.65)
SODIUM SERPL-SCNC: 137 MMOL/L (ref 136–145)
WBC # BLD AUTO: 8.1 K/UL (ref 4.6–13.2)

## 2023-09-12 PROCEDURE — 85025 COMPLETE CBC W/AUTO DIFF WBC: CPT

## 2023-09-12 PROCEDURE — 85610 PROTHROMBIN TIME: CPT

## 2023-09-12 PROCEDURE — 6360000002 HC RX W HCPCS: Performed by: RADIOLOGY

## 2023-09-12 PROCEDURE — 88264 CHROMOSOME ANALYSIS 20-25: CPT

## 2023-09-12 PROCEDURE — 80048 BASIC METABOLIC PNL TOTAL CA: CPT

## 2023-09-12 PROCEDURE — 88342 IMHCHEM/IMCYTCHM 1ST ANTB: CPT

## 2023-09-12 PROCEDURE — 88313 SPECIAL STAINS GROUP 2: CPT

## 2023-09-12 PROCEDURE — 88185 FLOWCYTOMETRY/TC ADD-ON: CPT

## 2023-09-12 PROCEDURE — 88237 TISSUE CULTURE BONE MARROW: CPT

## 2023-09-12 PROCEDURE — 88311 DECALCIFY TISSUE: CPT

## 2023-09-12 PROCEDURE — 88305 TISSUE EXAM BY PATHOLOGIST: CPT

## 2023-09-12 PROCEDURE — 99156 MOD SED OTH PHYS/QHP 5/>YRS: CPT

## 2023-09-12 PROCEDURE — 2500000003 HC RX 250 WO HCPCS: Performed by: RADIOLOGY

## 2023-09-12 PROCEDURE — 85730 THROMBOPLASTIN TIME PARTIAL: CPT

## 2023-09-12 PROCEDURE — 88184 FLOWCYTOMETRY/ TC 1 MARKER: CPT

## 2023-09-12 RX ORDER — FENTANYL CITRATE 50 UG/ML
INJECTION, SOLUTION INTRAMUSCULAR; INTRAVENOUS PRN
Status: COMPLETED | OUTPATIENT
Start: 2023-09-12 | End: 2023-09-12

## 2023-09-12 RX ORDER — MIDAZOLAM HYDROCHLORIDE 2 MG/2ML
INJECTION, SOLUTION INTRAMUSCULAR; INTRAVENOUS PRN
Status: COMPLETED | OUTPATIENT
Start: 2023-09-12 | End: 2023-09-12

## 2023-09-12 RX ORDER — SODIUM CHLORIDE 9 MG/ML
INJECTION, SOLUTION INTRAVENOUS CONTINUOUS
Status: DISCONTINUED | OUTPATIENT
Start: 2023-09-12 | End: 2023-09-12 | Stop reason: HOSPADM

## 2023-09-12 RX ORDER — LIDOCAINE HYDROCHLORIDE 10 MG/ML
INJECTION, SOLUTION EPIDURAL; INFILTRATION; INTRACAUDAL; PERINEURAL PRN
Status: COMPLETED | OUTPATIENT
Start: 2023-09-12 | End: 2023-09-12

## 2023-09-12 RX ADMIN — LIDOCAINE HYDROCHLORIDE 10 ML: 10 INJECTION, SOLUTION EPIDURAL; INFILTRATION; INTRACAUDAL; PERINEURAL at 09:35

## 2023-09-12 RX ADMIN — MIDAZOLAM HYDROCHLORIDE 1 MG: 1 INJECTION, SOLUTION INTRAMUSCULAR; INTRAVENOUS at 09:27

## 2023-09-12 RX ADMIN — MIDAZOLAM HYDROCHLORIDE 1 MG: 1 INJECTION, SOLUTION INTRAMUSCULAR; INTRAVENOUS at 09:34

## 2023-09-12 RX ADMIN — FENTANYL CITRATE 50 MCG: 50 INJECTION INTRAMUSCULAR; INTRAVENOUS at 09:27

## 2023-09-12 RX ADMIN — FENTANYL CITRATE 50 MCG: 50 INJECTION INTRAMUSCULAR; INTRAVENOUS at 09:34

## 2023-09-12 ASSESSMENT — PAIN SCALES - GENERAL: PAINLEVEL_OUTOF10: 6

## 2023-09-12 NOTE — PROGRESS NOTES
Cath holding summary:    0710: Patient ambulated from waiting area without difficulty, placed on monitor. A&Ox4, no c/o. ID, NPO status, allergies, home meds verified, and H&P reviewed. PIV x1 inserted, blood sent to lab. Consent ready for signature. Provider notified of patient arrival.     0900: TRANSFER - OUT REPORT: Verbal report given to IR Angio on Juana Champagne  being transferred to IR for ordered procedure. Report consisted of patient's Situation, Background, Assessment and Recommendations(SBAR). Information from the following report(s) Adult Overview, Intake/Output, MAR, Recent Results, Pre Procedure Checklist, Procedure Verification, Quality Measures, and Neuro Assessment was reviewed with the receiving nurse. 1000: TRANSFER - IN REPORT: Verbal report received from [facility] on [facility]  being received from IR Angio for routine post-op Report consisted of patient's Situation, Background, Assessment and Recommendations(SBAR). Information from the following report(s) Nurse Handoff Report, MAR, Recent Results, Procedure Verification, Quality Measures, Neuro Assessment, and Event Log was reviewed with the receiving nurse. Opportunity for questions and clarification was provided. Assessment completed upon patient's arrival to unit and care assumed. 1030: Patient given beverage and snack. No complaints at this time. Support person bedside    1100: AVS Discharge instructions reviewed with patient and copy given to patient. All questions answered. Patient verbalized understanding to all discharge instructions. PIV removed. Procedural site within normal limits. No hematoma or bleeding noted from procedural and PIV site. No pain noted at discharge. Patient discharged with support person in stable condition. Escorted out to vehicle for transport home.

## 2023-09-12 NOTE — CONSULTS
Interventional Radiology Clinic Consultation Note    Patient: Monty Hernandes               Sex: male         Date of Visit: 9/12/23       YOB: 1950       Age:  67 y.o. Referring Physician: Dr. Michel Lin         HPI:     Monty Hernandes is a 67 y.o. male who has been referred for evaluation of T12 and L3 compression fracture from Dr Michel Lin. He is well known to our service with prior history of acute compression fractures of L2 and L4 - unprovoked (pain began after carrying groceries). Kyphoplasty of L2 and L4 was done 7/27/23. Post procedure back pain prompted further MRI revealing an L1 compression fracture, subsequently treated with additional kyphoplasty 7/31/23. The patient has a past medical history of monoclonal gammopathy (undergoing workup currently), osteoporosis - referred to endocrinologist Dr. Drea Arechiga, lumbar spondylosis, and HTN. He is not on blood thinning medications and does not have a known history of malignancy. Monty Hernandes did not experience a fall. Reports worsening of his chronic pain which prompted another MRI which showed new compression fracture of T12 and L3. MRI obtained 8/16/23 demonstrates moderate compression fracture of T12 and L3 with associated edema and <25% height loss. The pain is described as constant aching, that radiates to his right flank. The patient reports improved discomfort in his prior treatment areas. Back pain is ranked 6/10 when aggravated and improves at rest. Aggravating factors include bending over and ambulating. Endorses feeling easily fatigued. The back pain is refractory to conservative management such as  back brace, NSAIDs, physical therapy, lidocaine patches, thermal packs. Back pain is significantly impacting the patient's activities of daily life such as his mobility. Monty Hernandes now ambulates with the assistance of a walker.     The patient denies any new change in bowel or bladder movements,

## 2023-09-12 NOTE — H&P
History and Physical    Patient: Gabriel Sommer           Sex: male       DOA: 9/12/2023  YOB: 1950      Age:  67 y.o.     LOS:  LOS: 0 days        HPI:     Gabriel Sommer is a 67 y.o. male with history of monoclonal gammopathy and multiple vertebral compression fractures here for a bone marrow biopsy with moderate sedation. Past Medical History:   Diagnosis Date    Abnormal nuclear cardiac imaging test 01/08/2016    Low risk. No ischemia or prior infarction. No RWMA. EF 63%. Neg EKG on pharm stress test.    Arthritis     BACK, KNEES    Echocardiogram abnormal 01/08/2016    EF 60%. No WMA. Mod LVH. Gr 2 DDfx. Mild TIEN. H/O transesophageal echocardiography (WAYLON) for monitoring 11/08/2016    EF 55%. No WMA. LVH. Mild LAE. No LA appendage thrombus. Mod TIEN. Holter monitor, abnormal 02/12/2016    Mildly abnormal 48-hr Holter. Normal sinus rhythm w/a few short bursts of atrial fibrillation from 20-40 beats up to 170 bpm.  No symptoms reported.     Hypercholesterolemia     Hypertension     YOAN treated with BiPAP     Typical atrial flutter ([address]) 12/19/2015    atrial flutter with 2 to1 block at a rate of 135 on EKG       Past Surgical History:   Procedure Laterality Date    CATARACT REMOVAL Bilateral     COLOSTOMY CLOSURE N/A 2019    took 10 inches of colon out because of a blockage     HEMORRHOID SURGERY      IR KYPHOPLASTY LUMBAR FIRST LEVEL  7/27/2023    IR KYPHOPLASTY LUMBAR FIRST LEVEL 7/27/2023 Vincenzo Milton [facility] IR    IR KYPHOPLASTY LUMBAR FIRST LEVEL  7/31/2023    IR KYPHOPLASTY LUMBAR FIRST LEVEL 7/31/2023 Vincenzo Milton MD [facility] IR    NV UNLISTED PROCEDURE ABDOMEN PERITONEUM & OMENTUM      COLECOMY WITH COLOSTOMY - D/T BLOCKAGE    NV UNLISTED PROCEDURE CARDIAC SURGERY      ablation  11/16       Family History   Problem Relation Age of Onset    Colon Cancer Mother     Cancer Mother         COLON    Heart Disease Mother         a fib    Colon

## 2023-09-12 NOTE — PROGRESS NOTES
TRANSFER - OUT REPORT:    Verbal report given to Jennifer Ochoa RN on Juana Daly  being transferred to [facility] for routine post-op       Report consisted of patient's Situation, Background, Assessment and   Recommendations(SBAR). Information from the following report(s) Nurse Handoff Report was reviewed with the receiving nurse. Lines:       Opportunity for questions and clarification was provided.       Patient transported with:  Registered Nurse

## 2023-09-12 NOTE — DISCHARGE INSTRUCTIONS
DISCHARGE SUMMARY from Nurse    PATIENT INSTRUCTIONS:    After general anesthesia or intravenous sedation, for 24 hours or while taking prescription Narcotics:  Limit your activities  Do not drive and operate hazardous machinery  Do not make important personal or business decisions  Do  not drink alcoholic beverages  If you have not urinated within 8 hours after discharge, please contact your surgeon on call. Report the following to your surgeon:  Excessive pain, swelling, redness or odor of or around the surgical area  Temperature over 100.5  Nausea and vomiting lasting longer than 4 hours or if unable to take medications  Any signs of decreased circulation or nerve impairment to extremity: change in color, persistent  numbness, tingling, coldness or increase pain  Any questions    What to do at Home:  Recommended activity: activity as tolerated and no driving for today    *  Please give a list of your current medications to your Primary Care Provider. *  Please update this list whenever your medications are discontinued, doses are      changed, or new medications (including over-the-counter products) are added. *  Please carry medication information at all times in case of emergency situations. These are general instructions for a healthy lifestyle:    No smoking/ No tobacco products/ Avoid exposure to second hand smoke  Surgeon General's Warning:  Quitting smoking now greatly reduces serious risk to your health. Obesity, smoking, and sedentary lifestyle greatly increases your risk for illness    A healthy diet, regular physical exercise & weight monitoring are important for maintaining a healthy lifestyle    You may be retaining fluid if you have a history of heart failure or if you experience any of the following symptoms:  Weight gain of 3 pounds or more overnight or 5 pounds in a week, increased swelling in our hands or feet or shortness of breath while lying flat in bed.   Please call your

## 2023-09-13 RX ORDER — SODIUM CHLORIDE 9 MG/ML
INJECTION, SOLUTION INTRAVENOUS CONTINUOUS
OUTPATIENT
Start: 2023-09-13

## 2023-09-13 NOTE — PROCEDURES
RADIOLOGY POST PROCEDURE NOTE     September 13, 2023       10:08 AM     Preoperative Diagnosis:   Monoclonal gammopathy of unknown significance. Postoperative Diagnosis:  Same. :  Dr. Ania Mcgregor    Assistant:  None. Type of Anesthesia: 1% lidocaine and IV moderate sedation with Versed and fentanyl. Procedure/Description: Image guided bone marrow biopsy. Findings:   No bleeding. Estimated blood Loss: Minimal    Specimen Removed: Yes    Blood transfusions:  None. Implants:  None. Complications: None    Condition: Stable    Discharge Plan: Discharge home if stable and no bleeding in 1 hour. Resume blood thinners if any in 24 hours.     Monica Willis MD

## 2023-09-14 ENCOUNTER — OFFICE VISIT (OUTPATIENT)
Age: 73
End: 2023-09-14

## 2023-09-14 ENCOUNTER — ANESTHESIA (OUTPATIENT)
Facility: HOSPITAL | Age: 73
End: 2023-09-14

## 2023-09-14 ENCOUNTER — HOSPITAL ENCOUNTER (OUTPATIENT)
Facility: HOSPITAL | Age: 73
Discharge: HOME | End: 2023-09-17
Attending: RADIOLOGY | Admitting: RADIOLOGY
Payer: MEDICARE

## 2023-09-14 ENCOUNTER — HOSPITAL ENCOUNTER (OUTPATIENT)
Facility: HOSPITAL | Age: 73
Discharge: HOME | End: 2023-09-17
Payer: MEDICARE

## 2023-09-14 ENCOUNTER — ANESTHESIA EVENT (OUTPATIENT)
Facility: HOSPITAL | Age: 73
End: 2023-09-14

## 2023-09-14 VITALS
TEMPERATURE: 97.7 F | HEART RATE: 95 BPM | RESPIRATION RATE: 18 BRPM | DIASTOLIC BLOOD PRESSURE: 66 MMHG | OXYGEN SATURATION: 97 % | SYSTOLIC BLOOD PRESSURE: 103 MMHG

## 2023-09-14 VITALS
SYSTOLIC BLOOD PRESSURE: 91 MMHG | TEMPERATURE: 97.8 F | HEART RATE: 87 BPM | BODY MASS INDEX: 31.99 KG/M2 | HEIGHT: 69 IN | OXYGEN SATURATION: 96 % | DIASTOLIC BLOOD PRESSURE: 51 MMHG | WEIGHT: 216 LBS | RESPIRATION RATE: 14 BRPM

## 2023-09-14 VITALS
HEART RATE: 101 BPM | SYSTOLIC BLOOD PRESSURE: 120 MMHG | BODY MASS INDEX: 31.99 KG/M2 | WEIGHT: 216 LBS | DIASTOLIC BLOOD PRESSURE: 72 MMHG | OXYGEN SATURATION: 99 % | HEIGHT: 69 IN

## 2023-09-14 DIAGNOSIS — M80.08XG FRACTURE OF VERTEBRA DUE TO OSTEOPOROSIS WITH DELAYED HEALING, SUBSEQUENT ENCOUNTER: ICD-10-CM

## 2023-09-14 DIAGNOSIS — I48.0 PAROXYSMAL ATRIAL FIBRILLATION (HCC): Primary | ICD-10-CM

## 2023-09-14 PROCEDURE — 7100000001 HC PACU RECOVERY - ADDTL 15 MIN

## 2023-09-14 PROCEDURE — 7100000010 HC PHASE II RECOVERY - FIRST 15 MIN

## 2023-09-14 PROCEDURE — 7100000011 HC PHASE II RECOVERY - ADDTL 15 MIN

## 2023-09-14 PROCEDURE — 22513 PERQ VERTEBRAL AUGMENTATION: CPT

## 2023-09-14 PROCEDURE — 22514 PERQ VERTEBRAL AUGMENTATION: CPT

## 2023-09-14 PROCEDURE — 7100000000 HC PACU RECOVERY - FIRST 15 MIN

## 2023-09-14 PROCEDURE — 6360000004 HC RX CONTRAST MEDICATION: Performed by: RADIOLOGY

## 2023-09-14 PROCEDURE — 2580000003 HC RX 258: Performed by: NURSE ANESTHETIST, CERTIFIED REGISTERED

## 2023-09-14 PROCEDURE — 6360000002 HC RX W HCPCS: Performed by: NURSE ANESTHETIST, CERTIFIED REGISTERED

## 2023-09-14 PROCEDURE — 2500000003 HC RX 250 WO HCPCS: Performed by: RADIOLOGY

## 2023-09-14 PROCEDURE — 2500000003 HC RX 250 WO HCPCS: Performed by: NURSE ANESTHETIST, CERTIFIED REGISTERED

## 2023-09-14 RX ORDER — SODIUM CHLORIDE 9 MG/ML
INJECTION, SOLUTION INTRAVENOUS CONTINUOUS PRN
Status: DISCONTINUED | OUTPATIENT
Start: 2023-09-14 | End: 2023-09-14 | Stop reason: SDUPTHER

## 2023-09-14 RX ORDER — SODIUM CHLORIDE 9 MG/ML
INJECTION, SOLUTION INTRAVENOUS PRN
Status: CANCELLED | OUTPATIENT
Start: 2023-09-14

## 2023-09-14 RX ORDER — ONDANSETRON 2 MG/ML
4 INJECTION INTRAMUSCULAR; INTRAVENOUS
Status: CANCELLED | OUTPATIENT
Start: 2023-09-14 | End: 2023-09-15

## 2023-09-14 RX ORDER — PROPOFOL 10 MG/ML
INJECTION, EMULSION INTRAVENOUS CONTINUOUS PRN
Status: DISCONTINUED | OUTPATIENT
Start: 2023-09-14 | End: 2023-09-14 | Stop reason: SDUPTHER

## 2023-09-14 RX ORDER — KETOROLAC TROMETHAMINE 15 MG/ML
INJECTION, SOLUTION INTRAMUSCULAR; INTRAVENOUS PRN
Status: DISCONTINUED | OUTPATIENT
Start: 2023-09-14 | End: 2023-09-14 | Stop reason: SDUPTHER

## 2023-09-14 RX ORDER — LIDOCAINE HYDROCHLORIDE 10 MG/ML
INJECTION, SOLUTION EPIDURAL; INFILTRATION; INTRACAUDAL; PERINEURAL PRN
Status: DISCONTINUED | OUTPATIENT
Start: 2023-09-14 | End: 2023-09-18 | Stop reason: HOSPADM

## 2023-09-14 RX ORDER — FENTANYL CITRATE 50 UG/ML
50 INJECTION, SOLUTION INTRAMUSCULAR; INTRAVENOUS EVERY 5 MIN PRN
Status: DISCONTINUED | OUTPATIENT
Start: 2023-09-14 | End: 2023-09-20 | Stop reason: HOSPADM

## 2023-09-14 RX ORDER — SODIUM CHLORIDE 0.9 % (FLUSH) 0.9 %
5-40 SYRINGE (ML) INJECTION PRN
Status: CANCELLED | OUTPATIENT
Start: 2023-09-14

## 2023-09-14 RX ORDER — SODIUM CHLORIDE 0.9 % (FLUSH) 0.9 %
5-40 SYRINGE (ML) INJECTION EVERY 12 HOURS SCHEDULED
Status: DISCONTINUED | OUTPATIENT
Start: 2023-09-14 | End: 2023-09-20 | Stop reason: HOSPADM

## 2023-09-14 RX ORDER — MAGNESIUM SULFATE 1 G/100ML
INJECTION INTRAVENOUS PRN
Status: DISCONTINUED | OUTPATIENT
Start: 2023-09-14 | End: 2023-09-14 | Stop reason: SDUPTHER

## 2023-09-14 RX ORDER — SODIUM CHLORIDE 0.9 % (FLUSH) 0.9 %
5-40 SYRINGE (ML) INJECTION EVERY 12 HOURS SCHEDULED
Status: CANCELLED | OUTPATIENT
Start: 2023-09-14

## 2023-09-14 RX ORDER — SODIUM CHLORIDE 0.9 % (FLUSH) 0.9 %
5-40 SYRINGE (ML) INJECTION PRN
Status: DISCONTINUED | OUTPATIENT
Start: 2023-09-14 | End: 2023-09-20 | Stop reason: HOSPADM

## 2023-09-14 RX ORDER — ONDANSETRON 2 MG/ML
INJECTION INTRAMUSCULAR; INTRAVENOUS PRN
Status: DISCONTINUED | OUTPATIENT
Start: 2023-09-14 | End: 2023-09-14 | Stop reason: SDUPTHER

## 2023-09-14 RX ORDER — FENTANYL CITRATE 50 UG/ML
INJECTION, SOLUTION INTRAMUSCULAR; INTRAVENOUS PRN
Status: DISCONTINUED | OUTPATIENT
Start: 2023-09-14 | End: 2023-09-14 | Stop reason: SDUPTHER

## 2023-09-14 RX ORDER — FENTANYL CITRATE 50 UG/ML
50 INJECTION, SOLUTION INTRAMUSCULAR; INTRAVENOUS EVERY 5 MIN PRN
Status: CANCELLED | OUTPATIENT
Start: 2023-09-14

## 2023-09-14 RX ORDER — FENTANYL CITRATE 50 UG/ML
100 INJECTION, SOLUTION INTRAMUSCULAR; INTRAVENOUS EVERY 5 MIN PRN
Status: CANCELLED | OUTPATIENT
Start: 2023-09-14

## 2023-09-14 RX ORDER — DEXAMETHASONE SODIUM PHOSPHATE 4 MG/ML
INJECTION, SOLUTION INTRA-ARTICULAR; INTRALESIONAL; INTRAMUSCULAR; INTRAVENOUS; SOFT TISSUE PRN
Status: DISCONTINUED | OUTPATIENT
Start: 2023-09-14 | End: 2023-09-14 | Stop reason: SDUPTHER

## 2023-09-14 RX ORDER — ONDANSETRON 2 MG/ML
4 INJECTION INTRAMUSCULAR; INTRAVENOUS
Status: ACTIVE | OUTPATIENT
Start: 2023-09-14 | End: 2023-09-15

## 2023-09-14 RX ORDER — SODIUM CHLORIDE 9 MG/ML
INJECTION, SOLUTION INTRAVENOUS PRN
Status: DISCONTINUED | OUTPATIENT
Start: 2023-09-14 | End: 2023-09-20 | Stop reason: HOSPADM

## 2023-09-14 RX ORDER — FENTANYL CITRATE 50 UG/ML
100 INJECTION, SOLUTION INTRAMUSCULAR; INTRAVENOUS EVERY 5 MIN PRN
Status: DISCONTINUED | OUTPATIENT
Start: 2023-09-14 | End: 2023-09-20 | Stop reason: HOSPADM

## 2023-09-14 RX ADMIN — FENTANYL CITRATE 25 MCG: 50 INJECTION INTRAMUSCULAR; INTRAVENOUS at 12:56

## 2023-09-14 RX ADMIN — SODIUM CHLORIDE: 900 INJECTION, SOLUTION INTRAVENOUS at 12:07

## 2023-09-14 RX ADMIN — LIDOCAINE HYDROCHLORIDE 10 ML: 10 INJECTION, SOLUTION EPIDURAL; INFILTRATION; INTRACAUDAL; PERINEURAL at 13:12

## 2023-09-14 RX ADMIN — FENTANYL CITRATE 25 MCG: 50 INJECTION INTRAMUSCULAR; INTRAVENOUS at 12:33

## 2023-09-14 RX ADMIN — MAGNESIUM SULFATE HEPTAHYDRATE 1000 MG: 1 INJECTION, SOLUTION INTRAVENOUS at 12:06

## 2023-09-14 RX ADMIN — KETOROLAC TROMETHAMINE 7.5 MG: 15 INJECTION, SOLUTION INTRAMUSCULAR; INTRAVENOUS at 13:02

## 2023-09-14 RX ADMIN — DEXAMETHASONE SODIUM PHOSPHATE 4 MG: 4 INJECTION, SOLUTION INTRAMUSCULAR; INTRAVENOUS at 12:06

## 2023-09-14 RX ADMIN — IOHEXOL 40 ML: 300 INJECTION, SOLUTION INTRAVENOUS at 13:12

## 2023-09-14 RX ADMIN — LIDOCAINE HYDROCHLORIDE 60 MG: 10 INJECTION, SOLUTION EPIDURAL; INFILTRATION; INTRACAUDAL; PERINEURAL at 12:06

## 2023-09-14 RX ADMIN — FENTANYL CITRATE 50 MCG: 50 INJECTION INTRAMUSCULAR; INTRAVENOUS at 12:10

## 2023-09-14 RX ADMIN — ONDANSETRON 4 MG: 2 INJECTION INTRAMUSCULAR; INTRAVENOUS at 12:06

## 2023-09-14 RX ADMIN — PROPOFOL 120 MCG/KG/MIN: 10 INJECTION, EMULSION INTRAVENOUS at 12:06

## 2023-09-14 RX ADMIN — SODIUM CHLORIDE 2000 MG: 9 INJECTION, SOLUTION INTRAVENOUS at 12:11

## 2023-09-14 ASSESSMENT — PAIN SCALES - GENERAL
PAINLEVEL_OUTOF10: 0
PAINLEVEL_OUTOF10: 4
PAINLEVEL_OUTOF10: 0
PAINLEVEL_OUTOF10: 0

## 2023-09-14 ASSESSMENT — PATIENT HEALTH QUESTIONNAIRE - PHQ9
2. FEELING DOWN, DEPRESSED OR HOPELESS: 0
SUM OF ALL RESPONSES TO PHQ QUESTIONS 1-9: 0
SUM OF ALL RESPONSES TO PHQ QUESTIONS 1-9: 0
1. LITTLE INTEREST OR PLEASURE IN DOING THINGS: 0
SUM OF ALL RESPONSES TO PHQ9 QUESTIONS 1 & 2: 0
SUM OF ALL RESPONSES TO PHQ QUESTIONS 1-9: 0
SUM OF ALL RESPONSES TO PHQ QUESTIONS 1-9: 0

## 2023-09-14 NOTE — ANESTHESIA PRE PROCEDURE
Department of Anesthesiology  Preprocedure Note       Name:  Calin Jarrett   Age:  67 y.o.  :  1950                                          MRN:  [id number]         Date:  2023      Surgeon: * No surgeons listed *    Procedure: * No procedures listed *    Medications prior to admission:   Prior to Admission medications    Medication Sig Start Date End Date Taking? Authorizing Provider   dexamethasone (DECADRON) 4 MG tablet Take 1 tablet by mouth 5 (five) times a day Pt to take 5x a day for 4 days    Historical Provider, MD   HYDROcodone-acetaminophen (NORCO) 5-325 MG per tablet Take 1 tablet by mouth every 6 hours as needed for Pain.     Historical Provider, MD   cyclobenzaprine (FLEXERIL) 5 MG tablet Take 1 tablet by mouth 2 times daily as needed for Muscle spasms 23   Estefania Trent MD   acetaminophen (TYLENOL) 500 MG tablet Take 1 tablet by mouth 4 times daily as needed for Pain 23   Lizzette Falling, DO   ibuprofen (ADVIL;MOTRIN) 600 MG tablet Take 1 tablet by mouth every 8 hours as needed for Pain 23   Lizzette Falling, DO   tamsulosin (FLOMAX) 0.4 MG capsule Take 1 capsule by mouth daily 23   Cynthia Davalos MD   Psyllium (METAMUCIL PO) Take 1 Package by mouth daily    Historical Provider, MD   Ascorbic Acid (VITAMIN C) 100 MG CHEW Take 1 tablet by mouth daily    Historical Provider, MD   Omega-3 Fatty Acids (FISH OIL PO) Take 1 capsule by mouth daily    Historical Provider, MD   loratadine (CLARITIN) 10 MG tablet Take 1 tablet by mouth daily as needed (allergy symptoms)    Ar Automatic Reconciliation   metoprolol succinate (TOPROL XL) 50 MG extended release tablet TAKE 1 TABLET BY MOUTH DAILY 22   Ar Automatic Reconciliation   montelukast (SINGULAIR) 10 MG tablet Take 1 tablet by mouth daily    Ar Automatic Reconciliation   rosuvastatin (CRESTOR) 20 MG tablet Take 1 tablet by mouth daily 22   Ar Automatic Reconciliation       Current medications:    Current Outpatient

## 2023-09-14 NOTE — PROGRESS NOTES
Leigh Zarate presents today for   Chief Complaint   Patient presents with    Follow-up     Per damaris Zarate preferred language for health care discussion is english/other. Is someone accompanying this pt? no    Is the patient using any DME equipment during OV? no    Depression Screening:  Depression: Not at risk (3/29/2023)    PHQ-2     PHQ-2 Score: 0        Learning Assessment:  Who is the primary learner? Patient    What is the preferred language for health care of the primary learner? ENGLISH    How does the primary learner prefer to learn new concepts? DEMONSTRATION    Answered By patient    Relationship to Learner SELF           Pt currently taking Anticoagulant therapy? no    Pt currently taking Antiplatelet therapy ? no      Coordination of Care:  1. Have you been to the ER, urgent care clinic since your last visit? Hospitalized since your last visit? no    2. Have you seen or consulted any other health care providers outside of the [address] [place] [address] since your last visit? Include any pap smears or colon screening.  no

## 2023-09-14 NOTE — ANESTHESIA POSTPROCEDURE EVALUATION
Department of Anesthesiology  Postprocedure Note    Patient: Angelina Denver  MRN: [id number]  YOB: 1950  Date of evaluation: 9/14/2023      Procedure Summary     Date: 09/14/23 Room / Location: [address] [place] [address] ANGIO IR; [facility] CATH LAB    Anesthesia Start: 4161 Anesthesia Stop: 3103    Procedure: IR KYPHOPLASTY LUMBAR FIRST LEVEL Diagnosis:       Fracture of vertebra due to osteoporosis with delayed healing, subsequent encounter      (L3 acute compression fracute - kypho and biopsy)    Scheduled Providers:  Responsible Provider: Anabelle Patel MD    Anesthesia Type: MAC ASA Status: 3          Anesthesia Type: MAC    Megan Phase I: Megan Score: 10    Megan Phase II:        Anesthesia Post Evaluation    Patient location during evaluation: bedside  Patient participation: complete - patient participated  Airway patency: patent  Complications: no  Cardiovascular status: hemodynamically stable  Respiratory status: acceptable  Hydration status: stable

## 2023-09-14 NOTE — PROCEDURES
RADIOLOGY POST PROCEDURE NOTE     September 14, 2023       1:25 PM     Preoperative Diagnosis:   Subacute osteoporotic T12 and L3 compression fractures refractory to conservative measures with pain medications, rest and physical therapy. Postoperative Diagnosis:  Same. :  Dr. Gail Vargas    Assistant:  None. Type of Anesthesia: 1% local lidocaine IV deep sedation per anesthesia. Procedure/Description: Image guided T12 and L3 kyphoplasty. Findings:   No bleeding. Estimated blood Loss: Minimal    Specimen Removed: No    Blood transfusions:  None. Implants:  None. Complications: None    Condition: Stable    Discharge Plan: Discharge home if stable and no bleeding in 3 hours. Resume blood thinners if any in 24 hours.     Joy Rodas MD

## 2023-09-14 NOTE — H&P
Alzheimer's Disease Father     Heart Disease Father     Colon Polyps Sister     Colon Polyps Brother     Heart Disease Brother         a fib    No Known Problems Maternal Aunt     No Known Problems Maternal Uncle     No Known Problems Paternal Aunt     No Known Problems Paternal Uncle     No Known Problems Maternal Grandmother     No Known Problems Maternal Grandfather     No Known Problems Paternal Grandmother     No Known Problems Paternal Grandfather     No Known Problems Other        Social History     Socioeconomic History    Marital status:      Spouse name: None    Number of children: None    Years of education: None    Highest education level: None   Tobacco Use    Smoking status: Never    Smokeless tobacco: Never   Vaping Use    Vaping Use: Never used   Substance and Sexual Activity    Alcohol use: Yes    Drug use: No     Social Determinants of Health     Transportation Needs: No Transportation Needs (9/7/2023)    OASIS : Transportation     Lack of Transportation (Medical): No     Lack of Transportation (Non-Medical): No     Patient Unable or Declines to Respond: No   Social Connections: Feeling Socially Integrated (9/7/2023)    OASIS : Social Isolation     Frequency of experiencing loneliness or isolation: Never       Prior to Admission medications    Medication Sig Start Date End Date Taking? Authorizing Provider   dexamethasone (DECADRON) 4 MG tablet Take 1 tablet by mouth 5 (five) times a day Pt to take 5x a day for 4 days    Historical Provider, MD   HYDROcodone-acetaminophen (NORCO) 5-325 MG per tablet Take 1 tablet by mouth every 6 hours as needed for Pain.     Historical Provider, MD   cyclobenzaprine (FLEXERIL) 5 MG tablet Take 1 tablet by mouth 2 times daily as needed for Muscle spasms 8/7/23   Mckenzie Miner MD   acetaminophen (TYLENOL) 500 MG tablet Take 1 tablet by mouth 4 times daily as needed for Pain 8/2/23   Lorenza Armando DO   ibuprofen (ADVIL;MOTRIN) 600 MG tablet Take 1

## 2023-09-25 ENCOUNTER — TELEPHONE (OUTPATIENT)
Age: 73
End: 2023-09-25

## 2023-09-25 NOTE — TELEPHONE ENCOUNTER
----- Message from Jaqueline De Leon MD sent at 9/25/2023 11:07 AM EDT -----  Echocardiogram looks fine. 48-hour Holter monitor is still pending.  ----- Message -----  From: Kasandra Juan MA  Sent: 9/14/2023   1:33 PM EDT  To: Jaqueline De Leon MD    Per your last note \"  He continued to have recurrent problems with paroxysms of atrial fibrillation and ultimately had pulmonary vein isolation and ablation using [facility] in November 2016. He was initially on amiodarone for a few months which was later discontinued.  we will plan for a 48-hour Holter monitor and echocardiogram.

## 2023-09-28 NOTE — PROGRESS NOTES
Stewart Flores is in the office today for cardiovascular reevaluation of his paroxysmal atrial fibrillation. He has a history of hypertension and hypercholesterolemia. In December 2015  he  was found to be in atrial flutter with 2:1 block with a heart rate of 135-140. He was given IV Lopressor and ultimately converted  to sinus rhythm. He continued to have recurrent problems with paroxysms of atrial fibrillation and ultimately had pulmonary vein isolation and ablation using [address] in November 2016. He was initially on amiodarone for a few months which was later discontinued. At his last visit, he reported that he had been in a lot of pain. He has had a compression fracture fracture of his lumbar vertebrae and had a recent kyphoplasty. He has an appointment to see neurosurgery. He is following with Dr. Pauline Thornton for his bone disease. He reports that occasionally he feels his heart beating \"alejandra fast \". In the office today, he reports he has been diagnosed with multiple myeloma. He is scheduled for additional kyphoplasties. He has had no recent palpitations. He is occasionally lightheaded when he first gets up. At his last visit, an echo and Holter were ordered. The Holter monitor was completed on 8/29/2023. Rhythm was sinus. Average heart rate 99 bpm.  PACs. Very short runs of SVT. Minimal PVCs. The echocardiogram was done on 9/13/2023. Was 65 to 70%. There was moderately increased wall thickness. There was no significant valvular pathology. Plan; return in 4 months. PCP: Dannie Green MD       Past Medical History:   Diagnosis Date    Cardiac echocardiogram 01/08/2016    EF 60%. No WMA. Mod LVH. Gr 2 DDfx. Mild TIEN. Cardiac Holter monitor 02/12/2016    Mildly abnormal 48-hr Holter. Normal sinus rhythm w/a few short bursts of atrial fibrillation from 20-40 beats up to 170 bpm.  No symptoms reported.     Cardiac nuclear imaging study,

## 2023-10-20 ENCOUNTER — TRANSCRIBE ORDERS (OUTPATIENT)
Facility: HOSPITAL | Age: 73
End: 2023-10-20

## 2023-10-20 ENCOUNTER — HOSPITAL ENCOUNTER (OUTPATIENT)
Facility: HOSPITAL | Age: 73
Discharge: HOME | End: 2023-10-20
Attending: SPECIALIST
Payer: MEDICARE

## 2023-10-20 DIAGNOSIS — C79.51 METASTASIS TO BONE (HCC): ICD-10-CM

## 2023-10-20 DIAGNOSIS — C79.51 METASTASIS TO BONE (HCC): Primary | ICD-10-CM

## 2023-10-20 DIAGNOSIS — C90.00 OSTEOSCLEROTIC MYELOMA (HCC): ICD-10-CM

## 2023-10-20 PROCEDURE — 78816 PET IMAGE W/CT FULL BODY: CPT

## 2023-10-20 PROCEDURE — 2500000003 HC RX 250 WO HCPCS: Performed by: SPECIALIST

## 2023-10-20 PROCEDURE — 3430000000 HC RX DIAGNOSTIC RADIOPHARMACEUTICAL: Performed by: SPECIALIST

## 2023-10-20 PROCEDURE — A9552 F18 FDG: HCPCS | Performed by: SPECIALIST

## 2023-10-20 RX ORDER — FLUDEOXYGLUCOSE F-18 500 MCI/ML
12.14 INJECTION INTRAVENOUS
Status: COMPLETED | OUTPATIENT
Start: 2023-10-20 | End: 2023-10-20

## 2023-10-20 RX ADMIN — FLUDEOXYGLUCOSE F-18 12.14 MILLICURIE: 500 INJECTION INTRAVENOUS at 13:00

## 2023-10-20 RX ADMIN — BARIUM SULFATE 450 ML: 21 SUSPENSION ORAL at 13:00

## 2023-12-13 ENCOUNTER — HOSPITAL ENCOUNTER (OUTPATIENT)
Facility: HOSPITAL | Age: 73
Discharge: HOME | End: 2023-12-15
Attending: INTERNAL MEDICINE
Payer: MEDICARE

## 2023-12-13 DIAGNOSIS — R60.0 LEG EDEMA: ICD-10-CM

## 2023-12-13 PROCEDURE — 93971 EXTREMITY STUDY: CPT

## 2023-12-14 PROCEDURE — 93971 EXTREMITY STUDY: CPT | Performed by: STUDENT IN AN ORGANIZED HEALTH CARE EDUCATION/TRAINING PROGRAM

## 2024-01-22 RX ORDER — ROSUVASTATIN CALCIUM 20 MG/1
20 TABLET, COATED ORAL DAILY
Qty: 90 TABLET | Refills: 3 | Status: SHIPPED | OUTPATIENT
Start: 2024-01-22

## 2024-01-22 RX ORDER — METOPROLOL SUCCINATE 50 MG/1
50 TABLET, EXTENDED RELEASE ORAL DAILY
Qty: 90 TABLET | Refills: 3 | Status: SHIPPED | OUTPATIENT
Start: 2024-01-22

## 2024-02-02 RX ORDER — SODIUM CHLORIDE 9 MG/ML
INJECTION, SOLUTION INTRAVENOUS CONTINUOUS
OUTPATIENT
Start: 2024-02-02

## 2024-02-04 ENCOUNTER — HOSPITAL ENCOUNTER (EMERGENCY)
Facility: HOSPITAL | Age: 74
Discharge: HOME | End: 2024-02-04
Attending: EMERGENCY MEDICINE
Payer: MEDICARE

## 2024-02-04 VITALS
WEIGHT: 225 LBS | HEART RATE: 79 BPM | RESPIRATION RATE: 20 BRPM | SYSTOLIC BLOOD PRESSURE: 135 MMHG | TEMPERATURE: 98 F | DIASTOLIC BLOOD PRESSURE: 75 MMHG | BODY MASS INDEX: 33.23 KG/M2 | OXYGEN SATURATION: 99 %

## 2024-02-04 DIAGNOSIS — L03.115 CELLULITIS OF RIGHT THIGH: Primary | ICD-10-CM

## 2024-02-04 PROCEDURE — 99284 EMERGENCY DEPT VISIT MOD MDM: CPT

## 2024-02-04 PROCEDURE — 90715 TDAP VACCINE 7 YRS/> IM: CPT | Performed by: EMERGENCY MEDICINE

## 2024-02-04 PROCEDURE — 6370000000 HC RX 637 (ALT 250 FOR IP): Performed by: EMERGENCY MEDICINE

## 2024-02-04 PROCEDURE — 90471 IMMUNIZATION ADMIN: CPT | Performed by: EMERGENCY MEDICINE

## 2024-02-04 PROCEDURE — 6360000002 HC RX W HCPCS: Performed by: EMERGENCY MEDICINE

## 2024-02-04 RX ORDER — CEPHALEXIN 500 MG/1
500 CAPSULE ORAL 4 TIMES DAILY
Qty: 28 CAPSULE | Refills: 0 | Status: SHIPPED | OUTPATIENT
Start: 2024-02-04 | End: 2024-02-04

## 2024-02-04 RX ORDER — CEPHALEXIN 500 MG/1
500 CAPSULE ORAL 4 TIMES DAILY
Qty: 40 CAPSULE | Refills: 0 | Status: ON HOLD | OUTPATIENT
Start: 2024-02-04 | End: 2024-02-14

## 2024-02-04 RX ORDER — APIXABAN 5 MG/1
5 TABLET, FILM COATED ORAL 2 TIMES DAILY
Status: ON HOLD | COMMUNITY
Start: 2024-01-22

## 2024-02-04 RX ORDER — CEPHALEXIN 250 MG/1
500 CAPSULE ORAL
Status: COMPLETED | OUTPATIENT
Start: 2024-02-04 | End: 2024-02-04

## 2024-02-04 RX ADMIN — TETANUS TOXOID, REDUCED DIPHTHERIA TOXOID AND ACELLULAR PERTUSSIS VACCINE, ADSORBED 0.5 ML: 5; 2.5; 8; 8; 2.5 SUSPENSION INTRAMUSCULAR at 06:26

## 2024-02-04 RX ADMIN — CEPHALEXIN 500 MG: 250 CAPSULE ORAL at 06:26

## 2024-02-04 NOTE — DISCHARGE INSTRUCTIONS
Cellulitis: Care Instructions  Your Care Instructions     Cellulitis is a skin infection caused by bacteria, most often strep or staph. It often occurs after a break in the skin from a scrape, cut, bite, or puncture, or after a rash.  Cellulitis may be treated without doing tests to find out what caused it. But your doctor may do tests, if needed, to look for a specific bacteria, like methicillin-resistant Staphylococcus aureus (MRSA).  The doctor has checked you carefully, but problems can develop later. If you notice any problems or new symptoms, get medical treatment right away.  Follow-up care is a key part of your treatment and safety. Be sure to make and go to all appointments, and call your doctor if you are having problems. It's also a good idea to know your test results and keep a list of the medicines you take.  How can you care for yourself at home?  Take your antibiotics as directed. Do not stop taking them just because you feel better. You need to take the full course of antibiotics.  Prop up the infected area on pillows to reduce pain and swelling. Try to keep the area above the level of your heart as often as you can.  If your doctor told you how to care for your wound, follow your doctor's instructions. If you did not get instructions, follow this general advice:  Wash the wound with clean water 2 times a day. Don't use hydrogen peroxide or alcohol, which can slow healing.  You may cover the wound with a thin layer of petroleum jelly, such as Vaseline, and a nonstick bandage.  Apply more petroleum jelly and replace the bandage as needed.  Be safe with medicines. Take pain medicines exactly as directed.  If the doctor gave you a prescription medicine for pain, take it as prescribed.  If you are not taking a prescription pain medicine, ask your doctor if you can take an over-the-counter medicine.  To prevent cellulitis in the future  Try to prevent cuts, scrapes, or other injuries to your skin.

## 2024-02-04 NOTE — ED NOTES
Patient ambulatory to ED with concerns for a \"blood clot\". On assessment, he has a bump of redness and swelling to Rt inner thigh. Takes eliquis but paused it for one day d/t having a biopsy tomorrow. MD at bedside.

## 2024-02-04 NOTE — ED PROVIDER NOTES
`[facility] EMERGENCY DEPT  eMERGENCY dEPARTMENT eNCOUnter      Pt Name: Charles Trejo  MRN: [id number]  Birthdate 1950 of evaluation: 2/4/2024  Provider:Shady Roberts MD    CHIEF COMPLAINT       HPI    Charles Trejo is a 73 y.o. male  c/o having a swelling in his right upper thigh that he awakened from sleep with this am.  He is concern that he has a DVT. He states he developed a proximal 1 cm round red flat area in his right proximal media thigh that is non tender.   He denies having fever, chills. (+) hx of DVT    ROS    Review of Systems   Constitutional:  Negative for activity change.   Respiratory: Negative.     Cardiovascular: Negative.    Gastrointestinal: Negative.    Musculoskeletal:  Negative for arthralgias ((+) round flat area on media thigh).   Skin: round redden are  in right thigh    Except as noted above the remainder of the review of systems was reviewed and negative.       PAST MEDICAL HISTORY     Past Medical History:   Diagnosis Date    Abnormal nuclear cardiac imaging test 01/08/2016    Low risk.  No ischemia or prior infarction.  No RWMA.  EF 63%.  Neg EKG on pharm stress test.    Arthritis     BACK, KNEES    Echocardiogram abnormal 01/08/2016    EF 60%.  No WMA.  Mod LVH.  Gr 2 DDfx.  Mild TIEN.      H/O transesophageal echocardiography (WAYLON) for monitoring 11/08/2016    EF 55%.  No WMA.  LVH.  Mild LAE.  No LA appendage thrombus.  Mod TIEN.      Holter monitor, abnormal 02/12/2016    Mildly abnormal 48-hr Holter.  Normal sinus rhythm w/a few short bursts of atrial fibrillation from 20-40 beats up to 170 bpm.  No symptoms reported.    Hypercholesterolemia     Hypertension     YOAN treated with BiPAP     Typical atrial flutter (HCC) 12/19/2015    atrial flutter with 2 to1 block at a rate of 135 on EKG         SURGICAL HISTORY       Past Surgical History:   Procedure Laterality Date    CATARACT REMOVAL Bilateral     COLOSTOMY CLOSURE N/A 2019    took 10 inches of colon out because of a blockage

## 2024-02-05 ENCOUNTER — HOSPITAL ENCOUNTER (OUTPATIENT)
Facility: HOSPITAL | Age: 74
Discharge: HOME | End: 2024-02-05
Attending: RADIOLOGY | Admitting: RADIOLOGY
Payer: MEDICARE

## 2024-02-05 VITALS
OXYGEN SATURATION: 97 % | DIASTOLIC BLOOD PRESSURE: 76 MMHG | SYSTOLIC BLOOD PRESSURE: 120 MMHG | BODY MASS INDEX: 33.33 KG/M2 | HEART RATE: 72 BPM | WEIGHT: 225 LBS | RESPIRATION RATE: 22 BRPM | HEIGHT: 69 IN

## 2024-02-05 DIAGNOSIS — C90.00 MULTIPLE MYELOMA, REMISSION STATUS UNSPECIFIED (HCC): ICD-10-CM

## 2024-02-05 LAB
ANION GAP SERPL CALC-SCNC: 2 MMOL/L (ref 3–18)
APTT PPP: 22.8 SEC (ref 23–36.4)
BASOPHILS # BLD: 0.1 K/UL (ref 0–0.1)
BASOPHILS NFR BLD: 1 % (ref 0–2)
BUN SERPL-MCNC: 16 MG/DL (ref 7–18)
BUN/CREAT SERPL: 14 (ref 12–20)
CALCIUM SERPL-MCNC: 9.2 MG/DL (ref 8.5–10.1)
CHLORIDE SERPL-SCNC: 112 MMOL/L (ref 100–111)
CO2 SERPL-SCNC: 27 MMOL/L (ref 21–32)
CREAT SERPL-MCNC: 1.15 MG/DL (ref 0.6–1.3)
DIFFERENTIAL METHOD BLD: ABNORMAL
EOSINOPHIL # BLD: 0.1 K/UL (ref 0–0.4)
EOSINOPHIL NFR BLD: 2 % (ref 0–5)
ERYTHROCYTE [DISTWIDTH] IN BLOOD BY AUTOMATED COUNT: 16.1 % (ref 11.6–14.5)
GLUCOSE SERPL-MCNC: 107 MG/DL (ref 74–99)
HCT VFR BLD AUTO: 37.4 % (ref 36–48)
HGB BLD-MCNC: 12.6 G/DL (ref 13–16)
IMM GRANULOCYTES # BLD AUTO: 0 K/UL (ref 0–0.04)
IMM GRANULOCYTES NFR BLD AUTO: 0 % (ref 0–0.5)
INR PPP: 1.1 (ref 0.9–1.1)
LYMPHOCYTES # BLD: 1.8 K/UL (ref 0.9–3.6)
LYMPHOCYTES NFR BLD: 26 % (ref 21–52)
MCH RBC QN AUTO: 32.7 PG (ref 24–34)
MCHC RBC AUTO-ENTMCNC: 33.7 G/DL (ref 31–37)
MCV RBC AUTO: 97.1 FL (ref 78–100)
MONOCYTES # BLD: 0.8 K/UL (ref 0.05–1.2)
MONOCYTES NFR BLD: 12 % (ref 3–10)
NEUTS SEG # BLD: 4.2 K/UL (ref 1.8–8)
NEUTS SEG NFR BLD: 59 % (ref 40–73)
NRBC # BLD: 0 K/UL (ref 0–0.01)
NRBC BLD-RTO: 0 PER 100 WBC
PLATELET # BLD AUTO: 188 K/UL (ref 135–420)
PMV BLD AUTO: 10.6 FL (ref 9.2–11.8)
POTASSIUM SERPL-SCNC: 4.9 MMOL/L (ref 3.5–5.5)
PROTHROMBIN TIME: 14.4 SEC (ref 11.9–14.7)
RBC # BLD AUTO: 3.85 M/UL (ref 4.35–5.65)
SODIUM SERPL-SCNC: 141 MMOL/L (ref 136–145)
WBC # BLD AUTO: 7 K/UL (ref 4.6–13.2)

## 2024-02-05 PROCEDURE — 88237 TISSUE CULTURE BONE MARROW: CPT

## 2024-02-05 PROCEDURE — 99157 MOD SED OTHER PHYS/QHP EA: CPT

## 2024-02-05 PROCEDURE — 88305 TISSUE EXAM BY PATHOLOGIST: CPT

## 2024-02-05 PROCEDURE — 88313 SPECIAL STAINS GROUP 2: CPT

## 2024-02-05 PROCEDURE — 88311 DECALCIFY TISSUE: CPT

## 2024-02-05 PROCEDURE — 88264 CHROMOSOME ANALYSIS 20-25: CPT

## 2024-02-05 PROCEDURE — 88185 FLOWCYTOMETRY/TC ADD-ON: CPT

## 2024-02-05 PROCEDURE — 2500000003 HC RX 250 WO HCPCS: Performed by: RADIOLOGY

## 2024-02-05 PROCEDURE — 6360000002 HC RX W HCPCS: Performed by: RADIOLOGY

## 2024-02-05 PROCEDURE — 85730 THROMBOPLASTIN TIME PARTIAL: CPT

## 2024-02-05 PROCEDURE — 80048 BASIC METABOLIC PNL TOTAL CA: CPT

## 2024-02-05 PROCEDURE — 88184 FLOWCYTOMETRY/ TC 1 MARKER: CPT

## 2024-02-05 PROCEDURE — 85610 PROTHROMBIN TIME: CPT

## 2024-02-05 PROCEDURE — 85025 COMPLETE CBC W/AUTO DIFF WBC: CPT

## 2024-02-05 RX ORDER — LIDOCAINE HYDROCHLORIDE 10 MG/ML
INJECTION, SOLUTION EPIDURAL; INFILTRATION; INTRACAUDAL; PERINEURAL PRN
Status: COMPLETED | OUTPATIENT
Start: 2024-02-05 | End: 2024-02-05

## 2024-02-05 RX ORDER — MIDAZOLAM HYDROCHLORIDE 2 MG/2ML
INJECTION, SOLUTION INTRAMUSCULAR; INTRAVENOUS PRN
Status: COMPLETED | OUTPATIENT
Start: 2024-02-05 | End: 2024-02-05

## 2024-02-05 RX ORDER — FENTANYL CITRATE 50 UG/ML
INJECTION, SOLUTION INTRAMUSCULAR; INTRAVENOUS PRN
Status: COMPLETED | OUTPATIENT
Start: 2024-02-05 | End: 2024-02-05

## 2024-02-05 RX ADMIN — MIDAZOLAM HYDROCHLORIDE 1 MG: 1 INJECTION, SOLUTION INTRAMUSCULAR; INTRAVENOUS at 10:56

## 2024-02-05 RX ADMIN — LIDOCAINE HYDROCHLORIDE 5 ML: 10 INJECTION, SOLUTION EPIDURAL; INFILTRATION; INTRACAUDAL; PERINEURAL at 10:55

## 2024-02-05 RX ADMIN — FENTANYL CITRATE 50 MCG: 50 INJECTION INTRAMUSCULAR; INTRAVENOUS at 10:56

## 2024-02-05 RX ADMIN — MIDAZOLAM HYDROCHLORIDE 1 MG: 1 INJECTION, SOLUTION INTRAMUSCULAR; INTRAVENOUS at 10:51

## 2024-02-05 RX ADMIN — FENTANYL CITRATE 50 MCG: 50 INJECTION INTRAMUSCULAR; INTRAVENOUS at 10:51

## 2024-02-05 NOTE — PROGRESS NOTES
Cath holding summary:    0930: Patient ambulated from waiting area without difficulty, placed on monitor. A&O x 4, no c/o pain. NPO since midnight, ID and allergies verified. H&P reviewed, med rec completed. PIV x 1  inserted, blood sent to lab, consent ready for signature.    1045: Verbal report given to Natividadnitza jose carlos Trejo being transferred to IR for ordered procedure. Report consisted of patient's Situation, Background, Assessment and Recommendations (SBAR). Information from the following report(s) Adult Overview, Surgery Report, and MAR was reviewed with the receiving nurse. Opportunity for questions and clarification was provided.    1115: Verbal report received from Connie on Charles Trejo being received from IR fromordered procedure. Report consisted of patient's Situation, Background, Assessment and Recommendations (SBAR). Information from the following report(s) Nurse Handoff Report, Adult Overview, and Surgery Report was reviewed with the receiving nurse. Pt A&O x 4, no c/o pain. Opportunity for questions and clarification provided.  Procedure: IR Procedure  Intervention: No  Site: Back    1240: AVS Discharge instructions reviewed with patient, all questions answered. Patient verbalized understanding, copy given. Procedural site within normal limits, PIV removed. No hematoma or bleeding noted from procedural or IV site. A&Ox4 no c/o pain, discharged in stable condition. Escorted out to vehicle for transport home.

## 2024-02-05 NOTE — PROCEDURES
RADIOLOGY POST PROCEDURE NOTE     February 5, 2024       11:20 AM     Preoperative Diagnosis:   Multiple myeloma.    Postoperative Diagnosis:  Same.    :  Dr. Milton    Assistant:  None.    Type of Anesthesia: 1% plain lidocaine and IV moderate sedation with Versed and fentanyl.    Procedure/Description:  Image guided bone marrow Bx.    Findings:   No bleeding.    Estimated blood Loss:  Minimal    Specimen Removed:  Yes    Blood transfusions:  None.    Implants:  None.    Complications: None    Condition: Stable    Discharge Plan:  discharge home  if stable and no bleeding. Resume blood thinners if any in 24 hours    Vincenzo Milton MD

## 2024-02-05 NOTE — H&P
History and Physical    Patient: Charles Trejo           Sex: male       DOA: 2/5/2024  YOB: 1950      Age:  73 y.o.     LOS:  LOS: 0 days        HPI:     Charles Trejo is a 73 y.o. male with history of multiple myeloma here for a bone marrow biopsy and aspiration with moderate sedation.    Past Medical History:   Diagnosis Date    Abnormal nuclear cardiac imaging test 01/08/2016    Low risk.  No ischemia or prior infarction.  No RWMA.  EF 63%.  Neg EKG on pharm stress test.    Arthritis     BACK, KNEES    Echocardiogram abnormal 01/08/2016    EF 60%.  No WMA.  Mod LVH.  Gr 2 DDfx.  Mild TIEN.      H/O transesophageal echocardiography (WAYLON) for monitoring 11/08/2016    EF 55%.  No WMA.  LVH.  Mild LAE.  No LA appendage thrombus.  Mod TIEN.      Holter monitor, abnormal 02/12/2016    Mildly abnormal 48-hr Holter.  Normal sinus rhythm w/a few short bursts of atrial fibrillation from 20-40 beats up to 170 bpm.  No symptoms reported.    Hypercholesterolemia     Hypertension     YOAN treated with BiPAP     Typical atrial flutter (HCC) 12/19/2015    atrial flutter with 2 to1 block at a rate of 135 on EKG       Past Surgical History:   Procedure Laterality Date    CATARACT REMOVAL Bilateral     COLOSTOMY CLOSURE N/A 2019    took 10 inches of colon out because of a blockage     HEMORRHOID SURGERY      IR KYPHOPLASTY LUMBAR FIRST LEVEL  7/27/2023    IR KYPHOPLASTY LUMBAR FIRST LEVEL 7/27/2023 Vincenzo Milton MD [facility]    IR KYPHOPLASTY LUMBAR FIRST LEVEL  7/31/2023    IR KYPHOPLASTY LUMBAR FIRST LEVEL 7/31/2023 Vincenzo Milton MD [facility]    IR KYPHOPLASTY LUMBAR FIRST LEVEL  9/14/2023    IR KYPHOPLASTY LUMBAR FIRST LEVEL 9/14/2023 MD [facility] IR    IR KYPHOPLASTY THORACIC FIRST LEVEL  9/14/2023    IR KYPHOPLASTY THORACIC FIRST LEVEL 9/14/2023 Vincenzo Milton MD [facility]    ID UNLISTED PROCEDURE ABDOMEN PERITONEUM & OMENTUM      COLECOMY WITH

## 2024-06-06 ENCOUNTER — HOSPITAL ENCOUNTER (OUTPATIENT)
Facility: HOSPITAL | Age: 74
Discharge: HOME | End: 2024-06-06
Attending: INTERNAL MEDICINE
Payer: MEDICARE

## 2024-06-06 DIAGNOSIS — C79.51 SECONDARY MALIGNANT NEOPLASM OF BONE AND BONE MARROW (HCC): ICD-10-CM

## 2024-06-06 DIAGNOSIS — C90.00 MYELOMA ASSOCIATED AMYLOIDOSIS (HCC): ICD-10-CM

## 2024-06-06 DIAGNOSIS — E85.9 MYELOMA ASSOCIATED AMYLOIDOSIS (HCC): ICD-10-CM

## 2024-06-06 DIAGNOSIS — C79.52 SECONDARY MALIGNANT NEOPLASM OF BONE AND BONE MARROW (HCC): ICD-10-CM

## 2024-06-06 DIAGNOSIS — C90.00 MULTIPLE MYELOMA, REMISSION STATUS UNSPECIFIED (HCC): ICD-10-CM

## 2024-06-06 DIAGNOSIS — G62.0 POLYNEUROPATHY DUE TO DRUG (HCC): ICD-10-CM

## 2024-06-06 PROCEDURE — 78816 PET IMAGE W/CT FULL BODY: CPT

## 2024-06-06 PROCEDURE — 3430000000 HC RX DIAGNOSTIC RADIOPHARMACEUTICAL: Performed by: INTERNAL MEDICINE

## 2024-06-06 PROCEDURE — A9609 HC RX DIAGNOSTIC RADIOPHARMACEUTICAL: HCPCS | Performed by: INTERNAL MEDICINE

## 2024-06-06 RX ORDER — FLUDEOXYGLUCOSE F-18 500 MCI/ML
13.37 INJECTION INTRAVENOUS
Status: COMPLETED | OUTPATIENT
Start: 2024-06-06 | End: 2024-06-06

## 2024-06-06 RX ADMIN — FLUDEOXYGLUCOSE F-18 13.37 MILLICURIE: 500 INJECTION INTRAVENOUS at 15:28

## 2024-07-23 ENCOUNTER — HOSPITAL ENCOUNTER (OUTPATIENT)
Facility: HOSPITAL | Age: 74
Setting detail: RECURRING SERIES
Discharge: HOME | End: 2024-07-26
Payer: MEDICARE

## 2024-07-23 PROCEDURE — 97110 THERAPEUTIC EXERCISES: CPT

## 2024-07-23 PROCEDURE — 97535 SELF CARE MNGMENT TRAINING: CPT

## 2024-07-23 PROCEDURE — 97161 PT EVAL LOW COMPLEX 20 MIN: CPT

## 2024-07-23 NOTE — PROGRESS NOTES
[facility]  [address] [place] [address] Phone: [phone] Fax [phone]  Plan of Care / Statement of Necessity for Physical Therapy Services     Patient Name: Charles Trejo : 1950   Treatment   Diagnosis: M54.59, G89.29  CHRONIC LOWER BACK PAIN Medical Diagnosis: Other low back pain [M54.59]  Collapsed vertebra, not elsewhere classified, lumbar region, initial encounter for fracture [M48.56XA]   Onset Date: 24 - referral date Payor Source: Payor: MEDICARE / Plan: MEDICARE PART A AND B / Product Type: *No Product type* /    Referral Source: Napoleon Tubbs MD Start of Care (SOC): 2024   Prior Hospitalization: See medical history Provider #: [id number]   Prior Level of Function: Previously functionally independent with ADLs, participated in recreational walking, enjoyed cooking and wood working, Lives with wife in 2 story home, with bedroom/bathroom upstairs, Retired    Comorbidities: Respiratory disorders, Musculoskeletal disorders, Neurologic condition, and Other: arthritis, high cholesterol, HTN, myeloma - in remission, YOAN with CPAP, peripheral neuropathy, A flutter, bilateral cataract removal, vertebral fxs with kyphoplasty T12 to L4, hx cardiac ablation     Assessment / key information:  Pt is a 73 y.o. male who presents with c/o progressive worsening of low back pain. Symptoms occurred with insidious onset following multiple hospitalization for severe back pain and subsequent kyphoplasty levels T12 to L4 stemming from myeloma diagnosis. Functional deficits include: activity pacing, limited standing tolerance of about 10 minutes, difficulty with stairs, fatigue, lifting restrictions of about 8 pounds per oncologist, reduced ability to perform moderate and heavy household work. Upon exam, pt exhibited reduced transfer performance, limited standing tolerance, impaired balance, and impaired gait even

## 2024-07-23 NOTE — PROGRESS NOTES
PT DAILY TREATMENT NOTE/LUMBAR EVAL     Patient Name: Charles Trejo    Date: 2024    : 1950  Insurance: Payor: MEDICARE / Plan: MEDICARE PART A AND B / Product Type: *No Product type* /      Patient  verified yes     Visit #   Current / Total 1 24   Time   In / Out 9:45 10:30   Pain   In / Out 1 0 - sore   Subjective Functional Status/Changes: See Subjective Summary       Treatment Area: Other low back pain [M54.59]  Collapsed vertebra, not elsewhere classified, lumbar region, initial encounter for fracture [M48.56XA]  SUBJECTIVE    Subjective functional status/changes:     Chief Complaint: low back pain  History/Mechanism of Injury: Progressive worsening chronic low back pain including vertebral fractures  Current Symptoms/Deficits: activity pacing, limited standing tolerance of about 10 minutes, difficulty with stairs, fatigue, lifting restrictions of about 8 pounds per oncologist, reduced ability to perform moderate and heavy household work  Pain-  Current: 1/10     Worst: 10/10   Best: 0/10  Previous Treatment/Compliance: prior round of aquatic PT  Mobility Devices: FWW available  PMHx/Surgical Hx: arthritis, high cholesterol, HTN, myeloma - in remission, YOAN with CPAP, peripheral neuropathy, A flutter, bilateral cataract removal, vertebral fxs with kyphoplasty T12 to L4, hx cardiac ablation    Diagnostic Tests: [] Lab work [] X-rays    [] CT [] MRI     [] Other:  Results:    Work Hx: Retired  Living Situation: Lives with wife in 2 story home, with bedroom/bathroom upstairs  Household Modifications: none reported, has over-toilet commode available  Hobbies: enjoys cooking, recreational walking, wood working, yard work  PLOF: Previously functionally independent with ADLs,  Pt Goals: \"build up weak muscles\" & \"to improve walking tolerance\"    General Health:  Red Flags Indicated? [] Yes    [x] No  [] Yes [x] No Recent weight change (If yes, due to dieting? [] Yes  [] No)   [] Yes [x]

## 2024-08-05 ENCOUNTER — HOSPITAL ENCOUNTER (OUTPATIENT)
Facility: HOSPITAL | Age: 74
Setting detail: RECURRING SERIES
Discharge: HOME | End: 2024-08-08
Payer: MEDICARE

## 2024-08-05 PROCEDURE — 97112 NEUROMUSCULAR REEDUCATION: CPT

## 2024-08-05 PROCEDURE — 97110 THERAPEUTIC EXERCISES: CPT

## 2024-08-05 NOTE — PROGRESS NOTES
PHYSICAL / OCCUPATIONAL THERAPY - DAILY TREATMENT NOTE    Patient Name: Charles Trejo    Date: 2024    : 1950  Insurance: Payor: MEDICARE / Plan: MEDICARE PART A AND B / Product Type: *No Product type* /      Patient  verified Yes     Visit #   Current / Total 2 24   Time   In / Out 9:45 10:27   Pain   In / Out 1-2 0 sore   Subjective Functional Status/Changes: I feel like I'm starting from the beginning since the  evaluation.  I really haven't been able to do my ex's much due to traveling.  And I really aggravated my back after leaning over to do puzzles.     TREATMENT AREA =  Other low back pain [M54.59]  Collapsed vertebra, not elsewhere classified, lumbar region, initial encounter for fracture [M48.56XA]     OBJECTIVE    Therapeutic Procedures:    Tx Min Billable or 1:1 Min (if diff from Tx Min) Procedure, Rationale, Specifics   32 32 11762 Neuromuscular Re-Education (timed):  improve balance, coordination, kinesthetic sense, posture, core stability and proprioception to improve patient's ability to develop conscious control of individual muscles and awareness of position of extremities in order to progress to PLOF and address remaining functional goals. (see flow sheet as applicable)     Details if applicable:       10 10 13007 Therapeutic Exercise (timed):  increase ROM, strength, coordination, balance, and proprioception to improve patient's ability to progress to PLOF and address remaining functional goals. (see flow sheet as applicable)     Details if applicable:            Details if applicable:            Details if applicable:            Details if applicable:     42 42 [facility] Totals Reminder: bill using total billable min of TIMED therapeutic procedures (example: do not include dry needle or estim unattended, both untimed codes, in totals to left)  8-22 min = 1 unit; 23-37 min = 2 units; 38-52 min = 3 units; 53-67 min = 4 units; 68-82 min = 5 units   Total Total     [x]  Patient Education

## 2024-08-08 ENCOUNTER — APPOINTMENT (OUTPATIENT)
Facility: HOSPITAL | Age: 74
End: 2024-08-08
Payer: MEDICARE

## 2024-08-09 ENCOUNTER — HOSPITAL ENCOUNTER (OUTPATIENT)
Facility: HOSPITAL | Age: 74
Setting detail: RECURRING SERIES
Discharge: HOME | End: 2024-08-12
Payer: MEDICARE

## 2024-08-09 PROCEDURE — 97112 NEUROMUSCULAR REEDUCATION: CPT

## 2024-08-09 PROCEDURE — 97530 THERAPEUTIC ACTIVITIES: CPT

## 2024-08-09 PROCEDURE — 97110 THERAPEUTIC EXERCISES: CPT

## 2024-08-09 NOTE — PROGRESS NOTES
PHYSICAL / OCCUPATIONAL THERAPY - DAILY TREATMENT NOTE    Patient Name: Charles Trejo    Date: 2024    : 1950  Insurance: Payor: MEDICARE / Plan: MEDICARE PART A AND B / Product Type: *No Product type* /      Patient  verified Yes     Visit #   Current / Total 3 24   Time   In / Out 8:20 9:16   Pain   In / Out 0/10 0/10- sore.    Subjective Functional Status/Changes: I'm not really in pain at the moment just pretty sore and stiff.      TREATMENT AREA =  Other low back pain [M54.59]  Collapsed vertebra, not elsewhere classified, lumbar region, initial encounter for fracture [M48.56XA]     OBJECTIVE         Therapeutic Procedures:    Tx Min Billable or 1:1 Min (if diff from Tx Min) Procedure, Rationale, Specifics   16 16 64674 Therapeutic Exercise (timed):  increase ROM, strength, coordination, balance, and proprioception to improve patient's ability to progress to PLOF and address remaining functional goals. (see flow sheet as applicable)     Details if applicable:       30 30 08673 Neuromuscular Re-Education (timed):  improve balance, coordination, kinesthetic sense, posture, core stability and proprioception to improve patient's ability to develop conscious control of individual muscles and awareness of position of extremities in order to progress to PLOF and address remaining functional goals. (see flow sheet as applicable)     Details if applicable:     10 10 84298 Therapeutic Activity (timed):  use of dynamic activities replicating functional movements to increase ROM, strength, coordination, balance, and proprioception in order to improve patient's ability to progress to PLOF and address remaining functional goals.  (see flow sheet as applicable)     Details if applicable:  to include goal assessment.              56 56 [facility] Totals Reminder: bill using total billable min of TIMED therapeutic procedures (example: do not include dry needle or estim unattended, both untimed codes, in totals to

## 2024-08-12 ENCOUNTER — HOSPITAL ENCOUNTER (OUTPATIENT)
Facility: HOSPITAL | Age: 74
Setting detail: RECURRING SERIES
End: 2024-08-12
Payer: MEDICARE

## 2024-08-15 ENCOUNTER — HOSPITAL ENCOUNTER (OUTPATIENT)
Facility: HOSPITAL | Age: 74
Setting detail: RECURRING SERIES
Discharge: HOME | End: 2024-08-18
Payer: MEDICARE

## 2024-08-15 PROCEDURE — 97112 NEUROMUSCULAR REEDUCATION: CPT

## 2024-08-15 NOTE — PROGRESS NOTES
PHYSICAL / OCCUPATIONAL THERAPY - DAILY TREATMENT NOTE    Patient Name: Charles Trejo    Date: 8/15/2024    : 1950  Insurance: Payor: MEDICARE / Plan: MEDICARE PART A AND B / Product Type: *No Product type* /      Patient  verified Yes     Visit #   Current / Total 4 24   Time   In / Out 9:52 10:25   Pain   In / Out 0 \"stiff\" 0   Subjective Functional Status/Changes: Pt reports he started chemo yesterday for 2 weeks. He is more stiff and sore today and did a lot of activity yesterday as well.      TREATMENT AREA =  Other low back pain [M54.59]  Collapsed vertebra, not elsewhere classified, lumbar region, initial encounter for fracture [M48.56XA]     OBJECTIVE  Therapeutic Procedures:    Tx Min Billable or 1:1 Min (if diff from Tx Min) Procedure, Rationale, Specifics   33  35583 Neuromuscular Re-Education (timed):  improve balance, coordination, kinesthetic sense, posture, core stability and proprioception to improve patient's ability to develop conscious control of individual muscles and awareness of position of extremities in order to progress to PLOF and address remaining functional goals. (see flow sheet as applicable)     Details if applicable:     33  [facility] Totals Reminder: bill using total billable min of TIMED therapeutic procedures (example: do not include dry needle or estim unattended, both untimed codes, in totals to left)  8-22 min = 1 unit; 23-37 min = 2 units; 38-52 min = 3 units; 53-67 min = 4 units; 68-82 min = 5 units   Total Total     [x]  Patient Education billed concurrently with other procedures   [x] Review HEP    [] Progressed/Changed HEP, detail:    [] Other detail:       Objective Information/Functional Measures/Assessment  Reported no pain post session today, only tired/fatigued. Challenged with single limb balance on the right LE with trap bar marches and hip EXT/ABD. Fatigues with lateral bandwalks and with standing birddog. Denied increased pain with exercises today. Continue

## 2024-08-19 ENCOUNTER — APPOINTMENT (OUTPATIENT)
Facility: HOSPITAL | Age: 74
End: 2024-08-19
Payer: MEDICARE

## 2024-08-22 ENCOUNTER — HOSPITAL ENCOUNTER (OUTPATIENT)
Facility: HOSPITAL | Age: 74
Setting detail: RECURRING SERIES
Discharge: HOME | End: 2024-08-25
Payer: MEDICARE

## 2024-08-22 PROCEDURE — 97530 THERAPEUTIC ACTIVITIES: CPT

## 2024-08-22 PROCEDURE — 97112 NEUROMUSCULAR REEDUCATION: CPT

## 2024-08-22 NOTE — PROGRESS NOTES
PHYSICAL / OCCUPATIONAL THERAPY - DAILY TREATMENT NOTE    Patient Name: Charles Trejo    Date: 2024    : 1950  Insurance: Payor: MEDICARE / Plan: MEDICARE PART A AND B / Product Type: *No Product type* /      Patient  verified Yes     Visit #   Current / Total 1 20   Time   In / Out 9:40 10:20   Pain   In / Out 0 0   Subjective Functional Status/Changes: Since starting the chemo last week, I've been wiped out     TREATMENT AREA =  Other low back pain [M54.59]  Collapsed vertebra, not elsewhere classified, lumbar region, initial encounter for fracture [M48.56XA]     OBJECTIVE        Therapeutic Procedures:    Tx Min Billable or 1:1 Min (if diff from Tx Min) Procedure, Rationale, Specifics        17 17 92246 Therapeutic Activity (timed):  use of dynamic activities replicating functional movements to increase ROM, strength, coordination, balance, and proprioception in order to improve patient's ability to progress to PLOF and address remaining functional goals.  (see flow sheet as applicable)     Details if applicable:      58885 Neuromuscular Re-Education (timed):  improve balance, coordination, kinesthetic sense, posture, core stability and proprioception to improve patient's ability to develop conscious control of individual muscles and awareness of position of extremities in order to progress to PLOF and address remaining functional goals. (see flow sheet as applicable)     Details if applicable:            Details if applicable:            Details if applicable:     40 40 [facility] Totals Reminder: bill using total billable min of TIMED therapeutic procedures (example: do not include dry needle or estim unattended, both untimed codes, in totals to left)  8-22 min = 1 unit; 23-37 min = 2 units; 38-52 min = 3 units; 53-67 min = 4 units; 68-82 min = 5 units   Total Total     [x]  Patient Education billed concurrently with other procedures   [x] Review HEP    [] Progressed/Changed HEP, detail:    []  Other detail:       Objective Information/Functional Measures/Assessment    Pt has attended skilled physical therapy for 5 visits to address Other low back pain [M54.59]  Collapsed vertebra, not elsewhere classified, lumbar region, initial encounter for fracture [M48.56XA] and has made slow progress thus far with therapy measures.  However,  patient began Chemo last week, August 13,2024 which has impacted activity tolerance due to significant fatigue with daily tasks and ex's.   Objectively, Pt demonstrates improved sit to stands form bed, without use of UE's.  Pt's functional gains include slight improvement in back pian,reduced stiffness prior to beginning chemo treatments.  Pt's functional deficits include difficulty with bending, leaning over counter to cook, wash dishes.    Pt reports a self-reported improvement rating of 10-15% since start of care.     Patient will continue to benefit from skilled PT / OT services to modify and progress therapeutic interventions, analyze and address functional mobility deficits, analyze and address ROM deficits, analyze and address strength deficits, analyze and address soft tissue restrictions, analyze and cue for proper movement patterns, analyze and modify for postural abnormalities, analyze and address imbalance/dizziness, and instruct in home and community integration to address functional deficits and attain remaining goals.    Progress toward goals / Updated goals:  []  See Progress Note/Recertification    Short Term Goals: To be accomplished in 4 treatments:   1- Goal: Pt to be compliant with initial HEP to improve performance with ADLs.  Status at last note/certification: Established and reviewed with pt   partial compliance, however pt was out of town.  (8/5/2024)  Long Term Goals: To be accomplished in 24 treatments:  1- Goal: Pt to improve standing tolerance to 30 minutes or greater without increased pain to improve performance of cooking tasks.  Status at last

## 2024-08-22 NOTE — PROGRESS NOTES
[facility]   [address] [place] [address]  Phone: [phone] Fax: [phone]  PROGRESS NOTE  Patient Name: Charles Trejo : 1950   Treatment/Medical Diagnosis: Other low back pain [M54.59]  Collapsed vertebra, not elsewhere classified, lumbar region, initial encounter for fracture [M48.56XA]   Referral Source: Napoleon Tubbs MD     Payor: Payor: MEDICARE / Plan: MEDICARE PART A AND B / Product Type: *No Product type* /            Date of Initial Visit: 2024 Attended Visits: 5 Missed Visits: 1       CURRENT GOAL STATUS  1- Goal: Pt to be compliant with initial HEP to improve performance with ADLs.  Status at last note/certification: Established and reviewed with pt  Current: Progressing - partial compliance, however pt was out of town.  (2024)  Long Term Goals: To be accomplished in 24 treatments:  1- Goal: Pt to improve standing tolerance to 30 minutes or greater without increased pain to improve performance of cooking tasks.  Status at last note/certification: 10 minutes  Current: progressing: 10-15 minutes standing tolerance when cooking. (2024)  2- Goal: Pt to improve bilateral hip ext/abd strength to 4+/5 or better to improve walking tolerance.  Status at last note/certification: left hip abduction: 4-/5 Extension:4-/5,  right hip abduction: 4/5, Extension: 4-/5. (2024)  Progressing: Abduction left 4+/5, right 4/5; extension left/right 4-/5 with back pain (2024)  3- Goal: Pt to improve performance of 30 second sit to stand to > 13 reps without UE support  Status at last note/certification: 9 reps without UE support, good eccentric control  Progressin x without UE support  Fatigue reported  (2024)  4- Goal: Pt to report improved stair negotiation to reciprocal pattern with 1 UE support or less to improve accessibility to the home environment.  Status at last note/certification: step to  improve performance with ADLs.  Status at last note/certification:  partial compliance, however pt was out of town.  (2024)  Current:  2- Goal: Pt to improve standing tolerance to 30 minutes or greater without increased pain to improve performance of cooking tasks.  Status at last note/certification: progressing: 10-15 minutes standing tolerance when cooking. (2024)  Current:   3- Goal: Pt to improve bilateral hip ext/abd strength to 4+/5 or better to improve walking tolerance.  Status at last note/certification: Progressing: Abduction left 4+/5, right 4/5; extension left/right 4-/5 with back pain (2024)  Current:   4- Goal: Pt to improve performance of 30 second sit to stand to > 13 reps without UE support  Status at last note/certification: Progressin x without UE support  Fatigue reported  (2024)  Current:   5- Goal: Pt to report improved stair negotiation to reciprocal pattern with 1 UE support or less to improve accessibility to the home environment.  Status at last note/certification: Not met: due to right knee, negotiates with step to pattern, handrails  for support  (2024)   Current:   6- Goal: Pt to report < 2/10 pain at worst to increase ease with ADLs.  Status at last note/certification: reports no pain currently but has stiffness 8/15/2024  Current:   7- Goal: Pt to report JOE score of 25% or less to show improved function and quality of life.  Status at last note/certification: Not met: Oswestry 40%  pt reports stiffness, not pain  (2024)  Current:     RECOMMENDATIONS  Patient would benefit from the continuation of skilled rehab interventions, per initial Plan of Care or most recent Medicare Recert, for functional progress to achieving above stated clinically significant goals.    If you have any questions/comments please contact us directly.  Thank you for allowing us to assist in the care of your patient.    Marisol Neff, PTA       2024       10:04 AM

## 2024-08-26 ENCOUNTER — APPOINTMENT (OUTPATIENT)
Facility: HOSPITAL | Age: 74
End: 2024-08-26
Payer: MEDICARE

## 2024-08-29 ENCOUNTER — HOSPITAL ENCOUNTER (OUTPATIENT)
Facility: HOSPITAL | Age: 74
Setting detail: RECURRING SERIES
End: 2024-08-29
Payer: MEDICARE

## 2024-08-29 PROCEDURE — 97112 NEUROMUSCULAR REEDUCATION: CPT

## 2024-08-29 PROCEDURE — 97110 THERAPEUTIC EXERCISES: CPT

## 2024-08-29 NOTE — PROGRESS NOTES
PHYSICAL / OCCUPATIONAL THERAPY - DAILY TREATMENT NOTE    Patient Name: Charles Trejo    Date: 2024    : 1950  Insurance: Payor: MEDICARE / Plan: MEDICARE PART A AND B / Product Type: *No Product type* /      Patient  verified Yes     Visit #   Current / Total 2 20   Time   In / Out 9:43 10:20   Pain   In / Out 0 0   Subjective Functional Status/Changes: Feeling better today as I'm off chemo this week.     TREATMENT AREA =  Other low back pain [M54.59]  Collapsed vertebra, not elsewhere classified, lumbar region, initial encounter for fracture [M48.56XA]     OBJECTIVE      Therapeutic Procedures:    Tx Min Billable or 1:1 Min (if diff from Tx Min) Procedure, Rationale, Specifics   15 15 [id number] Therapeutic Activity (timed):  use of dynamic activities replicating functional movements to increase ROM, strength, coordination, balance, and proprioception in order to improve patient's ability to progress to PLOF and address remaining functional goals.  (see flow sheet as applicable)     Details if applicable:       112 Neuromuscular Re-Education (timed):  improve balance, coordination, kinesthetic sense, posture, core stability and proprioception to improve patient's ability to develop conscious control of individual muscles and awareness of position of extremities in order to progress to PLOF and address remaining functional goals. (see flow sheet as applicable)     Details if applicable:            Details if applicable:            Details if applicable:            Details if applicable:     37 37 [facility] Totals Reminder: bill using total billable min of TIMED therapeutic procedures (example: do not include dry needle or estim unattended, both untimed codes, in totals to left)  8-22 min = 1 unit; 23-37 min = 2 units; 38-52 min = 3 units; 53-67 min = 4 units; 68-82 min = 5 units   Total Total     [x]  Patient Education billed concurrently with other procedures   [x] Review HEP    []

## 2024-09-03 ENCOUNTER — HOSPITAL ENCOUNTER (OUTPATIENT)
Facility: HOSPITAL | Age: 74
Setting detail: RECURRING SERIES
Discharge: HOME | End: 2024-09-06
Payer: MEDICARE

## 2024-09-03 PROCEDURE — 97112 NEUROMUSCULAR REEDUCATION: CPT

## 2024-09-03 PROCEDURE — 97530 THERAPEUTIC ACTIVITIES: CPT

## 2024-09-03 NOTE — PROGRESS NOTES
PHYSICAL / OCCUPATIONAL THERAPY - DAILY TREATMENT NOTE    Patient Name: Charles Trejo    Date: 9/3/2024    : 1950  Insurance: Payor: MEDICARE / Plan: MEDICARE PART A AND B / Product Type: *No Product type* /      Patient  verified Yes     Visit #   Current / Total 3 24   Time   In / Out 8:20 9:05   Pain   In / Out 0 0   Subjective Functional Status/Changes: I resume  chemo tomorrow But I still have good stamina today and no pain     TREATMENT AREA =  Other low back pain [M54.59]  Collapsed vertebra, not elsewhere classified, lumbar region, initial encounter for fracture [M48.56XA]     OBJECTIVE      Therapeutic Procedures:    Tx Min Billable or 1:1 Min (if diff from Tx Min) Procedure, Rationale, Specifics   25 25 [id number] Neuromuscular Re-Education (timed):  improve balance, coordination, kinesthetic sense, posture, core stability and proprioception to improve patient's ability to develop conscious control of individual muscles and awareness of position of extremities in order to progress to PLOF and address remaining functional goals. (see flow sheet as applicable)     Details if applicable:        Therapeutic Activity (timed):  use of dynamic activities replicating functional movements to increase ROM, strength, coordination, balance, and proprioception in order to improve patient's ability to progress to PLOF and address remaining functional goals.  (see flow sheet as applicable)     Details if applicable:            Details if applicable:            Details if applicable:            Details if applicable:     45 45 [facility] Totals Reminder: bill using total billable min of TIMED therapeutic procedures (example: do not include dry needle or estim unattended, both untimed codes, in totals to left)  8-22 min = 1 unit; 23-37 min = 2 units; 38-52 min = 3 units; 53-67 min = 4 units; 68-82 min = 5 units   Total Total     [x]  Patient Education billed concurrently with other procedures   [x] Review HEP

## 2024-09-05 ENCOUNTER — HOSPITAL ENCOUNTER (OUTPATIENT)
Facility: HOSPITAL | Age: 74
Setting detail: RECURRING SERIES
Discharge: HOME | End: 2024-09-08
Payer: MEDICARE

## 2024-09-05 PROCEDURE — 97112 NEUROMUSCULAR REEDUCATION: CPT

## 2024-09-05 PROCEDURE — 97530 THERAPEUTIC ACTIVITIES: CPT

## 2024-09-10 ENCOUNTER — HOSPITAL ENCOUNTER (OUTPATIENT)
Facility: HOSPITAL | Age: 74
Setting detail: RECURRING SERIES
Discharge: HOME | End: 2024-09-13
Payer: MEDICARE

## 2024-09-10 PROCEDURE — 97530 THERAPEUTIC ACTIVITIES: CPT

## 2024-09-10 PROCEDURE — 97112 NEUROMUSCULAR REEDUCATION: CPT

## 2024-09-12 ENCOUNTER — HOSPITAL ENCOUNTER (OUTPATIENT)
Facility: HOSPITAL | Age: 74
Setting detail: RECURRING SERIES
Discharge: HOME | End: 2024-09-15
Payer: MEDICARE

## 2024-09-12 PROCEDURE — 97112 NEUROMUSCULAR REEDUCATION: CPT

## 2024-09-12 PROCEDURE — 97530 THERAPEUTIC ACTIVITIES: CPT

## 2024-09-17 ENCOUNTER — HOSPITAL ENCOUNTER (OUTPATIENT)
Facility: HOSPITAL | Age: 74
Setting detail: RECURRING SERIES
Discharge: HOME | End: 2024-09-20
Payer: MEDICARE

## 2024-09-17 PROCEDURE — 97530 THERAPEUTIC ACTIVITIES: CPT

## 2024-09-17 PROCEDURE — 97112 NEUROMUSCULAR REEDUCATION: CPT

## 2024-09-19 ENCOUNTER — HOSPITAL ENCOUNTER (OUTPATIENT)
Facility: HOSPITAL | Age: 74
Setting detail: RECURRING SERIES
Discharge: HOME | End: 2024-09-22
Payer: MEDICARE

## 2024-09-19 PROCEDURE — 97530 THERAPEUTIC ACTIVITIES: CPT

## 2024-09-19 PROCEDURE — 97112 NEUROMUSCULAR REEDUCATION: CPT

## 2024-09-24 ENCOUNTER — APPOINTMENT (OUTPATIENT)
Facility: HOSPITAL | Age: 74
End: 2024-09-24
Payer: MEDICARE

## 2024-09-26 ENCOUNTER — HOSPITAL ENCOUNTER (OUTPATIENT)
Facility: HOSPITAL | Age: 74
Setting detail: RECURRING SERIES
Discharge: HOME | End: 2024-09-29
Payer: MEDICARE

## 2024-09-26 PROCEDURE — 97530 THERAPEUTIC ACTIVITIES: CPT

## 2024-09-26 PROCEDURE — 97112 NEUROMUSCULAR REEDUCATION: CPT

## 2024-09-26 NOTE — PROGRESS NOTES
Pt to improve bilateral hip ext/abd strength to 4+/5 or better to improve walking tolerance.  Status at last note/certification: progressing: hip abduction: left 4+/5, right 4+/5;  hip extension bilateral 4-/5  (9/19/2024)  Current:   3- Goal: Pt to report improved stair negotiation to reciprocal pattern with 1 UE support or less to improve accessibility to the home environment.  Status at last note/certification: progressing: intermittent reciprocal pattern  Step to pattern when carrying  groceries (9/17/2024)  Current:   4- Goal: Pt to report < 2/10 pain at worst to increase ease with ADLs.  Status at last note/certification: Progressing - 5/10 worst reported pain in the last week, reduced quickly with rest (09/19/24)  Current:   5- Goal: Pt to report JOE score of 25% or less to show improved function and quality of life.  Status at last note/certification: Currently met: 22 percent. (9/19/2024)  Current:    Next PN/ RC due 10/18/2024  Auth due (visit number/ date) ANMOL    PLAN  - Continue Plan of Care    Gerald Holt, PT    9/26/2024    9:02 AM  If an interpreting service was utilized for treatment of this patient, the contents of this document represent the material reviewed with the patient via the .     Future Appointments   Date Time Provider Department Center   9/26/2024  9:40 AM [facility]   9/30/2024  9:40 AM [facility]   10/3/2024  9:40 AM Janiya Avina, PT [facility]   10/7/2024  9:40 AM [facility] FELDMAN [facility]   10/10/2024  9:40 AM Janiya Avina, PT [facility]   10/14/2024  9:40 AM Griselda March, PT [facility]   10/17/2024  9:40 AM Alan Costa, PTA [facility]   2/17/2025 10:00 AM Eyad Gordon MD [facility] Sched

## 2024-09-30 ENCOUNTER — HOSPITAL ENCOUNTER (OUTPATIENT)
Facility: HOSPITAL | Age: 74
Setting detail: RECURRING SERIES
Discharge: HOME | End: 2024-10-03
Payer: MEDICARE

## 2024-09-30 PROCEDURE — 97530 THERAPEUTIC ACTIVITIES: CPT

## 2024-09-30 PROCEDURE — 97110 THERAPEUTIC EXERCISES: CPT

## 2024-09-30 NOTE — PROGRESS NOTES
environment.  Status at last note/certification: progressing: intermittent reciprocal pattern  Step to pattern when carrying  groceries (9/17/2024)  Current:   4  - Goal: Pt to report < 2/10 pain at worst to increase ease with ADLs.  Status at last note/certification: Progressing - 5/10 worst reported pain in the last week, reduced quickly with rest (09/19/24)  Current: 5/10 however, describes it as more soreness than pain (9/30/2024)    5- Goal: Pt to report JOE score of 25% or less to show improved function and quality of life.  Status at last note/certification: Currently met: 22 percent. (9/19/2024)  Current:     Next PN/ RC due 10/18/2024  Auth due (visit number/ date) ANMOL  PLAN  - Continue Plan of Care  - Upgrade activities as tolerated    Shahnaz Falcon, PT    9/30/2024    9:41 AM  If an interpreting service was utilized for treatment of this patient, the contents of this document represent the material reviewed with the patient via the .     Future Appointments   Date Time Provider Department Center   10/3/2024  9:40 AM Janiya Avina, PT [facility]   10/7/2024  9:40 AM PHUONG FELDMAN [facility]   10/10/2024  9:40 AM Janiya Avina, PT [facility]   10/14/2024  9:40 AM Griselda March, PT [facility]   10/17/2024  9:40 AM Alan Costa, PTA [facility]   2/17/2025 10:00 AM Eyad Gordon MD [facility] Sched

## 2024-10-03 ENCOUNTER — APPOINTMENT (OUTPATIENT)
Facility: HOSPITAL | Age: 74
End: 2024-10-03
Payer: MEDICARE

## 2024-10-07 ENCOUNTER — HOSPITAL ENCOUNTER (OUTPATIENT)
Facility: HOSPITAL | Age: 74
Setting detail: RECURRING SERIES
Discharge: HOME | End: 2024-10-10
Payer: MEDICARE

## 2024-10-07 PROCEDURE — 97530 THERAPEUTIC ACTIVITIES: CPT

## 2024-10-07 PROCEDURE — 97112 NEUROMUSCULAR REEDUCATION: CPT

## 2024-10-07 NOTE — PROGRESS NOTES
Physical Therapy Discharge Instructions      [facility]  [address] [place] [address] [phone] fax      Patient: Charles Trejo  : 1950      Continue Home Exercise Program 2 times per day for 6 weeks, then decrease to 2-3 times per week      Continue with    [] Ice  as needed 0 times per day     [] Heat           Follow up with MD:     [] Upon completion of therapy     [x] As needed      Recommendations:     [x]   Return to activity with home program    []   Return to activity with the following modifications:       []Post Rehab Program    []Join Independent aquatic program     []Return to/join local gym        Additional Comments:       Marisol Neff, PTA 10/7/2024 10:25 AM    If an interpreting service was utilized for treatment of this patient, the contents of this document represent the material reviewed with the patient via the .  
Information/Functional Measures/Assessment:     Pt attended 15 visits consistently and made consistent progress with skilled physical therapy services.  At time of last visit, Pt reported the following:  Functional Gains - increased stamina and strength, improved bending FWD due to increased core stability for preparing meals, household tasks and wood working.  Functional Deficits - No significant deficits, but does have some issues with stamina that he attributes to chemo therapy, but can recover more quickly since beginning PT; and 75% improvement since start of care.  Pt has met or is progressing towards all goals and is compliant with comprehensive HEP.  Pt is appropriate for D/C at this time to continue to manage care independently and maintain long term gains made with skilled therapy.      Thank you for this referral!      PLAN  [x]Discontinue therapy: [x]Patient has reached or is progressing toward set goals      []Patient is non-compliant or has abdicated      []Due to lack of appreciable progress towards set goals      Marisol Neff PTA    10/7/2024    9:46 AM    Future Appointments   Date Time Provider Department Center   10/14/2024  9:40 AM [facility]   10/17/2024  9:40 AM [facility] Costa[facility]   2/17/2025 10:00 AM Eyad Gordon MD [facility] Loreta Montalvo       If an interpreting service was utilized for treatment of this patient, the contents of this document represent the material reviewed with the patient via the .

## 2024-10-10 ENCOUNTER — APPOINTMENT (OUTPATIENT)
Facility: HOSPITAL | Age: 74
End: 2024-10-10
Payer: MEDICARE

## 2024-10-14 ENCOUNTER — APPOINTMENT (OUTPATIENT)
Facility: HOSPITAL | Age: 74
End: 2024-10-14
Payer: MEDICARE

## 2024-10-17 ENCOUNTER — APPOINTMENT (OUTPATIENT)
Facility: HOSPITAL | Age: 74
End: 2024-10-17
Payer: MEDICARE

## 2025-01-15 RX ORDER — METOPROLOL SUCCINATE 50 MG/1
50 TABLET, EXTENDED RELEASE ORAL DAILY
Qty: 90 TABLET | Refills: 3 | Status: SHIPPED | OUTPATIENT
Start: 2025-01-15

## 2025-01-15 RX ORDER — ROSUVASTATIN CALCIUM 20 MG/1
20 TABLET, COATED ORAL DAILY
Qty: 90 TABLET | Refills: 3 | Status: SHIPPED | OUTPATIENT
Start: 2025-01-15

## 2025-01-15 NOTE — PROGRESS NOTES
Cath holding summary:    1115: Patient ambulated from waiting area without difficulty, placed on monitor. A&Ox4, no c/o. ID, NPO status, allergies, home meds verified, and H&P reviewed. PIV x1 inserted. Consent ready for signature. Provider notified of patient arrival.     1150: Verbal report given to RT Rylee on Mikaela Curl  being transferred to IR Angio for ordered procedure. Report consisted of patient's Situation, Background, Assessment and Recommendations(SBAR). Information from the following report(s) Index, Adult Overview, Intake/Output, MAR, Recent Results, Pre Procedure Checklist, Procedure Verification, Quality Measures, and Neuro Assessment was reviewed with the receiving nurse.
Detail Level: Zone

## 2025-03-11 ENCOUNTER — HOSPITAL ENCOUNTER (OUTPATIENT)
Facility: HOSPITAL | Age: 75
Discharge: HOME | End: 2025-03-14
Payer: MEDICARE

## 2025-03-11 DIAGNOSIS — N40.0 BPH WITHOUT URINARY OBSTRUCTION: ICD-10-CM

## 2025-03-11 DIAGNOSIS — N28.1 CYST OF KIDNEY, ACQUIRED: ICD-10-CM

## 2025-03-11 PROCEDURE — 76770 US EXAM ABDO BACK WALL COMP: CPT

## 2025-03-13 ENCOUNTER — OFFICE VISIT (OUTPATIENT)
Age: 75
End: 2025-03-13
Payer: MEDICARE

## 2025-03-13 VITALS
HEIGHT: 68 IN | SYSTOLIC BLOOD PRESSURE: 118 MMHG | DIASTOLIC BLOOD PRESSURE: 82 MMHG | BODY MASS INDEX: 32.13 KG/M2 | OXYGEN SATURATION: 96 % | HEART RATE: 68 BPM | WEIGHT: 212 LBS

## 2025-03-13 DIAGNOSIS — I48.0 PAROXYSMAL ATRIAL FIBRILLATION (HCC): Primary | ICD-10-CM

## 2025-03-13 DIAGNOSIS — C90.00 MULTIPLE MYELOMA, REMISSION STATUS UNSPECIFIED (HCC): ICD-10-CM

## 2025-03-13 DIAGNOSIS — Z01.818 PRE-OP TESTING: ICD-10-CM

## 2025-03-13 PROCEDURE — 1123F ACP DISCUSS/DSCN MKR DOCD: CPT | Performed by: INTERNAL MEDICINE

## 2025-03-13 PROCEDURE — 1126F AMNT PAIN NOTED NONE PRSNT: CPT | Performed by: INTERNAL MEDICINE

## 2025-03-13 PROCEDURE — G8417 CALC BMI ABV UP PARAM F/U: HCPCS | Performed by: INTERNAL MEDICINE

## 2025-03-13 PROCEDURE — 3079F DIAST BP 80-89 MM HG: CPT | Performed by: INTERNAL MEDICINE

## 2025-03-13 PROCEDURE — 1036F TOBACCO NON-USER: CPT | Performed by: INTERNAL MEDICINE

## 2025-03-13 PROCEDURE — 3074F SYST BP LT 130 MM HG: CPT | Performed by: INTERNAL MEDICINE

## 2025-03-13 PROCEDURE — 3017F COLORECTAL CA SCREEN DOC REV: CPT | Performed by: INTERNAL MEDICINE

## 2025-03-13 PROCEDURE — G8427 DOCREV CUR MEDS BY ELIG CLIN: HCPCS | Performed by: INTERNAL MEDICINE

## 2025-03-13 PROCEDURE — 93000 ELECTROCARDIOGRAM COMPLETE: CPT | Performed by: INTERNAL MEDICINE

## 2025-03-13 PROCEDURE — 99214 OFFICE O/P EST MOD 30 MIN: CPT | Performed by: INTERNAL MEDICINE

## 2025-03-13 RX ORDER — LENALIDOMIDE 15 MG/1
15 CAPSULE ORAL DAILY
COMMUNITY
Start: 2023-10-11

## 2025-03-13 RX ORDER — ACYCLOVIR 400 MG/1
1 TABLET ORAL 2 TIMES DAILY
COMMUNITY

## 2025-03-13 ASSESSMENT — PATIENT HEALTH QUESTIONNAIRE - PHQ9
SUM OF ALL RESPONSES TO PHQ QUESTIONS 1-9: 0
SUM OF ALL RESPONSES TO PHQ QUESTIONS 1-9: 0
2. FEELING DOWN, DEPRESSED OR HOPELESS: NOT AT ALL
1. LITTLE INTEREST OR PLEASURE IN DOING THINGS: NOT AT ALL
SUM OF ALL RESPONSES TO PHQ QUESTIONS 1-9: 0
SUM OF ALL RESPONSES TO PHQ QUESTIONS 1-9: 0

## 2025-03-13 NOTE — PROGRESS NOTES
Charles Trejo presents today for   Chief Complaint   Patient presents with    Follow-up       Charles Trejo preferred language for health care discussion is english/other.    Is someone accompanying this pt? no    Is the patient using any DME equipment during OV? no    Depression Screening:  Depression: Not at risk (9/14/2023)    PHQ-2     PHQ-2 Score: 0        Learning Assessment:  Who is the primary learner? Patient    What is the preferred language for health care of the primary learner? ENGLISH    How does the primary learner prefer to learn new concepts? DEMONSTRATION    Answered By patient    Relationship to Learner SELF           Pt currently taking Anticoagulant therapy? eliquis    Pt currently taking Antiplatelet therapy ? no      Coordination of Care:  1. Have you been to the ER, urgent care clinic since your last visit? Hospitalized since your last visit? no    2. Have you seen or consulted any other health care providers outside of the [facility] since your last visit? Include any pap smears or colon screening. no

## 2025-03-14 ENCOUNTER — RESULTS FOLLOW-UP (OUTPATIENT)
Facility: HOSPITAL | Age: 75
End: 2025-03-14

## 2025-03-24 NOTE — PROGRESS NOTES
Charles is in the office today for cardiovascular reevaluation of his paroxysmal atrial fibrillation.  He has a history of hypertension and hypercholesterolemia.    In December 2015  he  was found to be in atrial flutter with 2:1 block with a heart rate of 135-140. He was given IV Lopressor and ultimately converted  to sinus rhythm.       He continued to have recurrent problems with paroxysms of atrial fibrillation and ultimately had pulmonary vein isolation and ablation using [employer] in November 2016.   He was initially on amiodarone for a few months which was later discontinued.      At a prior visit, he reported that he had been in a lot of pain.  He has had a compression fracture of his lumbar vertebrae and had a  kyphoplasty.   He is following with Dr. Cowan for his bone disease.  He reports that occasionally he feels his heart beating \"alejandra fast \".  In the office today, he reports he has been diagnosed with multiple myeloma He has had no recent palpitations.  He is occasionally lightheaded when he first gets up.       At a prior visit, an echo and Holter were ordered.  The Holter monitor was completed on 8/29/2023.  Rhythm was sinus.  Average heart rate 99 bpm.  PACs.  Very short runs of SVT.  Minimal PVCs.  The echocardiogram was done on 9/13/2023.  EF was 65 to 70%.  There was moderately increased wall thickness.  There was no significant valvular pathology.    In the office today he reports no recent chest pain or shortness of breath.            Plan; return in 6 months.       PCP: Napoleon Tubbs MD       Past Medical History:   Diagnosis Date    Cardiac echocardiogram 01/08/2016    EF 60%.  No WMA.  Mod LVH.  Gr 2 DDfx.  Mild TIEN.      Cardiac Holter monitor 02/12/2016    Mildly abnormal 48-hr Holter.  Normal sinus rhythm w/a few short bursts of atrial fibrillation from 20-40 beats up to 170 bpm.  No symptoms reported.    Cardiac nuclear imaging study, normal 01/08/2016    Low